# Patient Record
Sex: MALE | Race: WHITE | NOT HISPANIC OR LATINO | Employment: OTHER | URBAN - METROPOLITAN AREA
[De-identification: names, ages, dates, MRNs, and addresses within clinical notes are randomized per-mention and may not be internally consistent; named-entity substitution may affect disease eponyms.]

---

## 2018-02-02 ENCOUNTER — OFFICE VISIT CONVERTED (OUTPATIENT)
Dept: CARDIOLOGY | Facility: CLINIC | Age: 63
End: 2018-02-02
Attending: INTERNAL MEDICINE

## 2018-03-05 ENCOUNTER — OFFICE VISIT CONVERTED (OUTPATIENT)
Dept: UROLOGY | Facility: CLINIC | Age: 63
End: 2018-03-05
Attending: UROLOGY

## 2018-03-05 ENCOUNTER — CONVERSION ENCOUNTER (OUTPATIENT)
Dept: SURGERY | Facility: CLINIC | Age: 63
End: 2018-03-05

## 2018-04-03 ENCOUNTER — OFFICE VISIT CONVERTED (OUTPATIENT)
Dept: ORTHOPEDIC SURGERY | Facility: CLINIC | Age: 63
End: 2018-04-03
Attending: PHYSICIAN ASSISTANT

## 2018-04-23 ENCOUNTER — OFFICE VISIT CONVERTED (OUTPATIENT)
Dept: UROLOGY | Facility: CLINIC | Age: 63
End: 2018-04-23
Attending: UROLOGY

## 2018-04-23 ENCOUNTER — CONVERSION ENCOUNTER (OUTPATIENT)
Dept: SURGERY | Facility: CLINIC | Age: 63
End: 2018-04-23

## 2018-05-01 ENCOUNTER — OFFICE VISIT CONVERTED (OUTPATIENT)
Dept: ORTHOPEDIC SURGERY | Facility: CLINIC | Age: 63
End: 2018-05-01
Attending: PHYSICIAN ASSISTANT

## 2018-06-06 ENCOUNTER — CONVERSION ENCOUNTER (OUTPATIENT)
Dept: SURGERY | Facility: CLINIC | Age: 63
End: 2018-06-06

## 2018-06-06 ENCOUNTER — OFFICE VISIT CONVERTED (OUTPATIENT)
Dept: UROLOGY | Facility: CLINIC | Age: 63
End: 2018-06-06
Attending: UROLOGY

## 2018-06-12 ENCOUNTER — OFFICE VISIT CONVERTED (OUTPATIENT)
Dept: ORTHOPEDIC SURGERY | Facility: CLINIC | Age: 63
End: 2018-06-12
Attending: ORTHOPAEDIC SURGERY

## 2018-07-09 ENCOUNTER — OFFICE VISIT CONVERTED (OUTPATIENT)
Dept: UROLOGY | Facility: CLINIC | Age: 63
End: 2018-07-09
Attending: UROLOGY

## 2018-07-09 ENCOUNTER — CONVERSION ENCOUNTER (OUTPATIENT)
Dept: SURGERY | Facility: CLINIC | Age: 63
End: 2018-07-09

## 2018-09-06 ENCOUNTER — OFFICE VISIT CONVERTED (OUTPATIENT)
Dept: ORTHOPEDIC SURGERY | Facility: CLINIC | Age: 63
End: 2018-09-06
Attending: ORTHOPAEDIC SURGERY

## 2018-11-06 ENCOUNTER — OFFICE VISIT CONVERTED (OUTPATIENT)
Dept: SURGERY | Facility: CLINIC | Age: 63
End: 2018-11-06
Attending: PHYSICIAN ASSISTANT

## 2018-11-06 ENCOUNTER — CONVERSION ENCOUNTER (OUTPATIENT)
Dept: SURGERY | Facility: CLINIC | Age: 63
End: 2018-11-06

## 2018-11-07 ENCOUNTER — OFFICE VISIT CONVERTED (OUTPATIENT)
Dept: CARDIOLOGY | Facility: CLINIC | Age: 63
End: 2018-11-07
Attending: INTERNAL MEDICINE

## 2019-01-23 ENCOUNTER — HOSPITAL ENCOUNTER (OUTPATIENT)
Dept: FAMILY MEDICINE CLINIC | Facility: CLINIC | Age: 64
Discharge: HOME | End: 2019-01-23

## 2019-01-23 ENCOUNTER — OFFICE VISIT CONVERTED (OUTPATIENT)
Dept: FAMILY MEDICINE CLINIC | Facility: CLINIC | Age: 64
End: 2019-01-23
Attending: NURSE PRACTITIONER

## 2019-01-23 LAB
ALBUMIN SERPL-MCNC: 4.2 G/DL (ref 3.5–5)
ALBUMIN/GLOB SERPL: 1.4 {RATIO} (ref 1.4–2.6)
ALP SERPL-CCNC: 59 U/L (ref 56–155)
ALT SERPL-CCNC: 22 U/L (ref 10–40)
ANION GAP SERPL CALC-SCNC: 20 MMOL/L (ref 8–19)
APPEARANCE UR: CLEAR
AST SERPL-CCNC: 25 U/L (ref 15–50)
BILIRUB SERPL-MCNC: 0.93 MG/DL (ref 0.2–1.3)
BILIRUB UR QL: NEGATIVE
BUN SERPL-MCNC: 25 MG/DL (ref 5–25)
BUN/CREAT SERPL: 28 {RATIO} (ref 6–20)
CALCIUM SERPL-MCNC: 9.3 MG/DL (ref 8.7–10.4)
CHLORIDE SERPL-SCNC: 102 MMOL/L (ref 99–111)
COLOR UR: YELLOW
CONV CO2: 22 MMOL/L (ref 22–32)
CONV COLLECTION SOURCE (UA): NORMAL
CONV TOTAL PROTEIN: 7.1 G/DL (ref 6.3–8.2)
CONV UROBILINOGEN IN URINE BY AUTOMATED TEST STRIP: 0.2 {EHRLICHU}/DL (ref 0.1–1)
CREAT UR-MCNC: 0.9 MG/DL (ref 0.7–1.2)
GFR SERPLBLD BASED ON 1.73 SQ M-ARVRAT: >60 ML/MIN/{1.73_M2}
GLOBULIN UR ELPH-MCNC: 2.9 G/DL (ref 2–3.5)
GLUCOSE SERPL-MCNC: 90 MG/DL (ref 70–99)
GLUCOSE UR QL: NEGATIVE MG/DL
HGB UR QL STRIP: NEGATIVE
KETONES UR QL STRIP: NEGATIVE MG/DL
LEUKOCYTE ESTERASE UR QL STRIP: NEGATIVE
NITRITE UR QL STRIP: NEGATIVE
OSMOLALITY SERPL CALC.SUM OF ELEC: 294 MOSM/KG (ref 273–304)
PH UR STRIP.AUTO: 5.5 [PH] (ref 5–8)
POTASSIUM SERPL-SCNC: 4.3 MMOL/L (ref 3.5–5.3)
PROT UR QL: NEGATIVE MG/DL
PSA SERPL-MCNC: 5.76 NG/ML (ref 0–4)
SODIUM SERPL-SCNC: 140 MMOL/L (ref 135–147)
SP GR UR: 1.03 (ref 1–1.03)

## 2019-02-22 ENCOUNTER — OFFICE VISIT CONVERTED (OUTPATIENT)
Dept: FAMILY MEDICINE CLINIC | Facility: CLINIC | Age: 64
End: 2019-02-22
Attending: NURSE PRACTITIONER

## 2019-02-22 ENCOUNTER — HOSPITAL ENCOUNTER (OUTPATIENT)
Dept: FAMILY MEDICINE CLINIC | Facility: CLINIC | Age: 64
Discharge: HOME | End: 2019-02-22

## 2019-02-22 LAB
ANION GAP SERPL CALC-SCNC: 22 MMOL/L (ref 8–19)
BUN SERPL-MCNC: 24 MG/DL (ref 5–25)
BUN/CREAT SERPL: 27 {RATIO} (ref 6–20)
CALCIUM SERPL-MCNC: 9.8 MG/DL (ref 8.7–10.4)
CHLORIDE SERPL-SCNC: 103 MMOL/L (ref 99–111)
CONV CO2: 22 MMOL/L (ref 22–32)
CREAT UR-MCNC: 0.9 MG/DL (ref 0.7–1.2)
GFR SERPLBLD BASED ON 1.73 SQ M-ARVRAT: >60 ML/MIN/{1.73_M2}
GLUCOSE SERPL-MCNC: 107 MG/DL (ref 70–99)
OSMOLALITY SERPL CALC.SUM OF ELEC: 301 MOSM/KG (ref 273–304)
POTASSIUM SERPL-SCNC: 4.3 MMOL/L (ref 3.5–5.3)
PSA SERPL-MCNC: 9.1 NG/ML (ref 0–4)
SODIUM SERPL-SCNC: 143 MMOL/L (ref 135–147)

## 2019-02-28 ENCOUNTER — OFFICE VISIT CONVERTED (OUTPATIENT)
Dept: UROLOGY | Facility: CLINIC | Age: 64
End: 2019-02-28
Attending: UROLOGY

## 2019-03-19 ENCOUNTER — OFFICE VISIT CONVERTED (OUTPATIENT)
Dept: ORTHOPEDIC SURGERY | Facility: CLINIC | Age: 64
End: 2019-03-19
Attending: ORTHOPAEDIC SURGERY

## 2019-03-20 ENCOUNTER — HOSPITAL ENCOUNTER (OUTPATIENT)
Dept: FAMILY MEDICINE CLINIC | Facility: CLINIC | Age: 64
Discharge: HOME | End: 2019-03-20

## 2019-03-20 LAB
ANION GAP SERPL CALC-SCNC: 16 MMOL/L (ref 8–19)
BUN SERPL-MCNC: 24 MG/DL (ref 5–25)
BUN/CREAT SERPL: 22 {RATIO} (ref 6–20)
CALCIUM SERPL-MCNC: 9.2 MG/DL (ref 8.7–10.4)
CHLORIDE SERPL-SCNC: 102 MMOL/L (ref 99–111)
CHOLEST SERPL-MCNC: 136 MG/DL (ref 107–200)
CHOLEST/HDLC SERPL: 3.3 {RATIO} (ref 3–6)
CONV CO2: 23 MMOL/L (ref 22–32)
CREAT UR-MCNC: 1.1 MG/DL (ref 0.7–1.2)
GFR SERPLBLD BASED ON 1.73 SQ M-ARVRAT: >60 ML/MIN/{1.73_M2}
GLUCOSE SERPL-MCNC: 104 MG/DL (ref 70–99)
HDLC SERPL-MCNC: 41 MG/DL (ref 40–60)
LDLC SERPL CALC-MCNC: 79 MG/DL (ref 70–100)
OSMOLALITY SERPL CALC.SUM OF ELEC: 288 MOSM/KG (ref 273–304)
POTASSIUM SERPL-SCNC: 4.2 MMOL/L (ref 3.5–5.3)
SODIUM SERPL-SCNC: 137 MMOL/L (ref 135–147)
TRIGL SERPL-MCNC: 82 MG/DL (ref 40–150)
VLDLC SERPL-MCNC: 16 MG/DL (ref 5–37)

## 2019-04-01 ENCOUNTER — HOSPITAL ENCOUNTER (OUTPATIENT)
Dept: LAB | Facility: HOSPITAL | Age: 64
Discharge: HOME | End: 2019-04-01
Attending: UROLOGY

## 2019-04-01 LAB — PSA SERPL-MCNC: 6.68 NG/ML (ref 0–4)

## 2019-04-11 ENCOUNTER — OFFICE VISIT CONVERTED (OUTPATIENT)
Dept: UROLOGY | Facility: CLINIC | Age: 64
End: 2019-04-11
Attending: UROLOGY

## 2019-05-16 ENCOUNTER — OFFICE VISIT CONVERTED (OUTPATIENT)
Dept: CARDIOLOGY | Facility: CLINIC | Age: 64
End: 2019-05-16
Attending: INTERNAL MEDICINE

## 2019-05-24 ENCOUNTER — HOSPITAL ENCOUNTER (OUTPATIENT)
Dept: OTHER | Facility: HOSPITAL | Age: 64
Discharge: HOME | End: 2019-05-24
Attending: INTERNAL MEDICINE

## 2019-05-24 LAB
ANION GAP SERPL CALC-SCNC: 19 MMOL/L (ref 8–19)
BUN SERPL-MCNC: 19 MG/DL (ref 5–25)
BUN/CREAT SERPL: 21 {RATIO} (ref 6–20)
CALCIUM SERPL-MCNC: 9.3 MG/DL (ref 8.7–10.4)
CHLORIDE SERPL-SCNC: 103 MMOL/L (ref 99–111)
CONV CO2: 24 MMOL/L (ref 22–32)
CREAT UR-MCNC: 0.91 MG/DL (ref 0.7–1.2)
GFR SERPLBLD BASED ON 1.73 SQ M-ARVRAT: >60 ML/MIN/{1.73_M2}
GLUCOSE SERPL-MCNC: 104 MG/DL (ref 70–99)
OSMOLALITY SERPL CALC.SUM OF ELEC: 297 MOSM/KG (ref 273–304)
POTASSIUM SERPL-SCNC: 4.3 MMOL/L (ref 3.5–5.3)
SODIUM SERPL-SCNC: 142 MMOL/L (ref 135–147)

## 2019-05-31 ENCOUNTER — CONVERSION ENCOUNTER (OUTPATIENT)
Dept: CARDIOLOGY | Facility: CLINIC | Age: 64
End: 2019-05-31
Attending: INTERNAL MEDICINE

## 2019-09-19 ENCOUNTER — HOSPITAL ENCOUNTER (OUTPATIENT)
Dept: FAMILY MEDICINE CLINIC | Facility: CLINIC | Age: 64
Discharge: HOME | End: 2019-09-19
Attending: NURSE PRACTITIONER

## 2019-09-19 ENCOUNTER — OFFICE VISIT CONVERTED (OUTPATIENT)
Dept: FAMILY MEDICINE CLINIC | Facility: CLINIC | Age: 64
End: 2019-09-19
Attending: NURSE PRACTITIONER

## 2019-09-19 ENCOUNTER — CONVERSION ENCOUNTER (OUTPATIENT)
Dept: FAMILY MEDICINE CLINIC | Facility: CLINIC | Age: 64
End: 2019-09-19

## 2019-09-19 LAB
25(OH)D3 SERPL-MCNC: 50.1 NG/ML (ref 30–100)
ALBUMIN SERPL-MCNC: 4.6 G/DL (ref 3.5–5)
ALBUMIN/GLOB SERPL: 1.5 {RATIO} (ref 1.4–2.6)
ALP SERPL-CCNC: 74 U/L (ref 56–155)
ALT SERPL-CCNC: 21 U/L (ref 10–40)
ANION GAP SERPL CALC-SCNC: 18 MMOL/L (ref 8–19)
AST SERPL-CCNC: 24 U/L (ref 15–50)
BASOPHILS # BLD AUTO: 0.05 10*3/UL (ref 0–0.2)
BASOPHILS NFR BLD AUTO: 0.7 % (ref 0–3)
BILIRUB SERPL-MCNC: 0.84 MG/DL (ref 0.2–1.3)
BUN SERPL-MCNC: 23 MG/DL (ref 5–25)
BUN/CREAT SERPL: 24 {RATIO} (ref 6–20)
CALCIUM SERPL-MCNC: 9.7 MG/DL (ref 8.7–10.4)
CHLORIDE SERPL-SCNC: 97 MMOL/L (ref 99–111)
CHOLEST SERPL-MCNC: 162 MG/DL (ref 107–200)
CHOLEST/HDLC SERPL: 3.7 {RATIO} (ref 3–6)
CONV ABS IMM GRAN: 0.02 10*3/UL (ref 0–0.2)
CONV CO2: 25 MMOL/L (ref 22–32)
CONV IMMATURE GRAN: 0.3 % (ref 0–1.8)
CONV TOTAL PROTEIN: 7.7 G/DL (ref 6.3–8.2)
CREAT UR-MCNC: 0.94 MG/DL (ref 0.7–1.2)
DEPRECATED RDW RBC AUTO: 44.1 FL (ref 35.1–43.9)
EOSINOPHIL # BLD AUTO: 0.12 10*3/UL (ref 0–0.7)
EOSINOPHIL # BLD AUTO: 1.6 % (ref 0–7)
ERYTHROCYTE [DISTWIDTH] IN BLOOD BY AUTOMATED COUNT: 12.8 % (ref 11.6–14.4)
GFR SERPLBLD BASED ON 1.73 SQ M-ARVRAT: >60 ML/MIN/{1.73_M2}
GLOBULIN UR ELPH-MCNC: 3.1 G/DL (ref 2–3.5)
GLUCOSE SERPL-MCNC: 125 MG/DL (ref 70–99)
HCT VFR BLD AUTO: 43.2 % (ref 42–52)
HDLC SERPL-MCNC: 44 MG/DL (ref 40–60)
HGB BLD-MCNC: 15 G/DL (ref 14–18)
LDLC SERPL CALC-MCNC: 95 MG/DL (ref 70–100)
LYMPHOCYTES # BLD AUTO: 1.55 10*3/UL (ref 1–5)
LYMPHOCYTES NFR BLD AUTO: 20.4 % (ref 20–45)
MCH RBC QN AUTO: 32.6 PG (ref 27–31)
MCHC RBC AUTO-ENTMCNC: 34.7 G/DL (ref 33–37)
MCV RBC AUTO: 93.9 FL (ref 80–96)
MONOCYTES # BLD AUTO: 0.61 10*3/UL (ref 0.2–1.2)
MONOCYTES NFR BLD AUTO: 8 % (ref 3–10)
NEUTROPHILS # BLD AUTO: 5.23 10*3/UL (ref 2–8)
NEUTROPHILS NFR BLD AUTO: 69 % (ref 30–85)
NRBC CBCN: 0 % (ref 0–0.7)
OSMOLALITY SERPL CALC.SUM OF ELEC: 287 MOSM/KG (ref 273–304)
PLATELET # BLD AUTO: 256 10*3/UL (ref 130–400)
PMV BLD AUTO: 10.8 FL (ref 9.4–12.4)
POTASSIUM SERPL-SCNC: 4.1 MMOL/L (ref 3.5–5.3)
RBC # BLD AUTO: 4.6 10*6/UL (ref 4.7–6.1)
SODIUM SERPL-SCNC: 136 MMOL/L (ref 135–147)
TRIGL SERPL-MCNC: 117 MG/DL (ref 40–150)
TSH SERPL-ACNC: 5.54 M[IU]/L (ref 0.27–4.2)
VLDLC SERPL-MCNC: 23 MG/DL (ref 5–37)
WBC # BLD AUTO: 7.58 10*3/UL (ref 4.8–10.8)

## 2019-10-11 ENCOUNTER — HOSPITAL ENCOUNTER (OUTPATIENT)
Dept: FAMILY MEDICINE CLINIC | Facility: CLINIC | Age: 64
Discharge: HOME | End: 2019-10-11
Attending: NURSE PRACTITIONER

## 2019-10-11 ENCOUNTER — CONVERSION ENCOUNTER (OUTPATIENT)
Dept: FAMILY MEDICINE CLINIC | Facility: CLINIC | Age: 64
End: 2019-10-11

## 2019-10-11 ENCOUNTER — OFFICE VISIT CONVERTED (OUTPATIENT)
Dept: FAMILY MEDICINE CLINIC | Facility: CLINIC | Age: 64
End: 2019-10-11
Attending: NURSE PRACTITIONER

## 2019-10-11 LAB
EST. AVERAGE GLUCOSE BLD GHB EST-MCNC: 123 MG/DL
HBA1C MFR BLD: 5.9 % (ref 3.5–5.7)
T4 FREE SERPL-MCNC: 1.4 NG/DL (ref 0.9–1.8)
TSH SERPL-ACNC: 4.88 M[IU]/L (ref 0.27–4.2)

## 2019-10-31 ENCOUNTER — HOSPITAL ENCOUNTER (OUTPATIENT)
Dept: LAB | Facility: HOSPITAL | Age: 64
Discharge: HOME | End: 2019-10-31
Attending: UROLOGY

## 2019-10-31 LAB — PSA SERPL-MCNC: 5.59 NG/ML (ref 0–4)

## 2019-11-06 ENCOUNTER — CONVERSION ENCOUNTER (OUTPATIENT)
Dept: SURGERY | Facility: CLINIC | Age: 64
End: 2019-11-06

## 2019-11-06 ENCOUNTER — OFFICE VISIT CONVERTED (OUTPATIENT)
Dept: CARDIOLOGY | Facility: CLINIC | Age: 64
End: 2019-11-06
Attending: INTERNAL MEDICINE

## 2019-11-06 ENCOUNTER — OFFICE VISIT CONVERTED (OUTPATIENT)
Dept: UROLOGY | Facility: CLINIC | Age: 64
End: 2019-11-06
Attending: UROLOGY

## 2019-11-18 ENCOUNTER — HOSPITAL ENCOUNTER (OUTPATIENT)
Dept: GENERAL RADIOLOGY | Facility: HOSPITAL | Age: 64
Discharge: HOME | End: 2019-11-18
Attending: PSYCHIATRY & NEUROLOGY

## 2019-12-31 ENCOUNTER — HOSPITAL ENCOUNTER (OUTPATIENT)
Dept: GENERAL RADIOLOGY | Facility: HOSPITAL | Age: 64
Discharge: HOME | End: 2019-12-31

## 2020-01-07 ENCOUNTER — OFFICE VISIT CONVERTED (OUTPATIENT)
Dept: FAMILY MEDICINE CLINIC | Facility: CLINIC | Age: 65
End: 2020-01-07
Attending: NURSE PRACTITIONER

## 2020-01-10 ENCOUNTER — HOSPITAL ENCOUNTER (OUTPATIENT)
Dept: GENERAL RADIOLOGY | Facility: HOSPITAL | Age: 65
Discharge: HOME | End: 2020-01-10
Attending: NURSE PRACTITIONER

## 2020-01-10 LAB
CREAT BLD-MCNC: 1.1 MG/DL (ref 0.6–1.4)
GFR SERPLBLD BASED ON 1.73 SQ M-ARVRAT: >60 ML/MIN/{1.73_M2}

## 2020-01-29 ENCOUNTER — HOSPITAL ENCOUNTER (OUTPATIENT)
Dept: GENERAL RADIOLOGY | Facility: HOSPITAL | Age: 65
Discharge: HOME | End: 2020-01-29

## 2020-01-31 ENCOUNTER — HOSPITAL ENCOUNTER (OUTPATIENT)
Dept: FAMILY MEDICINE CLINIC | Facility: CLINIC | Age: 65
Discharge: HOME | End: 2020-01-31
Attending: NURSE PRACTITIONER

## 2020-01-31 LAB
ALBUMIN SERPL-MCNC: 4.2 G/DL (ref 3.5–5)
ALBUMIN/GLOB SERPL: 1.4 {RATIO} (ref 1.4–2.6)
ALP SERPL-CCNC: 59 U/L (ref 56–155)
ALT SERPL-CCNC: 23 U/L (ref 10–40)
ANION GAP SERPL CALC-SCNC: 16 MMOL/L (ref 8–19)
AST SERPL-CCNC: 28 U/L (ref 15–50)
BASOPHILS # BLD AUTO: 0.05 10*3/UL (ref 0–0.2)
BASOPHILS NFR BLD AUTO: 0.6 % (ref 0–3)
BILIRUB SERPL-MCNC: 0.97 MG/DL (ref 0.2–1.3)
BUN SERPL-MCNC: 26 MG/DL (ref 5–25)
BUN/CREAT SERPL: 25 {RATIO} (ref 6–20)
CALCIUM SERPL-MCNC: 9.5 MG/DL (ref 8.7–10.4)
CHLORIDE SERPL-SCNC: 99 MMOL/L (ref 99–111)
CONV ABS IMM GRAN: 0.02 10*3/UL (ref 0–0.2)
CONV CO2: 26 MMOL/L (ref 22–32)
CONV IMMATURE GRAN: 0.3 % (ref 0–1.8)
CONV TOTAL PROTEIN: 7.2 G/DL (ref 6.3–8.2)
CREAT UR-MCNC: 1.03 MG/DL (ref 0.7–1.2)
DEPRECATED RDW RBC AUTO: 45.1 FL (ref 35.1–43.9)
EOSINOPHIL # BLD AUTO: 0.06 10*3/UL (ref 0–0.7)
EOSINOPHIL # BLD AUTO: 0.8 % (ref 0–7)
ERYTHROCYTE [DISTWIDTH] IN BLOOD BY AUTOMATED COUNT: 13 % (ref 11.6–14.4)
GFR SERPLBLD BASED ON 1.73 SQ M-ARVRAT: >60 ML/MIN/{1.73_M2}
GLOBULIN UR ELPH-MCNC: 3 G/DL (ref 2–3.5)
GLUCOSE SERPL-MCNC: 97 MG/DL (ref 70–99)
HCT VFR BLD AUTO: 44.5 % (ref 42–52)
HGB BLD-MCNC: 14.7 G/DL (ref 14–18)
LYMPHOCYTES # BLD AUTO: 1.61 10*3/UL (ref 1–5)
LYMPHOCYTES NFR BLD AUTO: 20.3 % (ref 20–45)
MCH RBC QN AUTO: 31.3 PG (ref 27–31)
MCHC RBC AUTO-ENTMCNC: 33 G/DL (ref 33–37)
MCV RBC AUTO: 94.9 FL (ref 80–96)
MONOCYTES # BLD AUTO: 0.6 10*3/UL (ref 0.2–1.2)
MONOCYTES NFR BLD AUTO: 7.6 % (ref 3–10)
NEUTROPHILS # BLD AUTO: 5.59 10*3/UL (ref 2–8)
NEUTROPHILS NFR BLD AUTO: 70.4 % (ref 30–85)
NRBC CBCN: 0 % (ref 0–0.7)
OSMOLALITY SERPL CALC.SUM OF ELEC: 289 MOSM/KG (ref 273–304)
PLATELET # BLD AUTO: 226 10*3/UL (ref 130–400)
PMV BLD AUTO: 10.8 FL (ref 9.4–12.4)
POTASSIUM SERPL-SCNC: 4 MMOL/L (ref 3.5–5.3)
RBC # BLD AUTO: 4.69 10*6/UL (ref 4.7–6.1)
SODIUM SERPL-SCNC: 137 MMOL/L (ref 135–147)
T4 FREE SERPL-MCNC: 1.3 NG/DL (ref 0.9–1.8)
TESTOST SERPL-MCNC: 188 NG/DL (ref 193–740)
TSH SERPL-ACNC: 4.15 M[IU]/L (ref 0.27–4.2)
WBC # BLD AUTO: 7.93 10*3/UL (ref 4.8–10.8)

## 2020-02-01 LAB — 25(OH)D3 SERPL-MCNC: 50.1 NG/ML (ref 30–100)

## 2020-02-07 ENCOUNTER — OFFICE VISIT CONVERTED (OUTPATIENT)
Dept: FAMILY MEDICINE CLINIC | Facility: CLINIC | Age: 65
End: 2020-02-07
Attending: NURSE PRACTITIONER

## 2020-02-07 ENCOUNTER — CONVERSION ENCOUNTER (OUTPATIENT)
Dept: FAMILY MEDICINE CLINIC | Facility: CLINIC | Age: 65
End: 2020-02-07

## 2020-02-08 ENCOUNTER — HOSPITAL ENCOUNTER (OUTPATIENT)
Dept: OTHER | Facility: HOSPITAL | Age: 65
Discharge: HOME | End: 2020-02-08
Attending: NURSE PRACTITIONER

## 2020-02-08 LAB
EST. AVERAGE GLUCOSE BLD GHB EST-MCNC: 131 MG/DL
HBA1C MFR BLD: 6.2 % (ref 3.5–5.7)

## 2020-02-15 LAB
BIOAVAILABLE TESTOSTERONE, %: 17.6 %
CONV TESTOSTERONE, FREE: 39 PG/ML
SHBG SERPL-SCNC: 64.5 NMOL/L
TESTOST FREE MFR SERPL: 1 %
TESTOST SERPL-MCNC: 392 NG/DL
TESTOSTERONE.FREE+WB SERPL-MCNC: 69 NG/DL

## 2020-03-06 ENCOUNTER — OFFICE VISIT CONVERTED (OUTPATIENT)
Dept: FAMILY MEDICINE CLINIC | Facility: CLINIC | Age: 65
End: 2020-03-06
Attending: NURSE PRACTITIONER

## 2020-04-16 ENCOUNTER — HOSPITAL ENCOUNTER (OUTPATIENT)
Dept: LAB | Facility: HOSPITAL | Age: 65
Discharge: HOME | End: 2020-04-16
Attending: UROLOGY

## 2020-04-16 LAB
APPEARANCE UR: CLEAR
BILIRUB UR QL: NEGATIVE
COLOR UR: YELLOW
CONV COLLECTION SOURCE (UA): NORMAL
CONV UROBILINOGEN IN URINE BY AUTOMATED TEST STRIP: 0.2 {EHRLICHU}/DL (ref 0.1–1)
GLUCOSE UR QL: NEGATIVE MG/DL
HGB UR QL STRIP: NEGATIVE
KETONES UR QL STRIP: NEGATIVE MG/DL
LEUKOCYTE ESTERASE UR QL STRIP: NEGATIVE
NITRITE UR QL STRIP: NEGATIVE
PH UR STRIP.AUTO: 7 [PH] (ref 5–8)
PROT UR QL: NEGATIVE MG/DL
SP GR UR: 1.01 (ref 1–1.03)

## 2020-04-18 LAB — BACTERIA UR CULT: NORMAL

## 2020-04-22 ENCOUNTER — TELEPHONE CONVERTED (OUTPATIENT)
Dept: UROLOGY | Facility: CLINIC | Age: 65
End: 2020-04-22
Attending: UROLOGY

## 2020-05-07 ENCOUNTER — OFFICE VISIT CONVERTED (OUTPATIENT)
Dept: FAMILY MEDICINE CLINIC | Facility: CLINIC | Age: 65
End: 2020-05-07
Attending: NURSE PRACTITIONER

## 2020-05-21 ENCOUNTER — TELEMEDICINE CONVERTED (OUTPATIENT)
Dept: CARDIOLOGY | Facility: CLINIC | Age: 65
End: 2020-05-21
Attending: INTERNAL MEDICINE

## 2020-05-26 ENCOUNTER — OFFICE VISIT CONVERTED (OUTPATIENT)
Dept: FAMILY MEDICINE CLINIC | Facility: CLINIC | Age: 65
End: 2020-05-26
Attending: NURSE PRACTITIONER

## 2020-06-02 ENCOUNTER — OFFICE VISIT CONVERTED (OUTPATIENT)
Dept: FAMILY MEDICINE CLINIC | Facility: CLINIC | Age: 65
End: 2020-06-02
Attending: NURSE PRACTITIONER

## 2020-06-18 ENCOUNTER — HOSPITAL ENCOUNTER (OUTPATIENT)
Dept: CARDIOLOGY | Facility: HOSPITAL | Age: 65
Discharge: HOME | End: 2020-06-18
Attending: NURSE PRACTITIONER

## 2020-06-19 ENCOUNTER — HOSPITAL ENCOUNTER (OUTPATIENT)
Dept: GENERAL RADIOLOGY | Facility: HOSPITAL | Age: 65
Discharge: HOME | End: 2020-06-19
Attending: NURSE PRACTITIONER

## 2020-06-23 ENCOUNTER — OFFICE VISIT CONVERTED (OUTPATIENT)
Dept: ORTHOPEDIC SURGERY | Facility: CLINIC | Age: 65
End: 2020-06-23
Attending: ORTHOPAEDIC SURGERY

## 2020-06-23 ENCOUNTER — CONVERSION ENCOUNTER (OUTPATIENT)
Dept: ORTHOPEDIC SURGERY | Facility: CLINIC | Age: 65
End: 2020-06-23

## 2020-07-07 ENCOUNTER — HOSPITAL ENCOUNTER (OUTPATIENT)
Dept: FAMILY MEDICINE CLINIC | Facility: CLINIC | Age: 65
Discharge: HOME | End: 2020-07-07
Attending: NURSE PRACTITIONER

## 2020-07-08 LAB — PSA SERPL-MCNC: 4.72 NG/ML (ref 0–4)

## 2020-08-04 ENCOUNTER — OFFICE VISIT CONVERTED (OUTPATIENT)
Dept: FAMILY MEDICINE CLINIC | Facility: CLINIC | Age: 65
End: 2020-08-04
Attending: NURSE PRACTITIONER

## 2020-08-18 ENCOUNTER — OFFICE VISIT CONVERTED (OUTPATIENT)
Dept: ORTHOPEDIC SURGERY | Facility: CLINIC | Age: 65
End: 2020-08-18
Attending: ORTHOPAEDIC SURGERY

## 2020-09-01 ENCOUNTER — OFFICE VISIT CONVERTED (OUTPATIENT)
Dept: FAMILY MEDICINE CLINIC | Facility: CLINIC | Age: 65
End: 2020-09-01
Attending: NURSE PRACTITIONER

## 2020-09-01 ENCOUNTER — CONVERSION ENCOUNTER (OUTPATIENT)
Dept: FAMILY MEDICINE CLINIC | Facility: CLINIC | Age: 65
End: 2020-09-01

## 2020-09-29 ENCOUNTER — HOSPITAL ENCOUNTER (OUTPATIENT)
Dept: ORTHOPEDIC SURGERY | Facility: CLINIC | Age: 65
Setting detail: RECURRING SERIES
Discharge: HOME | End: 2020-12-30
Attending: NURSE PRACTITIONER

## 2020-11-20 ENCOUNTER — OFFICE VISIT CONVERTED (OUTPATIENT)
Dept: UROLOGY | Facility: CLINIC | Age: 65
End: 2020-11-20
Attending: UROLOGY

## 2020-12-17 ENCOUNTER — HOSPITAL ENCOUNTER (OUTPATIENT)
Dept: FAMILY MEDICINE CLINIC | Facility: CLINIC | Age: 65
Discharge: HOME | End: 2020-12-17
Attending: NURSE PRACTITIONER

## 2020-12-17 LAB
EST. AVERAGE GLUCOSE BLD GHB EST-MCNC: 123 MG/DL
HBA1C MFR BLD: 5.9 % (ref 3.5–5.7)
PSA SERPL-MCNC: 8.22 NG/ML (ref 0–4)
TESTOST SERPL-MCNC: 477 NG/DL (ref 193–740)

## 2020-12-20 LAB — CONV ESTROGENS, TOTAL, SERUM: 167 PG/ML (ref 40–115)

## 2020-12-23 ENCOUNTER — OFFICE VISIT CONVERTED (OUTPATIENT)
Dept: FAMILY MEDICINE CLINIC | Facility: CLINIC | Age: 65
End: 2020-12-23
Attending: NURSE PRACTITIONER

## 2020-12-23 ENCOUNTER — HOSPITAL ENCOUNTER (OUTPATIENT)
Dept: FAMILY MEDICINE CLINIC | Facility: CLINIC | Age: 65
Discharge: HOME | End: 2020-12-23
Attending: NURSE PRACTITIONER

## 2020-12-23 LAB — PSA SERPL-MCNC: 6.34 NG/ML (ref 0–4)

## 2020-12-25 LAB — PROGEST SERPL-MCNC: <0.1 NG/ML (ref 0–0.5)

## 2021-01-06 ENCOUNTER — OFFICE VISIT CONVERTED (OUTPATIENT)
Dept: CARDIOLOGY | Facility: CLINIC | Age: 66
End: 2021-01-06
Attending: INTERNAL MEDICINE

## 2021-02-04 ENCOUNTER — OFFICE VISIT CONVERTED (OUTPATIENT)
Dept: ORTHOPEDIC SURGERY | Facility: CLINIC | Age: 66
End: 2021-02-04
Attending: ORTHOPAEDIC SURGERY

## 2021-02-22 ENCOUNTER — HOSPITAL ENCOUNTER (OUTPATIENT)
Dept: GENERAL RADIOLOGY | Facility: HOSPITAL | Age: 66
Discharge: HOME | End: 2021-02-22
Attending: THORACIC SURGERY (CARDIOTHORACIC VASCULAR SURGERY)

## 2021-03-05 ENCOUNTER — HOSPITAL ENCOUNTER (OUTPATIENT)
Dept: FAMILY MEDICINE CLINIC | Facility: CLINIC | Age: 66
Discharge: HOME | End: 2021-03-05
Attending: NURSE PRACTITIONER

## 2021-03-06 LAB — TESTOST SERPL-MCNC: 95 NG/DL (ref 193–740)

## 2021-03-09 ENCOUNTER — HOSPITAL ENCOUNTER (OUTPATIENT)
Dept: FAMILY MEDICINE CLINIC | Facility: CLINIC | Age: 66
Discharge: HOME | End: 2021-03-09
Attending: NURSE PRACTITIONER

## 2021-03-12 LAB — TESTOSTERONE, FREE: 1.7 PG/ML (ref 6.6–18.1)

## 2021-03-15 LAB — TESTOSTERONE, FREE: 10.4 PG/ML (ref 6.6–18.1)

## 2021-03-24 ENCOUNTER — OFFICE VISIT CONVERTED (OUTPATIENT)
Dept: FAMILY MEDICINE CLINIC | Facility: CLINIC | Age: 66
End: 2021-03-24
Attending: NURSE PRACTITIONER

## 2021-04-07 ENCOUNTER — HOSPITAL ENCOUNTER (OUTPATIENT)
Dept: LAB | Facility: HOSPITAL | Age: 66
Discharge: HOME | End: 2021-04-07
Attending: NURSE PRACTITIONER

## 2021-04-07 LAB
FSH SERPL-ACNC: 0.2 M[IU]/ML
LH SERPL-ACNC: 0.1 M[IU]/ML

## 2021-04-16 ENCOUNTER — OFFICE VISIT CONVERTED (OUTPATIENT)
Dept: FAMILY MEDICINE CLINIC | Facility: CLINIC | Age: 66
End: 2021-04-16
Attending: NURSE PRACTITIONER

## 2021-04-16 ENCOUNTER — HOSPITAL ENCOUNTER (OUTPATIENT)
Dept: OTHER | Facility: HOSPITAL | Age: 66
Discharge: HOME | End: 2021-04-16
Attending: INTERNAL MEDICINE

## 2021-04-16 LAB
BASOPHILS # BLD AUTO: 0.05 10*3/UL (ref 0–0.2)
BASOPHILS NFR BLD AUTO: 0.7 % (ref 0–3)
CONV ABS IMM GRAN: 0.03 10*3/UL (ref 0–0.2)
CONV IMMATURE GRAN: 0.4 % (ref 0–1.8)
DEPRECATED RDW RBC AUTO: 57.7 FL (ref 35.1–43.9)
EOSINOPHIL # BLD AUTO: 0.09 10*3/UL (ref 0–0.7)
EOSINOPHIL # BLD AUTO: 1.3 % (ref 0–7)
ERYTHROCYTE [DISTWIDTH] IN BLOOD BY AUTOMATED COUNT: 17 % (ref 11.6–14.4)
FSH SERPL-ACNC: 0.2 M[IU]/ML
HCT VFR BLD AUTO: 46 % (ref 42–52)
HGB BLD-MCNC: 14.3 G/DL (ref 14–18)
LH SERPL-ACNC: 0.1 M[IU]/ML
LYMPHOCYTES # BLD AUTO: 1.56 10*3/UL (ref 1–5)
LYMPHOCYTES NFR BLD AUTO: 23.2 % (ref 20–45)
MCH RBC QN AUTO: 29 PG (ref 27–31)
MCHC RBC AUTO-ENTMCNC: 31.1 G/DL (ref 33–37)
MCV RBC AUTO: 93.3 FL (ref 80–96)
MONOCYTES # BLD AUTO: 0.69 10*3/UL (ref 0.2–1.2)
MONOCYTES NFR BLD AUTO: 10.3 % (ref 3–10)
NEUTROPHILS # BLD AUTO: 4.3 10*3/UL (ref 2–8)
NEUTROPHILS NFR BLD AUTO: 64.1 % (ref 30–85)
NRBC CBCN: 0 % (ref 0–0.7)
PLATELET # BLD AUTO: 260 10*3/UL (ref 130–400)
PMV BLD AUTO: 10.5 FL (ref 9.4–12.4)
RBC # BLD AUTO: 4.93 10*6/UL (ref 4.7–6.1)
TESTOST SERPL-MCNC: 300 NG/DL (ref 193–740)
WBC # BLD AUTO: 6.72 10*3/UL (ref 4.8–10.8)

## 2021-04-19 LAB — TESTOSTERONE, FREE: 3.6 PG/ML (ref 6.6–18.1)

## 2021-04-23 LAB — CONV ESTROGENS, TOTAL, SERUM: 58 PG/ML (ref 40–115)

## 2021-05-10 NOTE — H&P
History and Physical      Patient Name: Poncho Hernandez   Patient ID: [id number]   Sex: Male   YOB: 1955    Primary Care Provider: Noemi CAMILO   Referring Provider: Ash CAMILO    Visit Date: June 23, 2020    Provider: Adi Renteria MD   Location: [facility]   Location Address: [address] [place] [address]  [id number]   Location Phone: [phone]          Chief Complaint  · Right Knee Pain  · History of Right Total Knee Arthroplasty      History Of Present Illness  Poncho Hernandez is a 64 year old /White male who presents today to [facility].      Patient presents today for right knee status-post arthroplasty performed 3/19/2018 by Dr. Renteria. He was doing well with his knee but has some weakness still. He reports the knee has been aching him more than usual and has some issues with ascending stairs. He has been in physical therapy for the knee with PTA with some improvements. Patient reports the pain has been lingering for about two months now, it has improved but is still bothersome.                 Past Medical History  Aftercare following bilateral knee joint replacement surgery; Anemia, Unspecified; Arthritis; Bladder Disorder; BPH; Depression with anxiety; Elevated prostate specific antigen (PSA); Forgetfulness; Heart Disease; Heart Murmur; Hernia; High blood pressure; Hypertension; Limb Swelling; Low testosterone in male; Night sweats; Prostate Disorder         Past Surgical History  Adenoidectomy; Arm Surgery; Artificial Joints/Limbs; Bilateral total knee arthroplasty; Colonoscopy; Cystoscopy with transurethral resection of prostate (TURP) using button electrode; Hand surgery; Hernia; Joint Surgery; Metal implants; Prostate Biopsy         Medication List  biotin oral; Calcium 500 + D 500 mg(1,250mg) -200 unit oral tablet; Co Q-10 200 mg oral capsule; Depo-Testosterone 200 mg/mL intramuscular oil; Fish Oil 1,000 mg (120 mg-180 mg) oral  "capsule; garlic 0 oral; lisinopril-hydrochlorothiazide 20-12.5 mg oral tablet; magnesium oxide 400 mg magnesium oral capsule; oxybutynin chloride 5 mg oral tablet; Toprol XL 50 mg oral tablet extended release 24 hr; tumeric oral; vitamin E 400 unit oral capsule         Allergy List  hydroquinone; Marcaine; Septocaine         Family Medical History  Stroke; Heart Disease; Cancer, Unspecified; Hypertension; Colon Cancer; Heart Attack (MI); Prostate cancer; Osteoporosis; Family history of Arthritis         Social History  Alcohol (Never); Alcohol Use (Never); Exercises regularly; lives with spouse; .; Physician; 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); Retired.; Tobacco (Never)         Review of Systems  · Constitutional  o Denies  o : fever, chills, weight loss  · Cardiovascular  o Denies  o : chest pain, shortness of breath  · Gastrointestinal  o Denies  o : liver disease, heartburn, nausea, blood in stools  · Genitourinary  o Denies  o : painful urination, blood in urine  · Integument  o Denies  o : rash, itching  · Neurologic  o Denies  o : headache, weakness, loss of consciousness  · Musculoskeletal  o Denies  o : painful, swollen joints  · Psychiatric  o Denies  o : drug/alcohol addiction, anxiety, depression      Vitals  Date Time BP Position Site L\R Cuff Size HR RR TEMP (F) WT  HT  BMI kg/m2 BSA m2 O2 Sat        06/23/2020 09:07 AM      57 - R   241lbs 0oz 6'  2\" 30.94 2.39 97 %          Physical Examination  · Constitutional  o Appearance  o : well developed, well-nourished, no obvious deformities present  · Head and Face  o Head  o :   § Inspection  § : normocephalic  o Face  o :   § Inspection  § : no facial lesions  · Eyes  o Conjunctivae  o : conjunctivae normal  o Sclerae  o : sclerae white  · Ears, Nose, Mouth and Throat  o Ears  o :   § External Ears  § : appearance within normal limits  § Hearing  § : intact  o Nose  o :   § External Nose  § : appearance normal  · Neck  o Inspection/Palpation  o : " normal appearance  o Range of Motion  o : full range of motion  · Respiratory  o Respiratory Effort  o : breathing unlabored  o Inspection of Chest  o : normal appearance  o Auscultation of Lungs  o : no audible wheezing or rales  · Cardiovascular  o Heart  o : regular rate  · Gastrointestinal  o Abdominal Examination  o : soft and non-tender  · Skin and Subcutaneous Tissue  o General Inspection  o : intact, no rashes  · Psychiatric  o General  o : Alert and oriented x3  o Judgement and Insight  o : judgment and insight intact  o Mood and Affect  o : mood normal, affect appropriate  · Right Knee  o Inspection  o : full extension, tender over the lateral knee, tender over anterior knee, sensation intact, full flexion, well-healed scar over the anterior knee  · Injection Note/Aspiration Note  o Site  o : IM  o Procedure  o : Procedure: After educating the patient, patient gave consent for the procedure. After using alcohol prep, injection was given. The patient tolerated the procedure well.   o Medication  o : 2ml's of 4 mg Dexamethasone   · In Office Procedures  o View  o : AP/LATERAL/SUNRISE   o Site  o : right, knee  o Indication  o : Right Total Knee Arthroplasty  o Study  o : X-rays ordered, taken in the office, and reviewed today.  o Xray  o : reveals an intact appearing right knee replacement, no complications   o Comparative Data  o : No comparative data found          Assessment  · Right knee pain, unspecified chronicity     719.46/M25.561  · History of total knee arthroplasty, right     V43.65/Z96.651      Plan  · Orders  o 2.00 - Dexamethasone Injection 8mg (-3) - - 06/23/2020   L[facility] [id number] Exp 01 2021 [facility]ered by MER Light MA  o IM/SQ - Injection Fee [facility] (33355) - - 06/23/2020  o Knee (Right) [facility] Preferred View (30641-WH) - V43.65/Z96.651, 719.46/M25.561 - 06/23/2020  · Medications  o Medications have been Reconciled  o Transition of Care or Provider  Policy  · Instructions  o Reviewed the patient's Past Medical, Social, and Family history as well as the ROS at today's visit, no changes.  o Call or return if worsening symptoms.  o X-ray ordered, taken and reviewed at this visit.  o The above service was scribed by Beverly Campa on my behalf and I attest to the accuracy of the note. mc  o Discussed with the patient he may benefit from continued therapy and limit his high-impact activities at this time. We discussed with the patient an intra-articular knee injection is not recommended post-replacement, recommended an IM injection. Patient expressed understanding and wished to proceed with physical therapy order and IM injection.             Electronically Signed by: Beverly Campa-, Other -Author on June 23, 2020 03:11:11 PM  Electronically Co-signed by: Adi Renteria MD -Reviewer on June 25, 2020 12:19:50 PM

## 2021-05-12 NOTE — PROGRESS NOTES
Quick Note      Patient Name: Poncho Hernandez   Patient ID: [id number]   Sex: Male   YOB: 1955    Primary Care Provider: Noemi CAMILO   Referring Provider: Ash CAMILO    Visit Date: April 22, 2020    Provider: Monica Phillips MD   [facility]   Location Address: [address] [place] [address]  [id number]   Location Phone: [phone]          History Of Present Illness  TELEHEALTH TELEPHONE VISIT  Chief Complaint: Elevated PSA, BPH with obstruction   Poncho Hernandez is a 64 year old /White male who is presenting for evaluation via telehealth telephone visit. Verbal consent obtained before beginning visit.   Provider spent 11 minutes with the patient during telehealth visit.   The following staff were present during this visit: Rose Marie and Monica Phillips MD   Past Medical History/Overview of Patient Symptoms     PSA recently in October 2019 was 5.59.  This is actually down from his most recent PSA prior which was 6.68.  His PSA has been as high as 9.  He had a negative prostate biopsy in 2011.      Since his button TURP in May 2018 and he is doing very well.  He is emptying much better.  Less frequency and urgency.  Nocturia a few times a night.  He states his quality of life is greatly improved.  It has gotten a little worse over the past few months and he is having more nocturia with urgency.  He states his stream is still really good despite the urgency and nocturia.  He has it some times in the day but he is better able to control it.     He was recently diagnosed with low T and has started on depo-testosterone about one month ago.      Past uro history   *************  Takes Urinary System Support  - a herbal supplement    He has had an up and down elevated PSA for years with a negative prostate biopsy in 2011.  At that time his PSA was 5.7.  It had been as high as 6.98 in 2014.  It was 3.84 in Dec 2018 and then 9.10 in Feb 2019 in a  repeat check.  His most recent PSA is 6.68 in April 2019 after Bactrim for 4 weeks.  He also had another round of Cipro for a UTI.      He has been having more burning lately.     He had a button TURP that was unremarkable.                Assessment  · Benign enlargement of prostate     600.00/N40.0  · Elevated PSA     790.93/R97.20  · Overactive bladder     596.51/N32.81      Plan  · Orders  o Physician Telephone Evaluation, 11-20 minutes (05377) - 790.93/R97.20, 600.00/N40.0, 596.51/N32.81 - 04/22/2020  · Medications  o oxybutynin chloride 5 mg oral tablet   SIG: take 1 tablet by oral route QHS   DISP: (90) tablets with 3 refills  Prescribed on 04/22/2020     o Medications have been Reconciled  o Transition of Care or Provider Policy  · Instructions  o I started him on oxybutynin 5mg at night to help with his overnight OAB symptoms. I will see him in 6 months.   o I will see him back in 6 months with PSA prior.  o Plan Of Care:   o Chronic conditions reviewed and taken into consideration for today's treatment plan.  o Patient instructed to seek medical attention urgently for new or worsening symptoms.  o Patient was educated/instructed on their diagnosis, treatment and medications prior to discharge from the clinic today.  o Discussed Covid-19 precautions including, but not limited to, social distancing, avoid touching your face, and hand washing.             Electronically Signed by: Monica Phillips MD -Author on April 22, 2020 02:18:14 PM

## 2021-05-13 NOTE — PROGRESS NOTES
Progress Note      Patient Name: Poncho Hernandez   Patient ID: [id number]   Sex: Male   YOB: 1955    Primary Care Provider: Noemi CAMILO   Referring Provider: Ash CAMILO    Visit Date: May 26, 2020    Provider: TON العلي   Location: [facility]   Location Address: [address] [place] [address], [address]  [id number]   Location Phone: [phone]          Chief Complaint  · Welcome to Medicare Visit      History Of Present Illness  The patient is a 64 year old /White male who has come to this office for his Annual Wellness Visit.   His Primary Care Provider is Ash CAMILO. His comprehensive Care Team list, including suppliers, has been updated on the Facesheet. His medical/family history, height, weight, BMI, and blood pressure have been reviewed and are in the chart. The Health Risk Assessment has been completed and scanned in the chart.   Medications are listed in the medication list.   The active problem list includes: Arthritis, BPH, Depression with anxiety, Elevated prostate specific antigen (PSA), Forgetfulness, Heart Disease, Heart Murmur, Hypertension, Low testosterone in male, and Prostate Disorder   The patient does not have a history of substance use.   Patient reports his diet is adequate.   The Mini-Cog has been administered and is scanned in chart. The results are negative. His cognitive function is without limitation.   A hearing loss screen was completed today and the result is negative.   Patient does not have any risk factors for depression. Patient completed the PHQ-9 today and it has been scanned in the chart. The total score is 1-4.   The Timed Up and Go screen was administered today and the result is negative.   The Tobar Index of [place] in ADLs indicated full function (score of 6).   A Falls Risk Assessment has been completed, including a review of home fall hazards and medication review.   Overall, the patient's functional  ability is noted by this provider to be within normal limits. His level of safety is noted to be within normal limits. His balance/gait is within normal limits. There has been a fall without injury in the past year. Patient-specific home safety recommendations have been reviewed and a copy has been given to patient.   He denies issues with leaking urine.   There are no additional risk factors identified.   Living Will/Advanced Directive has not previously been completed.   Personalized health advice was given to the patient and a written health screening schedule was established; see Plan for details.   The patient is a 64 year old /White male who has come to this office for his Welcome to Medicare Visit. His Primary Care Provider is Ash CAMILO. His comprehensive Care Team list, including suppliers, has been updated on the Facesheet. His medical/surgical/family history, height, weight, BMI, and blood pressure have been reviewed and are in the chart.   Medications are listed in the medication list.   The active problem list includes: PROBLEM LIST   The patient does not have a history of substance use.   Patient reports his diet is adequate.   Patient reports his physical activity is adequate.   A hearing loss screen was completed today and the result is negative.   Patient does not have any risk factors for depression. Patient completed the PHQ-9 today and it has been scanned in the chart. The total score is 1-4.   The Timed-Up-and-Go screen was administered today and the result is negative.   The Tobar Index of [place] in ADLs indicated full function (score of 6).   A Falls Risk Assessment has been completed, including a review of home fall hazards and medication review.   His level of safety is noted to be within normal limits. His balance/gait is within normal limits. There WHAT IS FALL ASSESSMENT in the past year. Patient-specific home safety recommendations have been reviewed and a copy has  been given to patient.   He denies issues with leaking urine.   There are no additional risk factors identified.   Living Will/Advanced Directive STATUS OF ADVANCED DIRECTIVE.   Personalized health advice was given to the patient and a written health screening schedule was established; see Plan for details.   Poncho Hernandez is a 64 year old /White male who presents for evaluation and treatment of:       Past Medical History  Aftercare following bilateral knee joint replacement surgery; Anemia, Unspecified; Arthritis; Bladder Disorder; BPH; Depression with anxiety; Elevated prostate specific antigen (PSA); Forgetfulness; Heart Disease; Heart Murmur; Hernia; High blood pressure; Hypertension; Limb Swelling; Low testosterone in male; Night sweats; Prostate Disorder         Past Surgical History  Adenoidectomy; Arm Surgery; Artificial Joints/Limbs; Bilateral total knee arthroplasty; Colonoscopy; Cystoscopy with transurethral resection of prostate (TURP) using button electrode; Hand surgery; Hernia; Joint Surgery; Metal implants; Prostate Biopsy         Medication List  biotin oral; Calcium 500 + D 500 mg(1,250mg) -200 unit oral tablet; Co Q-10 200 mg oral capsule; Depo-Testosterone 200 mg/mL intramuscular oil; Fish Oil 1,000 mg (120 mg-180 mg) oral capsule; garlic 0 oral; lisinopril-hydrochlorothiazide 20-12.5 mg oral tablet; magnesium oxide 400 mg magnesium oral capsule; oxybutynin chloride 5 mg oral tablet; Toprol XL 50 mg oral tablet extended release 24 hr; tumeric oral; vitamin E 400 unit oral capsule         Allergy List  hydroquinone; Marcaine; Septocaine         Family Medical History  Stroke; Heart Disease; Cancer, Unspecified; Hypertension; Colon Cancer; Heart Attack (MI); Prostate cancer; Osteoporosis; Family history of Arthritis         Social History  Alcohol (Never); Alcohol Use (Never); Exercises regularly; lives with spouse; .; Physician; 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); Retired.;  "Tobacco (Never)         Immunizations  Name Date Admin   Hepatitis A    Hepatitis A    Hepatitis A    Hepatitis B    Hepatitis B    Hepatitis B    Influenza    Influenza    Influenza    Meningococcal (MNG)    Meningococcal (MNG)    Meningococcal (MNG)    MMR    MMR    [id number]    Prevnar 13    Shingles    Td    Tdap    Varicella          Review of Systems  · Constitutional  o Denies  o : fatigue, night sweats  · Eyes  o Denies  o : double vision, blurred vision  · HENT  o Denies  o : vertigo, recent head injury  · Cardiovascular  o Denies  o : chest pain, irregular heart beats  · Respiratory  o Denies  o : shortness of breath, productive cough  · Gastrointestinal  o Denies  o : nausea, vomiting  · Genitourinary  o Denies  o : dysuria, urinary retention  · Integument  o Denies  o : hair growth change, new skin lesions  · Neurologic  o Denies  o : altered mental status, seizures  · Musculoskeletal  o Denies  o : joint swelling, limitation of motion  · Endocrine  o Denies  o : cold intolerance, heat intolerance  · Heme-Lymph  o Denies  o : petechiae, lymph node enlargement or tenderness  · Allergic-Immunologic  o Denies  o : frequent illnesses      Vitals  Date Time BP Position Site L\R Cuff Size HR RR TEMP (F) WT  HT  BMI kg/m2 BSA m2 O2 Sat HC       05/26/2020 02:56 /74 Sitting    64 - R  98.7 244lbs 4oz 6'  2\" 31.36 2.41 96 %          Physical Examination  · Constitutional  o Appearance  o : overweight, alert, in no acute distress, well-tended appearance  · Head and Face  o Head  o :   § Inspection  § : atraumatic, normocephalic  o Face  o :   § Inspection  § : no facial lesions  o HEENT  o : Unremarkable  · Eyes  o Conjunctivae  o : conjunctivae normal  o Sclerae  o : sclerae white  o Pupils and Irises  o : pupils equal and round, pupils reactive to light bilaterally  o Eyelids/Ocular Adnexae  o : eyelid appearance normal  · Ears, Nose, Mouth and Throat  o Ears  o :   § External Ears  § : appearance within " normal limits, no lesions present  o Nose  o :   § External Nose  § : appearance normal  o Oral Cavity  o :   § Oral Mucosa  § : oral mucosa normal  § Lips  § : lip appearance normal  § Teeth  § : normal dentition for age  § Gums  § : gums pink, non-swollen, no bleeding present  § Tongue  § : tongue appearance normal  § Palate  § : hard palate normal, soft palate appearance normal  · Neck  o Inspection/Palpation  o : normal appearance, no masses or tenderness, trachea midline  o Thyroid  o : gland size normal, nontender, no nodules or masses present on palpation  · Respiratory  o Respiratory Effort  o : breathing unlabored  o Auscultation of Lungs  o : normal breath sounds  · Cardiovascular  o Heart  o :   § Auscultation of Heart  § : regular rate, normal rhythm, no murmurs present  o Peripheral Vascular System  o :   § Extremities  § : no edema  · Gastrointestinal  o Abdominal Examination  o : abdomen nontender to palpation, normal bowel sounds, tone normal without rigidity or guarding, no masses present  o Liver and spleen  o : no hepatomegaly present  · Lymphatic  o Neck  o : no lymphadenopathy   o Supraclavicular Nodes  o : no supraclavicular nodes          Assessment  · Encounter for Medicare annual wellness exam     V70.0/Z00.00  · Need for hepatitis C screening test     V73.89/Z11.59  He declines at this time.  · Welcome to Medicare preventive visit     V70.0/Z00.00  · Alcohol screening     V79.1/Z13.89    Problems Reconciled  Plan  · Orders  o Falls Risk Assessment Completed (3288F) - V70.0/Z00.00 - 05/26/2020  o Brief hearing screening (written) [facility] () - V70.0/Z00.00 - 05/26/2020  o Presence or absence of urinary incontinence assessed (MINDY) (1090F) - V70.0/Z00.00 - 05/26/2020  o Hepatitis C antibody MEDICARE screening [facility] (02329, ) - V73.89/Z11.59 - 05/26/2020  o Falls Risk Assessment Completed (3288F) - V70.0/Z00.00 - 05/26/2020  o Brief hearing screening (written) [facility] () - V70.0/Z00.00 -  05/26/2020  o Welcome to Medicare Exam () - V70.0/Z00.00 - 05/26/2020  o Annual alcohol screening using the AUDIT-C tool, 15 minutes [facility] ([address]2857, ) - V79.1/Z13.89 - 05/26/2020  o ACO-39: Current medications updated and reviewed () - - 05/26/2020  o ACO-18: Negative screen for clinical depression using a standardized tool () - - 05/26/2020   negative 0 pts.  o ACO-13: Fall Risk Screening with no falls in past year or only one fall without injury in the past year (1101F) - - 05/26/2020   1 fall with no injury  o ACO - Pt declines to or was not able to provide an Advance Care Plan or name a Surrogate Decision Maker (1124F) - - 05/26/2020  · Medications  o Medications have been Reconciled  o Transition of Care or Provider Policy  · Instructions  o Health Risk Assessment has been reviewed with the patient.  o Written health screening schedule for next 5-10 years was established with patient; information scanned in chart and given/mailed to patient.  o Fall prevention methods discussed and a copy of recommendations given/mailed to patient.  o Counseled on the need for diet changes (low carbohydrate; avoid sweets) and exercise.   o Medicare suggests a once in a lifetime screening for Hepatitis C for all Medicare beneficiaries born between 1152-8022.  o Written health screening schedule for next 5-10 years was established with patient; information scanned in chart and given to patient.  o Fall prevention methods discussed and a copy of recommendations given to patient.  o Patient was educated/instructed on their diagnosis, treatment and medications prior to discharge from the clinic today.  o Call the office with any concerns or questions.  · Disposition  o f/u 3 months            Electronically Signed by: TON العلي -Author on May 27, 2020 07:22:36 AM

## 2021-05-13 NOTE — PROGRESS NOTES
Progress Note      Patient Name: Poncho Hernandez   Patient ID: [id number]   Sex: Male   YOB: 1955    Primary Care Provider: Noemi CAMILO   Referring Provider: Ash CAMILO    Visit Date: June 2, 2020    Provider: TON العلي   Location: [facility]   Location Address: [address] [place] [address], [address]  [id number]   Location Phone: [phone]          Chief Complaint  · POSSIBLE DVT F/U      History Of Present Illness  Poncho Hernandez is a 64 year old /White male who presents for evaluation and treatment of:      Presents today for an acute visit for possible DVT in the right lower leg.  Patient reports approximately 1 month ago he noticed his right calf becoming larger.  He has deep right calf pain.  Also edema in the right calf.  Denies shortness of breath and redness of the right lower leg.  He has varicose veins.  Patient also reports discoloration in his bilateral lower extremity.  Decreased capillary refill.  Some numbness on the lateral great toe.       Past Medical History  Aftercare following bilateral knee joint replacement surgery; Anemia, Unspecified; Arthritis; Bladder Disorder; BPH; Depression with anxiety; Elevated prostate specific antigen (PSA); Forgetfulness; Heart Disease; Heart Murmur; Hernia; High blood pressure; Hypertension; Limb Swelling; Low testosterone in male; Night sweats; Prostate Disorder         Past Surgical History  Adenoidectomy; Arm Surgery; Artificial Joints/Limbs; Bilateral total knee arthroplasty; Colonoscopy; Cystoscopy with transurethral resection of prostate (TURP) using button electrode; Hand surgery; Hernia; Joint Surgery; Metal implants; Prostate Biopsy         Medication List  biotin oral; Calcium 500 + D 500 mg(1,250mg) -200 unit oral tablet; Co Q-10 200 mg oral capsule; Depo-Testosterone 200 mg/mL intramuscular oil; Fish Oil 1,000 mg (120 mg-180 mg) oral capsule; garlic 0 oral;  "lisinopril-hydrochlorothiazide 20-12.5 mg oral tablet; magnesium oxide 400 mg magnesium oral capsule; oxybutynin chloride 5 mg oral tablet; Toprol XL 50 mg oral tablet extended release 24 hr; tumeric oral; vitamin E 400 unit oral capsule         Allergy List  hydroquinone; Marcaine; Septocaine         Family Medical History  Stroke; Heart Disease; Cancer, Unspecified; Hypertension; Colon Cancer; Heart Attack (MI); Prostate cancer; Osteoporosis; Family history of Arthritis         Social History  Alcohol (Never); Alcohol Use (Never); Exercises regularly; lives with spouse; .; Physician; 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); Retired.; Tobacco (Never)         Immunizations  Name Date Admin   Hepatitis A    Hepatitis A    Hepatitis A    Hepatitis B    Hepatitis B    Hepatitis B    Influenza    Influenza    Influenza    Meningococcal (MNG)    Meningococcal (MNG)    Meningococcal (MNG)    MMR    MMR    [id number]    Prevnar 13    Shingles    Td    Tdap    Varicella          Review of Systems  · Constitutional  o Denies  o : fatigue, fever, chills, body aches, night sweats  · Cardiovascular  o Admits  o : lower extremity edema  o Denies  o : chest pain, irregular heart beats, rapid heart rate, dyspnea on exertion  · Respiratory  o Denies  o : shortness of breath, wheezing, cough, hemoptysis, dyspnea on exertion  · Gastrointestinal  o Denies  o : nausea, vomiting, diarrhea, constipation, abdominal pain  · Integument  o Denies  o : rash, itching, hair growth change, new skin lesions  · Neurologic  o Admits  o : tingling or numbness  o Denies  o : altered mental status, seizures, tremors  · Musculoskeletal  o Admits  o : Right lower leg pain  o Denies  o : joint swelling, limitation of motion      Vitals  Date Time BP Position Site L\R Cuff Size HR RR TEMP (F) WT  HT  BMI kg/m2 BSA m2 O2 Sat        06/02/2020 03:45 /80 Sitting    90 - R  97.7 241lbs 0oz 6'  2\" 30.94 2.39 97 %          Physical " Examination  · Constitutional  o Appearance  o : alert, in no acute distress  · Head and Face  o Head  o :   § Inspection  § : atraumatic, normocephalic  o Face  o :   § Inspection  § : no facial lesions  o HEENT  o : Unremarkable  · Eyes  o Conjunctivae  o : conjunctivae normal  o Sclerae  o : sclerae white  o Pupils and Irises  o : pupils equal and round, pupils reactive to light bilaterally  o Eyelids/Ocular Adnexae  o : eyelid appearance normal  · Neck  o Inspection/Palpation  o : normal appearance, no masses or tenderness, trachea midline  o Thyroid  o : gland size normal, nontender, no nodules or masses present on palpation  · Respiratory  o Respiratory Effort  o : breathing unlabored  o Auscultation of Lungs  o : normal breath sounds  · Cardiovascular  o Heart  o :   § Auscultation of Heart  § : regular rate, normal rhythm, no murmurs present  o Peripheral Vascular System  o :   § Extremities  § : mild lower extremity edema present, no cyanosis, generalized distal extremity hair loss present, no purpura present, normal capillary refill in toes, varicosities present on calves  · Lymphatic  o Neck  o : no lymphadenopathy   · Musculoskeletal  o Right Lower Extremity  o :   § Inspection/Palpation  § : Right lower calf larger than left, varicose veins bilateral lower extremity. Lower extremities are darker in color which is blanchable  o Left Lower Extremity  o :   § Inspection/Palpation  § : neurovascularly intact to L1-S1, lower extremity decrease circulation          Assessment  · Leg swelling     729.81/M79.89  Duplex scan of the lower extremities to rule out DVT  · Calf pain     729.5/M79.669  · Decreased circulation in fingers or toes     785.9/R09.89  ZAKIA of lower extremities    Problems Reconciled  Plan  · Orders  o ACO-39: Current medications updated and reviewed () - - 06/02/2020  o Duplex scan of veins of both lower extremities for deep venous thrombosis (DVT) (55507) - 729.81/M79.89, 729.5/M79.669  - 06/02/2020  o ZAKIA (ankle brachial index) (75410) - 785.9/R09.89 - 06/02/2020  · Medications  o Medications have been Reconciled  o Transition of Care or Provider Policy  · Instructions  o Patient was educated/instructed on their diagnosis, treatment and medications prior to discharge from the clinic today.  o Patient instructed to seek medical attention urgently for new or worsening symptoms.  o Call the office with any concerns or questions.  · Disposition  o Call or Return if symptoms worsen or persist.  o f/u 1 month            Electronically Signed by: TON العلي -Author on Nika 3, 2020 08:17:33 AM

## 2021-05-13 NOTE — PROGRESS NOTES
Progress Note      Patient Name: Poncho Hernandez   Patient ID: [id number]   Sex: Male   YOB: 1955    Primary Care Provider: Noemi CAMILO   Referring Provider: Ash CAMILO    Visit Date: August 18, 2020    Provider: Adi Renteria MD   Location: [facility]   Location Address: [address] [place] [address]  [id number]   Location Phone: [phone]          Chief Complaint  · Right Total Knee Arthroplasty      History Of Present Illness  Poncho Hernandez is a 64 year old /White male who presents today to [facility].      Patient presents today with a follow-up of right total knee arthroplasty. Patient presents today for right knee status-post arthroplasty performed 3/19/2018 by Dr. Renteria. Patient has been attending PT and it has helped him immensely since his last visit. On one of his last days of PT, one of the PT staff noticed he was bending his right knee inward which concerned the patient and staff. Patient presents with a concern of tightness in his left shoulder as well.                               Past Medical History  Aftercare following bilateral knee joint replacement surgery; Anemia, Unspecified; Arthritis; Bladder Disorder; BPH; Depression with anxiety; Elevated Prostate Specific Antigen (PSA); Forgetfulness; Heart Disease; Heart Murmur; Hernia; High blood pressure; Hypertension; Limb Swelling; Low testosterone in male; Night sweats; Prostate Disorder         Past Surgical History  Adenoidectomy; Arm Surgery; Artificial Joints/Limbs; Bilateral total knee arthroplasty; Colonoscopy; Cystoscopy with transurethral resection of prostate (TURP) using button electrode; Hand surgery; Hernia; Joint Surgery; Metal implants; Prostate Biopsy         Medication List  biotin oral; Calcium 500 + D 500 mg(1,250mg) -200 unit oral tablet; Co Q-10 200 mg oral capsule; Depo-Testosterone 200 mg/mL intramuscular oil; Fish Oil 1,000 mg (120 mg-180 mg) oral  "capsule; garlic 0 oral; lisinopril-hydrochlorothiazide 20-12.5 mg oral tablet; magnesium oxide 400 mg magnesium oral capsule; oxybutynin chloride 5 mg oral tablet; Toprol XL 50 mg oral tablet extended release 24 hr; tumeric oral; vitamin E 400 unit oral capsule         Allergy List  hydroquinone; Marcaine; Septocaine       Allergies Reconciled  Family Medical History  Stroke; Heart Disease; Cancer, Unspecified; Hypertension; Colon Cancer; Heart Attack (MI); Prostate cancer; Osteoporosis; Family history of Arthritis         Social History  Alcohol (Never); Alcohol Use (Never); Exercises regularly; lives with spouse; .; Physician; 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); Retired.; Tobacco (Never)         Immunizations  Name Date Admin   Hepatitis A    Hepatitis A    Hepatitis A    Hepatitis B    Hepatitis B    Hepatitis B    Influenza    Influenza    Influenza    Meningococcal (MNG)    Meningococcal (MNG)    Meningococcal (MNG)    MMR    MMR    [id number]    Prevnar 13    Shingles    Td    Tdap    Varicella          Review of Systems  · Constitutional  o Denies  o : fever, chills, weight loss  · Cardiovascular  o Denies  o : chest pain, shortness of breath  · Gastrointestinal  o Denies  o : liver disease, heartburn, nausea, blood in stools  · Genitourinary  o Denies  o : painful urination, blood in urine  · Integument  o Denies  o : rash, itching  · Neurologic  o Denies  o : headache, weakness, loss of consciousness  · Musculoskeletal  o Denies  o : painful, swollen joints  · Psychiatric  o Denies  o : drug/alcohol addiction, anxiety, depression      Vitals  Date Time BP Position Site L\R Cuff Size HR RR TEMP (F) WT  HT  BMI kg/m2 BSA m2 O2 Sat        08/18/2020 01:30 PM      54 - R   235lbs 0oz 6'  2\" 30.17 2.36 97 %          Physical Examination  · Constitutional  o Appearance  o : well developed, well-nourished, no obvious deformities present  · Head and Face  o Head  o :   § Inspection  § : " normocephalic  o Face  o :   § Inspection  § : no facial lesions  · Eyes  o Conjunctivae  o : conjunctivae normal  o Sclerae  o : sclerae white  · Ears, Nose, Mouth and Throat  o Ears  o :   § External Ears  § : appearance within normal limits  § Hearing  § : intact  o Nose  o :   § External Nose  § : appearance normal  · Neck  o Inspection/Palpation  o : normal appearance  o Range of Motion  o : full range of motion  · Respiratory  o Respiratory Effort  o : breathing unlabored  o Inspection of Chest  o : normal appearance  o Auscultation of Lungs  o : no audible wheezing or rales  · Cardiovascular  o Heart  o : regular rate  · Gastrointestinal  o Abdominal Examination  o : soft and non-tender  · Skin and Subcutaneous Tissue  o General Inspection  o : intact, no rashes  · Psychiatric  o General  o : Alert and oriented x3  o Judgement and Insight  o : judgment and insight intact  o Mood and Affect  o : mood normal, affect appropriate  · Left Shoulder  o Inspection  o : Full extension and flexion of shoulder. Full ROM of shoulder. Mild pain with ROM. No swelling. No skin discoloration. Sensation grossly intact. Pulses normal.   · Right Knee  o Inspection  o : Full extension, tender over the lateral knee, tender over anterior knee, full flexion, well-healed scar over the anterior knee. Sensation grossly intact. Stable gait. Pulses normal.           Assessment  · Pain: Left Shoulder     719.41/M25.519  · History of total knee arthroplasty, right     V43.65/Z96.651      Plan  · Medications  o Medications have been Reconciled  o Transition of Care or Provider Policy  · Instructions  o Dr. Renteria saw and examined the patient and agrees with plan.   o Reviewed the patient's Past Medical, Social, and Family history as well as the ROS at today's visit, no changes.  o Call or return if worsening symptoms.  o Follow Up PRN.  o This note was transcribed by Herlinda Roberts.   o Discussed treatment options and diagnosis with  patient. Patient opted with some at home exercises for his shoulders and knee. Patient will follow-up as needed if pain worsens.             Electronically Signed by: Herlinda Roberts-, Other -Author on August 20, 2020 09:54:08 AM  Electronically Co-signed by: Adi Renteria MD -Reviewer on August 20, 2020 02:12:51 PM

## 2021-05-13 NOTE — PROGRESS NOTES
Quick Note      Patient Name: Poncho Hernandez   Patient ID: [id number]   Sex: Male   YOB: 1955    Primary Care Provider: Noemi CAMILO   Referring Provider: Ash CAMILO    Visit Date: May 21, 2020    Provider: Kenneth Iglesias MD   Location: [facility]   Location Address: [address] [place] [address], [facility]   MIGUEL Reid  [id number]   Location Phone: [phone]          History Of Present Illness  TELEHEALTH TELEPHONE VISIT  Chief Complaint: Hypertension   Poncho Hernandez is a 64 year old gentleman who is presenting for evaluation via telehealth telephone visit. Telehealth visit performed due to COVID-19. Verbal consent obtained before beginning visit. The patient has a history of known left ventricular hypertrophy with some asymmetrical septal hypertrophy, nonocclusive coronary artery disease, hypertension, and paroxysmal atrial fibrillation who has been doing well. Denies any chest pain or shortness of breath.   Provider spent 5 minutes with the patient during telehealth visit.   The following staff were present during this visit: Provider only.   Past Medical History/Overview of Patient Symptoms     PAST MEDICAL HISTORY:  Hypertension with left ventricular hypertrophy and some asymmetric septal hypertrophy; coronary artery disease, nonocclusive; hypertension; paroxysmal atrial fibrillation.      FAMILY HISTORY:   Positive for hypertension. Negative for diabetes and heart disease.      PSYCHOSOCIAL HISTORY: The patient does not drink alcohol and does not use tobacco.  Sometimes has occasional mood change or depression.     CURRENT MEDICATIONS:  Lisinopril 20 mg daily; HCTZ 12.5 mg daily; Toprol 50 mg daily; Oxybutynin 5 mg daily; testosterone every two weeks.  The dosage and frequency of the medications were reviewed with the patient.     REVIEW OF SYSTEMS: Negative for chest pain, palpitation, shortness of breath, swelling, chronic or frequent cough, asthma or  wheezing.       Vitals     Per patient, at-home vitals:  Blood pressure 134/85, heart rate 64, weight 234.           Assessment     ASSESSMENT AND PLAN:  1.   Paroxysmal atrial fibrillation.   Patient remains in normal sinus rhythm.  Encouraged him again about the         importance of anti-coagulation.  He refuses Coumadin or NOACs.  Recommend at least aspirin 81 mg once         a day.    2.   Hypertension, controlled.  3.   Asymmetric left ventricular hypertrophy with no outflow obstruction.  Blood pressures currently under         control.   4.   Coronary artery disease seen incidentally on CT scan.  No anginal discomfort.       MD FAINA Mcbride/evgeny    This note was transcribed by Sharlene Demarco.  evgeny/faina  The above service was transcribed by Sharlene Demarco, and I attest to the accuracy of the note.  FAINA             Electronically Signed by: Prisca Demarco-, Other -Author on June 1, 2020 01:24:34 PM  Electronically Co-signed by: Kenneth Iglesias MD -Reviewer on June 6, 2020 02:08:55 PM

## 2021-05-13 NOTE — PROGRESS NOTES
Progress Note      Patient Name: Poncho Hernandez   Patient ID: [id number]   Sex: Male   YOB: 1955    Primary Care Provider: Noemi CAMILO   Referring Provider: Ash CAMILO    Visit Date: May 7, 2020    Provider: TON العلي   Location: [facility]   Location Address: [address] [place] [address], [address]  [id number]   Location Phone: [phone]          Chief Complaint  · DISCUSS MEDS  · REFERRAL      History Of Present Illness  Poncho Hernandez is a 64 year old /White male who presents for evaluation and treatment of:      Presents today to discuss meds and request referral for physical therapy.  Patient is seeing Dr. Berger for his low testosterone.  He is getting testosterone injections here in office.  He has an increase of energy and stamina.  He has some weakness in his quadriceps muscles.  [facility].  He is needing a new referral.       Past Medical History  Aftercare following bilateral knee joint replacement surgery; Anemia, Unspecified; Arthritis; Bladder Disorder; BPH; Depression with anxiety; Elevated prostate specific antigen (PSA); Forgetfulness; Heart Disease; Heart Murmur; Hernia; High blood pressure; Hypertension; Limb Swelling; Low testosterone in male; Night sweats; Prostate Disorder         Past Surgical History  Adenoidectomy; Arm Surgery; Artificial Joints/Limbs; Bilateral total knee arthroplasty; Colonoscopy; Cystoscopy with transurethral resection of prostate (TURP) using button electrode; Hand surgery; Hernia; Joint Surgery; Metal implants; Prostate Biopsy         Medication List  biotin oral; Calcium 500 + D 500 mg(1,250mg) -200 unit oral tablet; Co Q-10 200 mg oral capsule; Depo-Testosterone 200 mg/mL intramuscular oil; Fish Oil 1,000 mg (120 mg-180 mg) oral capsule; garlic 0 oral; lisinopril-hydrochlorothiazide 20-12.5 mg oral tablet; magnesium oxide 400 mg magnesium oral capsule;  "oxybutynin chloride 5 mg oral tablet; Toprol XL 50 mg oral tablet extended release 24 hr; tumeric oral; vitamin E 400 unit oral capsule         Allergy List  hydroquinone; Marcaine; Septocaine       Allergies Reconciled  Family Medical History  Stroke; Heart Disease; Cancer, Unspecified; Hypertension; Colon Cancer; Heart Attack (MI); Prostate cancer; Osteoporosis; Family history of Arthritis         Social History  Alcohol (Never); Alcohol Use (Never); Exercises regularly; lives with spouse; .; Physician; 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); Retired.; Tobacco (Never)         Immunizations  Name Date Admin   Hepatitis A    Hepatitis A    Hepatitis A    Hepatitis B    Hepatitis B    Hepatitis B    Influenza    Influenza    Influenza    Meningococcal (MNG)    Meningococcal (MNG)    Meningococcal (MNG)    MMR    MMR    [id number]    Prevnar 13    Shingles    Td    Tdap    Varicella          Review of Systems  · Constitutional  o Denies  o : fatigue, fever, chills  · Eyes  o Denies  o : blurred vision, changes in vision  · HENT  o Denies  o : headaches, vertigo, lightheadedness  · Cardiovascular  o Denies  o : lower extremity edema, claudication, chest pressure, palpitations  · Respiratory  o Denies  o : shortness of breath, wheezing, cough, hemoptysis, dyspnea on exertion  · Gastrointestinal  o Denies  o : nausea, vomiting, diarrhea, constipation, abdominal pain  · Genitourinary  o Denies  o : frequency, dysuria, hematuria  · Integument  o Denies  o : rash, new skin lesions  · Neurologic  o Admits  o : muscular weakness  o Denies  o : altered mental status, tingling or numbness  · Musculoskeletal  o Admits  o : hip pain  o Denies  o : joint pain, joint swelling, muscle pain      Vitals  Date Time BP Position Site L\R Cuff Size HR RR TEMP (F) WT  HT  BMI kg/m2 BSA m2 O2 Sat        05/07/2020 03:24 /88 Sitting    72 - R  97.6 247lbs 8oz 6'  2\" 31.78 2.42 94 %          Physical " Examination  · Constitutional  o Appearance  o : alert  · Head and Face  o Head  o :   § Inspection  § : atraumatic, normocephalic  o Face  o :   § Inspection  § : no facial lesions  o HEENT  o : Unremarkable  · Eyes  o Conjunctivae  o : conjunctivae normal  o Sclerae  o : sclerae white  o Pupils and Irises  o : pupils equal and round, pupils reactive to light bilaterally  o Eyelids/Ocular Adnexae  o : eyelid appearance normal  · Neck  o Inspection/Palpation  o : normal appearance, no masses or tenderness, trachea midline  o Thyroid  o : gland size normal, nontender, no nodules or masses present on palpation  · Respiratory  o Respiratory Effort  o : breathing unlabored  o Auscultation of Lungs  o : normal breath sounds  · Cardiovascular  o Heart  o :   § Auscultation of Heart  § : regular rate, normal rhythm, no murmurs present  o Peripheral Vascular System  o :   § Extremities  § : no edema  · Lymphatic  o Neck  o : no lymphadenopathy           Assessment  · Weakness of both lower extremities     729.89/R29.898  Quadricep muscle weakness. He has been having improvement with physical therapy. Continue physical therapy and evaluation per PT    Problems Reconciled  Plan  · Orders  o ACO-39: Current medications updated and reviewed () - - 05/07/2020  o ACO-15: Pneumococcal Vaccine Administered or Previously Received (4040F) - - 05/07/2020  o ACO-19: Colorectal cancer screening results documented and reviewed (3017F) - - 05/07/2020  · Medications  o Medications have been Reconciled  o Transition of Care or Provider Policy  · Instructions  o Patient was educated/instructed on their diagnosis, treatment and medications prior to discharge from the clinic today.  o Patient instructed to seek medical attention urgently for new or worsening symptoms.  o Call the office with any concerns or questions.  · Disposition  o f/u 3 months            Electronically Signed by: TON العلي -Author on May 15, 2020 10:47:52  AM

## 2021-05-13 NOTE — PROGRESS NOTES
Progress Note      Patient Name: Poncho Hernandez   Patient ID: [id number]   Sex: Male   YOB: 1955    Primary Care Provider: Noemi CAMILO   Referring Provider: Anthony CAMILO    Visit Date: November 20, 2020    Provider: Monica Phillips MD   Location: [facility]   Location Address: [address] [place] [address]  [id number]   Location Phone: [phone]          Chief Complaint  · pt here for urologic issues      History Of Present Illness  Chief Complaint: Elevated PSA, BPH with obstruction      PSA recently in October 2019 was 5.59.  This is actually down from his most recent PSA prior which was 6.68.  His PSA has been as high as 9.  He had a negative prostate biopsy in 2011.    His most recent PSA is 5.49 in August 2020.      Since his button TURP in May 2018 and he is doing very well.  He is emptying much better.  Less frequency and urgency.  Nocturia a few times a night.  He states his quality of life is greatly improved.  It has gotten a little worse over the past few months and he is having more nocturia with urgency.  He states his stream is still really good despite the urgency and nocturia.  He has it some times in the day but he is better able to control it. He is still on Ditropan 5mg once a day but he would like to wean off of it and see how he does.      He was recently diagnosed with low T and has started on depo-testosterone about six months ago.      Past uro history   *************  Takes Urinary System Support  - a herbal supplement    He has had an up and down elevated PSA for years with a negative prostate biopsy in 2011.  At that time his PSA was 5.7.  It had been as high as 6.98 in 2014.  It was 3.84 in Dec 2018 and then 9.10 in Feb 2019 in a repeat check.  His most recent PSA is 6.68 in April 2019 after Bactrim for 4 weeks.  He also had another round of Cipro for a UTI.      He has been having more burning lately.     He had a button  TURP that was unremarkable.            Past Medical History  Aftercare following bilateral knee joint replacement surgery; Anemia, Unspecified; Arthritis; Bladder Disorder; BPH; Depression with anxiety; Elevated prostate specific antigen (PSA); Forgetfulness; Heart Disease; Heart Murmur; Hernia; High blood pressure; Hypertension; Limb Swelling; Low testosterone in male; Night sweats; Prostate Disorder         Past Surgical History  Adenoidectomy; Arm Surgery; Artificial Joints/Limbs; Bilateral total knee arthroplasty; Colonoscopy; Cystoscopy with transurethral resection of prostate (TURP) using button electrode; Hand surgery; Hernia; Joint Surgery; Metal implants; Prostate Biopsy         Medication List  biotin oral; Calcium 500 + D 500 mg(1,250mg) -200 unit oral tablet; Co Q-10 200 mg oral capsule; Depo-Testosterone 200 mg/mL intramuscular oil; Fish Oil 1,000 mg (120 mg-180 mg) oral capsule; garlic 0 oral; LISINOPRIL-HCTZ 20/12.5MG TABLETS; lisinopril-hydrochlorothiazide 20-12.5 mg oral tablet; magnesium oxide 400 mg magnesium oral capsule; oxybutynin chloride 5 mg oral tablet; Toprol XL 50 mg oral tablet extended release 24 hr; vitamin E 400 unit oral capsule         Allergy List  hydroquinone; Marcaine; Septocaine         Family Medical History  Stroke; Heart Disease; Cancer, Unspecified; Hypertension; Colon Cancer; Heart Attack (MI); Prostate Cancer; Osteoporosis; Family history of Arthritis         Social History  Alcohol (Never); Exercises regularly; lives with spouse; .; Physician; 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); Retired.; Tobacco (Never)         Immunizations  Name Date Admin   Hepatitis A 04/17/2000   Hepatitis A 08/04/1997   Hepatitis A 07/04/1997   Hepatitis B 08/11/2011   Hepatitis B 06/07/2011   Hepatitis B 04/17/2000   Influenza 10/11/2019   Influenza 10/11/2019   Influenza 10/20/2018   Meningococcal (MNG) 03/25/2013   Meningococcal (MNG) 08/18/2000   Meningococcal (MNG) 04/17/2000   MMR  "01/15/2015   MMR 08/04/1997   [id number] 03/08/2018   Prevnar 13 04/23/2015   Shingles 11/20/2013   Td 03/25/2003   Tdap 03/08/2018   Varicella 02/29/2008         Review of Systems  · Constitutional  o Denies  o : chills, fever  · Gastrointestinal  o Denies  o : nausea, vomiting, flank pain      Vitals  Date Time BP Position Site L\R Cuff Size HR RR TEMP (F) WT  HT  BMI kg/m2 BSA m2 O2 Sat FR L/min FiO2 HC       11/20/2020 10:42 /90 Sitting       235lbs 8oz 6'  2\" 30.24 2.36             Physical Examination  · Constitutional  o Appearance  o : well-nourished, well developed, alert, in no acute distress  · Respiratory  o Respiratory Effort  o : breathing unlabored  · Neurologic  o Gait and Station  o : normal gait, able to stand without difficulty  · Psychiatric  o Judgement and Insight  o : judgment and insight intact, judgement for everyday activities and social situations within normal limits, insight intact  o Mood and Affect  o : mood normal, affect appropriate          Results  · In-Office Procedures  o Lab procedure  § Automated dipstick urinalysis with microscopy ([id number])   § Color Ur: Yellow   § Clarity Ur: Clear   § Glucose Ur Ql Strip: Negative   § Bilirub Ur Ql Strip: Negative   § Ketones Ur Ql Strip: Negative   § Sp Gr Ur Qn: 1.015   § Hgb Ur Ql Strip: Negative   § pH Ur-LsCnc: 6.0   § Prot Ur Ql Strip: Negative   § Urobilinogen Ur Strip-mCnc: 0.2   § Nitrite Ur Ql Strip: Negative   § WBC Est Ur Ql Strip: Negative   § RBC UrnS Qn HPF: 0   § WBC UrnS Qn HPF: 0   § Bacteria UrnS Qn HPF: 0   § Crystals UrnS Qn HPF: 0   § Epithelial Cells (non renal): 0 /HPF  § Epithelial Cells (renal): 0       Assessment  · Benign enlargement of prostate     600.00/N40.0  · Elevated PSA     790.93/R97.20  · Overactive bladder     596.51/N32.81      Plan  · Medications  o Medications have been Reconciled  o Transition of Care or Provider Policy  · Instructions  o I will see him in 6 months. PSA actually down a little " bit.   o I will see him back in 6 months with PSA prior.  o Electronically Identified Patient Education Materials Provided Electronically            Electronically Signed by: Monica Phillips MD -Author on November 20, 2020 11:14:45 AM

## 2021-05-13 NOTE — PROGRESS NOTES
Progress Note      Patient Name: Poncho Hernandez   Patient ID: [id number]   Sex: Male   YOB: 1955    Primary Care Provider: Noemi CAMILO   Referring Provider: Anthony CAMILO    Visit Date: September 1, 2020    Provider: TON Gonzáles   Location: [facility]   Location Address: [address] [place] [address], [address] [place] [address]  [id number]   Location Phone: [phone]          Chief Complaint  · skin tag removal      History Of Present Illness  Poncho Hernandez is a 64 year old /White male who presents for evaluation and treatment of:      Presents today for follow-up on actinic keratosis and a few skin lesions on his right lower leg.  He was seen approximate 1 month ago and had cryotherapy on the actinic keratosis papule on his chest.  Presents today to have repeat cryotherapy.  He also has a couple lesions on his right lower leg.  Appearance of nevus.  He states the one near his right knee is new.  He has a dermatology appointment later this month.  He also reports having a rash bilaterally on his arms and the back of his legs.  States they are red and itchy.  He states his sun exposure helps with the rash.       Past Medical History  Aftercare following bilateral knee joint replacement surgery; Anemia, Unspecified; Arthritis; Bladder Disorder; BPH; Depression with anxiety; Elevated Prostate Specific Antigen (PSA); Forgetfulness; Heart Disease; Heart Murmur; Hernia; High blood pressure; Hypertension; Limb Swelling; Low testosterone in male; Night sweats; Prostate Disorder         Past Surgical History  Adenoidectomy; Arm Surgery; Artificial Joints/Limbs; Bilateral total knee arthroplasty; Colonoscopy; Cystoscopy with transurethral resection of prostate (TURP) using button electrode; Hand surgery; Hernia; Joint Surgery; Metal implants; Prostate Biopsy         Medication List  biotin oral; Calcium 500 + D 500 mg(1,250mg) -200 unit oral tablet; Co Q-10  "200 mg oral capsule; Depo-Testosterone 200 mg/mL intramuscular oil; Fish Oil 1,000 mg (120 mg-180 mg) oral capsule; garlic 0 oral; lisinopril-hydrochlorothiazide 20-12.5 mg oral tablet; magnesium oxide 400 mg magnesium oral capsule; oxybutynin chloride 5 mg oral tablet; Toprol XL 50 mg oral tablet extended release 24 hr; tumeric oral; vitamin E 400 unit oral capsule         Allergy List  hydroquinone; Marcaine; Septocaine         Family Medical History  Stroke; Heart Disease; Cancer, Unspecified; Hypertension; Colon Cancer; Heart Attack (MI); Prostate cancer; Osteoporosis; Family history of Arthritis         Social History  Alcohol (Never); Alcohol Use (Never); Exercises regularly; lives with spouse; .; Physician; 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); Retired.; Tobacco (Never)         Immunizations  Name Date Admin   Hepatitis A    Hepatitis A    Hepatitis A    Hepatitis B    Hepatitis B    Hepatitis B    Influenza    Influenza    Influenza    Meningococcal (MNG)    Meningococcal (MNG)    Meningococcal (MNG)    MMR    MMR    [id number]    Prevnar 13    Shingles    Td    Tdap    Varicella          Review of Systems  · Constitutional  o Denies  o : fever, fatigue, weight loss, weight gain  · Cardiovascular  o Denies  o : lower extremity edema, claudication, chest pressure, palpitations  · Respiratory  o Denies  o : shortness of breath, wheezing, cough, hemoptysis, dyspnea on exertion  · Gastrointestinal  o Denies  o : nausea, vomiting, diarrhea, constipation, abdominal pain  · Integument  o Admits  o : rash, skin dryness, new skin lesions  o Denies  o : itching      Vitals  Date Time BP Position Site L\R Cuff Size HR RR TEMP (F) WT  HT  BMI kg/m2 BSA m2 O2 Sat        09/01/2020 01:08 /76 Sitting    58 - R  98.6 232lbs 16oz 6'  2\" 29.92 2.35 98 %          Physical Examination  · Constitutional  o Appearance  o : alert, in no acute distress  · Head and Face  o Head  o :   § Inspection  § : atraumatic, " normocephalic  o Face  o :   § Inspection  § : no facial lesions  o HEENT  o : Unremarkable  · Eyes  o Conjunctivae  o : conjunctivae normal  o Sclerae  o : sclerae white  o Pupils and Irises  o : pupils equal and round, pupils reactive to light bilaterally  o Eyelids/Ocular Adnexae  o : eyelid appearance normal  · Neck  o Inspection/Palpation  o : normal appearance, no masses or tenderness, trachea midline  o Thyroid  o : gland size normal, nontender, no nodules or masses present on palpation  · Respiratory  o Respiratory Effort  o : breathing unlabored  o Auscultation of Lungs  o : normal breath sounds  · Cardiovascular  o Heart  o :   § Auscultation of Heart  § : regular rate, normal rhythm, no murmurs present  o Peripheral Vascular System  o :   § Extremities  § : no edema  · Lymphatic  o Neck  o : no lymphadenopathy   · Skin and Subcutaneous Tissue  o General Inspection  o : Center of chest is a 4 mm raised papule in several atypical nevus on right lower leg     Risk and benefits of cryotherapy for skin lesion on chest and right knee and to below right knee on the lateral side were discussed with the patient prior to initiating the procedure.  Patient was given opportunity to ask questions.  Patient provided verbal consent to treatment.  Each area of concern was treated with 3-4 rounds of cryotherapy.  Patient overall tolerated the procedure well.  There were no immediate complications.           Assessment  · Hypogonadism in male     257.2/E29.1  Receives testosterone injections  · Actinic keratosis     702.0/L57.0  Has an appointment with dermatology later this month  · Skin lesion     709.9/L98.9  Cryotherapy to skin lesion on chest and 3 on right lower leg    Problems Reconciled  Plan  · Orders  o IM/SQ - Injection Fee [facility] ([id number]) - 257.2/E29.1 - 09/01/2020  o ACO-39: Current medications updated and reviewed () - - 09/01/2020  o 200.00 - Testosterone 200mg (-3) - 257.2/E29.1 - 09/01/2020    Injection - Testosterone 200 mg; Dose: 200 mg; Site: Left Hip; Route: intramuscular; Date: 09/01/2020 14:01:45; Exp: 12/01/2022; Lot: [id number]; Mfg: [employer].; TradeName: Depo-Testosterone; Location: [facility] [place]; Administered By: Lizbeth Gomez CMA; Comment: PT TOLERATED INJ WELL, LEFT OFFICE STABLE   pt brought own vial  o Destruction of 2 to 14 skin lesions ([id number]) - 709.9/L98.9 - 09/02/2020  o DERMATOLOGY CONSULTATION (DERMA) - 709.9/L98.9 - 09/02/2020   he already has made an appointment with dermatology.  · Medications  o Medications have been Reconciled  o Transition of Care or Provider Policy  · Instructions  o Patient was educated/instructed on their diagnosis, treatment and medications prior to discharge from the clinic today.  o Patient instructed to seek medical attention urgently for new or worsening symptoms.  o Call the office with any concerns or questions.            Electronically Signed by: TON Gonzáles -Author on September 22, 2020 06:40:22 PM

## 2021-05-13 NOTE — PROGRESS NOTES
Progress Note      Patient Name: Poncho Hernandez   Patient ID: [id number]   Sex: Male   YOB: 1955    Primary Care Provider: Noemi CAMILO   Referring Provider: Ash CAMILO    Visit Date: August 4, 2020    Provider: TON العلي   Location: [facility]   Location Address: [address] [place] [address], [address]  [id number]   Location Phone: [phone]          Chief Complaint  · skin tag removal      History Of Present Illness  Poncho Hernandez is a 64 year old /White male who presents for evaluation and treatment of:      presents today for an acute visit for skin lesions on chest. The center of chest on on the left side. He has a history of acetic keratosis. He states he has had cryotherapy in the past on these lesions.       Past Medical History  Aftercare following bilateral knee joint replacement surgery; Anemia, Unspecified; Arthritis; Bladder Disorder; BPH; Depression with anxiety; Elevated Prostate Specific Antigen (PSA); Forgetfulness; Heart Disease; Heart Murmur; Hernia; High blood pressure; Hypertension; Limb Swelling; Low testosterone in male; Night sweats; Prostate Disorder         Past Surgical History  Adenoidectomy; Arm Surgery; Artificial Joints/Limbs; Bilateral total knee arthroplasty; Colonoscopy; Cystoscopy with transurethral resection of prostate (TURP) using button electrode; Hand surgery; Hernia; Joint Surgery; Metal implants; Prostate Biopsy         Medication List  biotin oral; Calcium 500 + D 500 mg(1,250mg) -200 unit oral tablet; Co Q-10 200 mg oral capsule; Depo-Testosterone 200 mg/mL intramuscular oil; Fish Oil 1,000 mg (120 mg-180 mg) oral capsule; garlic 0 oral; lisinopril-hydrochlorothiazide 20-12.5 mg oral tablet; magnesium oxide 400 mg magnesium oral capsule; oxybutynin chloride 5 mg oral tablet; Toprol XL 50 mg oral tablet extended release 24 hr; tumeric oral; vitamin E 400 unit oral capsule         Allergy List  hydroquinone;  "Marcaine; Septocaine       Allergies Reconciled  Family Medical History  Stroke; Heart Disease; Cancer, Unspecified; Hypertension; Colon Cancer; Heart Attack (MI); Prostate cancer; Osteoporosis; Family history of Arthritis         Social History  Alcohol (Never); Alcohol Use (Never); Exercises regularly; lives with spouse; .; Physician; 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); Retired.; Tobacco (Never)         Immunizations  Name Date Admin   Hepatitis A    Hepatitis A    Hepatitis A    Hepatitis B    Hepatitis B    Hepatitis B    Influenza    Influenza    Influenza    Meningococcal (MNG)    Meningococcal (MNG)    Meningococcal (MNG)    MMR    MMR    [id number]    Prevnar 13    Shingles    Td    Tdap    Varicella          Review of Systems  · Constitutional  o Denies  o : fatigue, fever, chills, body aches, night sweats  · Integument  o Admits  o : new skin lesions  o Denies  o : rash, itching      Vitals  Date Time BP Position Site L\R Cuff Size HR RR TEMP (F) WT  HT  BMI kg/m2 BSA m2 O2 Sat        08/04/2020 09:16 /82 Sitting    64 - R  97.1 235lbs 4oz 6'  2\" 30.2 2.36 95 %          Physical Examination  · Constitutional  o Appearance  o : alert, in no acute distress  · Head and Face  o Head  o :   § Inspection  § : atraumatic, normocephalic  o Face  o :   § Inspection  § : no facial lesions  o HEENT  o : Unremarkable  · Eyes  o Conjunctivae  o : conjunctivae normal  o Sclerae  o : sclerae white  o Pupils and Irises  o : pupils equal and round, pupils reactive to light bilaterally  o Eyelids/Ocular Adnexae  o : eyelid appearance normal  · Neck  o Inspection/Palpation  o : normal appearance, no masses or tenderness, trachea midline  o Thyroid  o : gland size normal, nontender, no nodules or masses present on palpation  · Respiratory  o Respiratory Effort  o : breathing unlabored  o Auscultation of Lungs  o : normal breath sounds  · Cardiovascular  o Heart  o :   § Auscultation of Heart  § : regular rate, " normal rhythm, no murmurs present  o Peripheral Vascular System  o :   § Extremities  § : no edema  · Lymphatic  o Neck  o : no lymphadenopathy   o Supraclavicular Nodes  o : Center of chest approximately 4 mm polyp that is smooth flesh color. Left side trunk chest area is a 3 mm polyp flesh color that has a rough texture.  · Skin and Subcutaneous Tissue  o General Inspection  o : no lesions present, no areas of discoloration, skin turgor normal     Risk and benefits of cryotherapy for actinic keratosis and skin lesion were discussed with the patient prior to initiating the procedure.  Patient was given opportunity to ask questions.  Patient provided verbal consent to treatment.  Each area of concern was treated with 3-4 rounds of cryotherapy.  Patient overall tolerated the procedure well.  There were no immediate complications.           Assessment  · Skin lesion     709.9/L98.9  · Actinic keratosis     702.0/L57.0  · Hypogonadism in male     257.2/E29.1    Problems Reconciled  Plan  · Orders  o IM/SQ - Injection Fee [facility] ([id number]) - 257.2/E29.1 - 08/04/2020  o ACO-39: Current medications updated and reviewed () - - 08/04/2020  o Cryotherapy - 709.9/L98.9, 702.0/L57.0 - 08/04/2020  o 200.00 - Testosterone 200mg (-9) - 257.2/E29.1 - 08/04/2020   Injection - Testosterone 200 mg; Dose: 200 mg; Site: Left Gluteus; Route: intramuscular; Date: 08/04/2020 10:03:53; Exp: 11/01/2022; Lot: [id number]; Mfg: [employer].; TradeName: Depo-Testosterone; Location: [facility]; Administered By: Lizbeth Gomez CMA; Comment: PT TOLERATED INJ WELL, LEFT OFFICE STABLE   PATIENT BROUGHT IN OWN VIAL OF TESTOSTERONE  · Medications  o Medications have been Reconciled  o Transition of Care or Provider Policy  · Instructions  o Patient was educated/instructed on their diagnosis, treatment and medications prior to discharge from the clinic today.  o Patient instructed to seek medical attention urgently for new or worsening  symptoms.  o Call the office with any concerns or questions.  · Disposition  o Call or Return if symptoms worsen or persist.            Electronically Signed by: TON العلي -Author on August 4, 2020 12:12:08 PM

## 2021-05-14 VITALS
HEART RATE: 65 BPM | WEIGHT: 225.25 LBS | DIASTOLIC BLOOD PRESSURE: 94 MMHG | OXYGEN SATURATION: 99 % | BODY MASS INDEX: 28.91 KG/M2 | SYSTOLIC BLOOD PRESSURE: 144 MMHG | HEIGHT: 74 IN | TEMPERATURE: 97 F

## 2021-05-14 VITALS
HEIGHT: 74 IN | TEMPERATURE: 98.6 F | WEIGHT: 233 LBS | DIASTOLIC BLOOD PRESSURE: 76 MMHG | SYSTOLIC BLOOD PRESSURE: 128 MMHG | BODY MASS INDEX: 29.9 KG/M2 | OXYGEN SATURATION: 98 % | HEART RATE: 58 BPM

## 2021-05-14 VITALS
BODY MASS INDEX: 30.22 KG/M2 | WEIGHT: 235.5 LBS | SYSTOLIC BLOOD PRESSURE: 126 MMHG | DIASTOLIC BLOOD PRESSURE: 90 MMHG | HEIGHT: 74 IN

## 2021-05-14 VITALS
HEART RATE: 62 BPM | SYSTOLIC BLOOD PRESSURE: 174 MMHG | HEIGHT: 74 IN | BODY MASS INDEX: 29.13 KG/M2 | DIASTOLIC BLOOD PRESSURE: 100 MMHG | WEIGHT: 227 LBS

## 2021-05-14 VITALS
TEMPERATURE: 97 F | OXYGEN SATURATION: 98 % | BODY MASS INDEX: 28.64 KG/M2 | HEART RATE: 72 BPM | WEIGHT: 223.12 LBS | SYSTOLIC BLOOD PRESSURE: 144 MMHG | HEIGHT: 74 IN | DIASTOLIC BLOOD PRESSURE: 82 MMHG

## 2021-05-14 VITALS
HEIGHT: 74 IN | DIASTOLIC BLOOD PRESSURE: 70 MMHG | OXYGEN SATURATION: 97 % | TEMPERATURE: 97.3 F | HEART RATE: 78 BPM | BODY MASS INDEX: 29.65 KG/M2 | SYSTOLIC BLOOD PRESSURE: 132 MMHG | WEIGHT: 231 LBS

## 2021-05-14 VITALS — OXYGEN SATURATION: 97 % | HEIGHT: 74 IN | HEART RATE: 78 BPM | BODY MASS INDEX: 29 KG/M2 | WEIGHT: 226 LBS

## 2021-05-14 NOTE — PROGRESS NOTES
Progress Note      Patient Name: Poncho Hernandez   Patient ID: [id number]   Sex: Male   YOB: 1955    Primary Care Provider: Anthony CAMILO   Referring Provider: Anthony CAMILO    Visit Date: February 4, 2021    Provider: Adi Renteria MD   Location: [facility]   Location Address: [address] [place] [address]  [id number]   Location Phone: [phone]          Chief Complaint  · Right Knee Pain      History Of Present Illness  Poncho Hernandez is a 65 year old /White male who presents today to [facility]. Patient is here for evaluation of his right total knee. He had this placed 2 years ago and has been doing reasonably well, however, we have noticed increased weakness in that knee with mild valgus deformity. He has attended some therapy and made some improvements. I believe that he still could make improvements with additional therapy.       Past Medical History  Aftercare following bilateral knee joint replacement surgery; Anemia, Unspecified; Arthritis; Bladder Disorder; BPH; Depression with anxiety; Elevated prostate specific antigen (PSA); Forgetfulness; Heart Disease; Heart Murmur; Hernia; High blood pressure; Hypertension; Limb Swelling; Low testosterone in male; Night sweats; Prostate Disorder         Past Surgical History  Adenoidectomy; Arm Surgery; Artificial Joints/Limbs; Bilateral total knee arthroplasty; Colonoscopy; Cystoscopy with transurethral resection of prostate (TURP) using button electrode; Hand surgery; Hernia; Joint Surgery; Metal implants; Prostate Biopsy         Medication List  biotin oral; Calcium 500 + D 500 mg(1,250mg) -200 unit oral tablet; Co Q-10 200 mg oral capsule; Depo-Testosterone 200 mg/mL intramuscular oil; Fish Oil 1,000 mg (120 mg-180 mg) oral capsule; garlic 0 oral; lisinopril-hydrochlorothiazide 20-12.5 mg oral tablet; magnesium oxide 400 mg magnesium oral capsule; METOPROLOL ER SUCCINATE 50MG TABS; vitamin  "E 400 unit oral capsule         Allergy List  hydroquinone; Marcaine; Septocaine         Family Medical History  Stroke; Heart Disease; Cancer, Unspecified; Hypertension; Colon Cancer; Heart Attack (MI); Prostate cancer; Osteoporosis; Family history of Arthritis         Social History  Alcohol (Never); Alcohol Use (Never); Exercises regularly; lives with spouse; .; Physician; 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); Retired.; Tobacco (Never)         Review of Systems  · Constitutional  o Denies  o : fever, chills, weight loss  · Cardiovascular  o Denies  o : chest pain, shortness of breath  · Gastrointestinal  o Denies  o : liver disease, heartburn, nausea, blood in stools  · Genitourinary  o Denies  o : painful urination, blood in urine  · Integument  o Denies  o : rash, itching  · Neurologic  o Admits  o : weakness  o Denies  o : headache, loss of consciousness  · Musculoskeletal  o Denies  o : painful, swollen joints  · Psychiatric  o Denies  o : drug/alcohol addiction, anxiety, depression      Vitals  Date Time BP Position Site L\R Cuff Size HR RR TEMP (F) WT  HT  BMI kg/m2 BSA m2 O2 Sat FR L/min FiO2 HC       02/04/2021 09:51 AM      78 - R   226lbs 0oz 6'  2\" 29.02 2.31 97 %            Physical Examination  · Constitutional  o Appearance  o : well developed, well-nourished, no obvious deformities present  · Head and Face  o Head  o :   § Inspection  § : normocephalic  o Face  o :   § Inspection  § : no facial lesions  · Eyes  o Conjunctivae  o : conjunctivae normal  o Sclerae  o : sclerae white  · Ears, Nose, Mouth and Throat  o Ears  o :   § External Ears  § : appearance within normal limits  § Hearing  § : intact  o Nose  o :   § External Nose  § : appearance normal  · Neck  o Inspection/Palpation  o : normal appearance  o Range of Motion  o : full range of motion  · Respiratory  o Respiratory Effort  o : breathing unlabored  o Inspection of Chest  o : normal appearance  o Auscultation of Lungs  o : no audible " wheezing or rales  · Cardiovascular  o Heart  o : regular rate  · Gastrointestinal  o Abdominal Examination  o : soft and non-tender  · Skin and Subcutaneous Tissue  o General Inspection  o : intact, no rashes  · Psychiatric  o General  o : Alert and oriented x3  o Judgement and Insight  o : judgment and insight intact  o Mood and Affect  o : mood normal, affect appropriate  · Right Knee  o Inspection  o : His quad is still weak. Sensation intact. Scars well-healed. Walks with a mild valgus deformity.  · In Office Procedures  o View  o : AP/LATERAL/SUNRISE   o Site  o : right, knee  o Indication  o : Right knee pain  o Study  o : X-rays ordered, taken in the office, and reviewed today.  o Xray  o : X-rays show good alignment and fixation of total knee.   o Comparative Data  o : No comparative data found          Assessment  · History of right total knee-Aftercare;following joint replacement     V54.81/Z47.1  · Right knee pain, unspecified chronicity     719.46/M25.561  · Right knee weakness     780.79/R53.1  · Valgus deformity of right knee.     754.69/Q66.6      Plan  · Orders  o Knee (Right) [facility] Preferred View ([id number]) - 719.46/M25.561 - 02/04/2021  · Medications  o Medications have been Reconciled  o Transition of Care or Provider Policy  · Instructions  o Reviewed the patient's Past Medical, Social, and Family history as well as the ROS at today's visit, no changes.  o Call or return if worsening symptoms.  o This note is transcribed by Jaquelin Murray /viri  o Recommend physical therapy for quad strengthening, hamstring stretches, muscle strengthening and see him back and see how he is doing.             Electronically Signed by: Jaquelin Murray, -Author on February 8, 2021 11:59:03 AM  Electronically Co-signed by: Adi Renteria MD -Reviewer on February 8, 2021 05:20:00 PM

## 2021-05-14 NOTE — PROGRESS NOTES
Progress Note      Patient Name: Poncho Hernandez   Patient ID: [id number]   Sex: Male   YOB: 1955    Primary Care Provider: Anthony CAMILO   Referring Provider: Anthony CAMILO    Visit Date: March 24, 2021    Provider: TON Gonzáles   Location: [facility]   Location Address: [address] [place] [address], [address] [place] [address]  [id number]   Location Phone: [phone]          Chief Complaint  · adjustment on meds      History Of Present Illness  Poncho Hernandez is a 65 year old /White male who presents for evaluation and treatment of:      Today for follow-up on hypertension.  He also has a history of urinary frequency he reports that the hydrochlorothiazide has been causing him to diurese in the use of back through more frequently.  Blood pressure has been well controlled per home blood pressure log.  He then started taking 1/2 tablet noticed his blood pressure had raised he then stopped the blood pressure medicine in which his blood pressure murray to 140-150.  History of TURP.    He has been receiving testosterone injections per Dr. Potter.       Past Medical History  Aftercare following bilateral knee joint replacement surgery; Anemia, Unspecified; Arthritis; Bladder disorder; BPH; Depression with anxiety; Elevated prostate specific antigen (PSA); Forgetfulness; Heart Disease; Heart Murmur; Hernia; High blood pressure; Hypertension; Limb Swelling; Low testosterone in male; Night sweats; Prostate Disorder         Past Surgical History  Adenoidectomy; Arm Surgery; Artificial Joints/Limbs; Bilateral total knee arthroplasty; Colonoscopy; Cystoscopy with transurethral resection of prostate (TURP) using button electrode; Hand surgery; Hernia; Joint Surgery; Metal implants; Prostate Biopsy         Medication List  biotin oral; Calcium 500 + D 500 mg(1,250mg) -200 unit oral tablet; Co Q-10 200 mg oral capsule; Depo-Testosterone 200 mg/mL intramuscular oil; Fish  Oil 1,000 mg (120 mg-180 mg) oral capsule; garlic 0 oral; magnesium oxide 400 mg magnesium oral capsule; vitamin E 400 unit oral capsule         Allergy List  hydroquinone; Marcaine; Septocaine         Family Medical History  Stroke; Heart Disease; Cancer, Unspecified; Hypertension; Colon Cancer; Heart Attack (MI); Prostate cancer; Osteoporosis; Family history of Arthritis         Social History  Alcohol (Never); Alcohol Use (Never); Exercises regularly; lives with spouse; .; Physician; 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); Retired.; Tobacco (Never)         Immunizations  Name Date Admin   Hepatitis A 04/17/2000   Hepatitis A 08/04/1997   Hepatitis A 07/04/1997   Hepatitis B 08/11/2011   Hepatitis B 06/07/2011   Hepatitis B 04/17/2000   Influenza 10/11/2019   Influenza 10/11/2019   Influenza 10/20/2018   Meningococcal (MNG) 03/25/2013   Meningococcal (MNG) 08/18/2000   Meningococcal (MNG) 04/17/2000   MMR 01/15/2015   MMR 08/04/1997   [id number] 03/08/2018   Prevnar 13 04/23/2015   Shingles 11/20/2013   Td 03/25/2003   Tdap 03/08/2018   Varicella 02/29/2008         Review of Systems  · Constitutional  o Denies  o : fatigue, night sweats  · Eyes  o Denies  o : double vision, blurred vision  · HENT  o Denies  o : vertigo, recent head injury  · Cardiovascular  o Denies  o : chest pain, irregular heart beats  · Respiratory  o Denies  o : shortness of breath, productive cough  · Gastrointestinal  o Denies  o : nausea, vomiting, diarrhea, constipation, abdominal pain  · Genitourinary  o Admits  o : frequency  o Denies  o : urgency, dysuria, urinary retention  · Integument  o Denies  o : hair growth change, new skin lesions  · Neurologic  o Denies  o : altered mental status, seizures  · Musculoskeletal  o Denies  o : joint swelling, limitation of motion  · Endocrine  o Denies  o : cold intolerance, heat intolerance  · Heme-Lymph  o Denies  o : petechiae, lymph node enlargement or  "tenderness  · Allergic-Immunologic  o Denies  o : frequent illnesses      Vitals  Date Time BP Position Site L\R Cuff Size HR RR TEMP (F) WT  HT  BMI kg/m2 BSA m2 O2 Sat FR L/min FiO2 HC       03/24/2021 01:45 /82 Sitting    72 - R  97 223lbs 2oz 6'  2\" 28.65 2.3 98 %            Physical Examination  · Constitutional  o Appearance  o : well-nourished, well developed  · Head and Face  o Head  o :   § Inspection  § : atraumatic, normocephalic  o Face  o :   § Inspection  § : no facial lesions  o HEENT  o : Unremarkable  · Eyes  o Conjunctivae  o : conjunctivae normal  o Sclerae  o : sclerae white  o Pupils and Irises  o : pupils equal and round, pupils reactive to light bilaterally  o Eyelids/Ocular Adnexae  o : eyelid appearance normal  · Ears, Nose, Mouth and Throat  o Ears  o :   § External Ears  § : appearance within normal limits, no lesions present  o Nose  o :   § External Nose  § : appearance normal  o Oral Cavity  o :   § Oral Mucosa  § : oral mucosa normal  § Lips  § : lip appearance normal  § Teeth  § : normal dentition for age  § Gums  § : gums pink, non-swollen, no bleeding present  § Tongue  § : tongue appearance normal  § Palate  § : hard palate normal, soft palate appearance normal  · Neck  o Inspection/Palpation  o : normal appearance, no masses or tenderness, trachea midline  o Thyroid  o : gland size normal, nontender, no nodules or masses present on palpation  · Respiratory  o Respiratory Effort  o : breathing unlabored  o Auscultation of Lungs  o : normal breath sounds  · Cardiovascular  o Heart  o :   § Auscultation of Heart  § : regular rate, regularly irregular rhythm present, no murmurs present, no pericardial friction rub  o Peripheral Vascular System  o :   § Extremities  § : no edema  · Gastrointestinal  o Abdominal Examination  o : abdomen nontender to palpation, normal bowel sounds, tone normal without rigidity or guarding, no masses present  o Liver and spleen  o : no hepatomegaly " present  · Lymphatic  o Neck  o : no lymphadenopathy   o Supraclavicular Nodes  o : no supraclavicular nodes          Assessment  · Essential hypertension     401.9/I10  D/C hydrochlorothiazide. Start lisinopril 20 mg daily  · Hypogonadism in male     257.2/E29.1  Check LH and FSH at next testosterone injection      Plan  · Orders  o ACO-39: Current medications updated and reviewed (1159F, ) - - 03/24/2021  o LH (luteinizing hormone) (84077) - 257.2/E29.1 - 03/24/2021  o FSH (follicle stimulating hormone) (77644) - 257.2/E29.1 - 03/24/2021  · Medications  o lisinopril 20 mg oral tablet   SIG: take 1 tablet (20 mg) by oral route once daily for 30 days   DISP: (30) Tablet with 2 refills  Prescribed on 03/24/2021     o LISINOPRIL-HCTZ 20/12.5MG TABLETS   SIG: TAKE 1 TABLET BY MOUTH EVERY DAY   DISP: (90) Tablet with 0 refills  Discontinued on 03/24/2021     o lisinopril-hydrochlorothiazide 20-12.5 mg oral tablet   SIG: take 1 tablet by oral route once daily for 90 days   DISP: (90) tablets with 1 refills  Discontinued on 03/24/2021     · Instructions  o Patient was educated/instructed on their diagnosis, treatment and medications prior to discharge from the clinic today.  o Patient instructed to seek medical attention urgently for new or worsening symptoms.  o Call the office with any concerns or questions.  · Disposition  o Call or Return if symptoms worsen or persist.  o f/u 1 month            Electronically Signed by: TON Gonzáles -Author on March 24, 2021 02:30:57 PM

## 2021-05-14 NOTE — PROGRESS NOTES
Progress Note      Patient Name: Poncho Hernandez   Patient ID: [id number]   Sex: Male   YOB: 1955    Primary Care Provider: Anthony CAMILO   Referring Provider: Anthony CAMILO    Visit Date: April 16, 2021    Provider: TON Gonzáles   Location: [facility]   Location Address: [address] [place] [address], [address] [place] [address]  [id number]   Location Phone: [phone]          Chief Complaint  · 1 mo f/u      History Of Present Illness  Poncho Hernandez is a 65 year old /White male who presents for evaluation and treatment of:      Presents today for a follow-up on hypertension.  His hydrochlorothiazide was discontinued at previous visit.  He states he has had less urinary frequency and urgency which has been very beneficial.  He did increase lisinopril 20 mg up to 40 mg over the past month.  He states his blood pressure is 120/7080.  He denies chest pain, syncope, palpitations.  BP today in office is slightly elevated 144/94.  Reviewed labs.  LH FSH is low.  He is getting his testosterone injection today.  He has labs ordered from endocrinologist.       Past Medical History  Aftercare following bilateral knee joint replacement surgery; Anemia, Unspecified; Arthritis; Bladder disorder; BPH; Depression with anxiety; Elevated prostate specific antigen (PSA); Forgetfulness; Heart Disease; Heart Murmur; Hernia; High blood pressure; Hypertension; Limb Swelling; Low testosterone in male; Night sweats; Prostate Disorder         Past Surgical History  Adenoidectomy; Arm Surgery; Artificial Joints/Limbs; Bilateral total knee arthroplasty; Colonoscopy; Cystoscopy with transurethral resection of prostate (TURP) using button electrode; Hand surgery; Hernia; Joint Surgery; Metal implants; Prostate Biopsy         Medication List  biotin oral; Calcium 500 + D 500 mg(1,250mg) -200 unit oral tablet; Co Q-10 200 mg oral capsule; Depo-Testosterone 200 mg/mL intramuscular oil;  Fish Oil 1,000 mg (120 mg-180 mg) oral capsule; garlic 0 oral; lisinopril 40 mg oral tablet; magnesium oxide 400 mg magnesium oral capsule; vitamin E 400 unit oral capsule         Allergy List  hydroquinone; Marcaine; Septocaine         Family Medical History  Stroke; Heart Disease; Cancer, Unspecified; Hypertension; Colon Cancer; Heart Attack (MI); Prostate cancer; Osteoporosis; Family history of Arthritis         Social History  Alcohol (Never); Alcohol Use (Never); Exercises regularly; lives with spouse; .; Physician; 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); Retired.; Tobacco (Never)         Immunizations  Name Date Admin   Hepatitis A 04/17/2000   Hepatitis A 08/04/1997   Hepatitis A 07/04/1997   Hepatitis B 08/11/2011   Hepatitis B 06/07/2011   Hepatitis B 04/17/2000   Influenza 10/11/2019   Influenza 10/11/2019   Influenza 10/20/2018   Meningococcal (MNG) 03/25/2013   Meningococcal (MNG) 08/18/2000   Meningococcal (MNG) 04/17/2000   MMR 01/15/2015   MMR 08/04/1997   Oxfdgtccw14 03/08/2018   Prevnar 13 04/23/2015   Shingles 11/20/2013   Td 03/25/2003   Tdap 03/08/2018   Varicella 02/29/2008         Review of Systems  · Constitutional  o Denies  o : fatigue, fever, chills  · Eyes  o Denies  o : blurred vision, changes in vision  · HENT  o Denies  o : headaches, vertigo, lightheadedness  · Cardiovascular  o Denies  o : chest pain, irregular heart beats, rapid heart rate, dyspnea on exertion  · Respiratory  o Denies  o : shortness of breath, wheezing, cough  · Gastrointestinal  o Denies  o : nausea, vomiting, diarrhea, constipation, abdominal pain, blood in stools, melena  · Genitourinary  o Denies  o : urgency, frequency, dysuria, hematuria  · Integument  o Denies  o : rash, new skin lesions  · Musculoskeletal  o Denies  o : joint pain, joint swelling, muscle pain  · Endocrine  o Admits  o : central obesity  o Denies  o : polyuria, polydipsia  · Psychiatric  o Denies  o : anxiety, depression      Vitals  Date Time  "BP Position Site L\R Cuff Size HR RR TEMP (F) WT  HT  BMI kg/m2 BSA m2 O2 Sat FR L/min FiO2 HC       04/16/2021 09:41 /94 Sitting    65 - R  97 225lbs 4oz 6'  2\" 28.92 2.31 99 %            Physical Examination  · Constitutional  o Appearance  o : alert, in no acute distress  · Head and Face  o Head  o :   § Inspection  § : atraumatic, normocephalic  o Face  o :   § Inspection  § : no facial lesions  o HEENT  o : Unremarkable  · Eyes  o Conjunctivae  o : conjunctivae normal  o Sclerae  o : sclerae white  o Pupils and Irises  o : pupils equal and round, pupils reactive to light bilaterally  o Eyelids/Ocular Adnexae  o : eyelid appearance normal  · Ears, Nose, Mouth and Throat  o Ears  o :   § External Ears  § : appearance within normal limits, no lesions present  o Nose  o :   § External Nose  § : appearance normal  o Oral Cavity  o :   § Oral Mucosa  § : oral mucosa normal  § Lips  § : lip appearance normal  § Teeth  § : normal dentition for age  § Gums  § : gums pink, non-swollen, no bleeding present  § Tongue  § : tongue appearance normal  § Palate  § : hard palate normal, soft palate appearance normal  · Neck  o Inspection/Palpation  o : normal appearance, no masses or tenderness, trachea midline  o Thyroid  o : gland size normal, nontender, no nodules or masses present on palpation  · Respiratory  o Respiratory Effort  o : breathing unlabored  o Auscultation of Lungs  o : normal breath sounds  · Cardiovascular  o Heart  o :   § Auscultation of Heart  § : regular rate, normal rhythm, no murmurs present  o Peripheral Vascular System  o :   § Extremities  § : no edema  · Gastrointestinal  o Abdominal Examination  o : abdomen nontender to palpation, normal bowel sounds, tone normal without rigidity or guarding, no masses present  o Liver and spleen  o : no hepatomegaly present  · Lymphatic  o Neck  o : no lymphadenopathy   o Supraclavicular Nodes  o : no supraclavicular nodes          Assessment  · Essential " hypertension     401.9/I10  Refill lisinopril 40 mg daily.      Plan  · Orders  o ACO-39: Current medications updated and reviewed (, 1159F) - - 04/16/2021  · Medications  o lisinopril 40 mg oral tablet   SIG: take 1 tablet (40 mg) by oral route once daily for 90 days   DISP: (90) Tablet with 1 refills  Adjusted on 04/16/2021     · Instructions  o Patient advised to monitor blood pressure (B/P) at home and journal readings. Patient informed that a B/P reading at home of more than 130/80 is considered hypertension. For readings greater kiid968/90 or higher patient is advised to follow up in the office with readings for management. Patient advised to limit sodium intake.  o Patient was educated/instructed on their diagnosis, treatment and medications prior to discharge from the clinic today.  o Patient instructed to seek medical attention urgently for new or worsening symptoms.  o Call the office with any concerns or questions.  · Disposition  o Call or Return if symptoms worsen or persist.  o f/u 3 months            Electronically Signed by: TON Gonzáles -Author on April 16, 2021 10:20:21 AM

## 2021-05-14 NOTE — PROGRESS NOTES
"   Progress Note      Patient Name: Poncho Hernandez   Patient ID: [id number]   Sex: Male   YOB: 1955    Primary Care Provider: Anthony CAMILO   Referring Provider: Anthony CAMILO    Visit Date: January 6, 2021    Provider: Kenneth Iglesias MD   Location: [facility]   Location Address: [address] [place] [address], [address]   [place]  [id number]   Location Phone: [phone]          Chief Complaint     Afib.       History Of Present Illness  REFERRING CARE PROVIDER: Anthony CAMILO   Poncho Hernandez is a 65 year old /White male with paroxysmal atrial fibrillation, hypertension, nonocclusive coronary artery disease, and LVH. No new problems or complaints. Denies chest pain or shortness of breath. Patient states his home blood pressure has been better controlled than it was here today.   PAST MEDICAL HISTORY: Coronary artery disease; Hypertension; Paroxysmal atrial fibrillation.   PSYCHOSOCIAL HISTORY: Previously smoked, but quit in 1977. Denies alcohol use.   CURRENT MEDICATIONS: Metoprolol ER 50 mg daily; lisinopril-hydrochlorothiazide 20-12.5 mg daily; hydroxyzine 25 mg q. h.s. p.r.n.; Depo-Testosterone 200 mg/mL intramuscular.      ALLERGIES: Hydroquinone; Marcaine; Septocaine.       Review of Systems  · Cardiovascular  o Denies  o : palpitations (fast, fluttering, or skipping beats), swelling (feet, ankles, hands), shortness of breath while walking or lying flat, chest pain or angina pectoris   · Respiratory  o Denies  o : chronic or frequent cough      Vitals  Date Time BP Position Site L\R Cuff Size HR RR TEMP (F) WT  HT  BMI kg/m2 BSA m2 O2 Sat FR L/min FiO2 HC       01/06/2021 11:50 /100 Sitting    62 - R   227lbs 0oz 6'  2\" 29.14 2.32       01/06/2021 11:50 /108 Sitting                       Physical Examination  · Constitutional  o Appearance  o : Awake, alert, in no acute distress.   · Eyes  o Conjunctivae  o : Normal.  · Ears, Nose, Mouth and " Throat  o Oral Cavity  o :   § Oral Mucosa  § : Normal.  · Neck  o Inspection/Palpation  o : No JVD. Good carotid upstroke. No thyromegaly.  · Respiratory  o Respiratory  o : Good respiratory effort. Clear to percussion and auscultation.  · Cardiovascular  o Heart  o :   § Auscultation of Heart  § : S1, S2 normal. Regular rate and rhythm without murmurs, gallops, or rubs.  o Peripheral Vascular System  o :   § Extremities  § : Good femoral and pedal pulses. No pedal edema.  · Gastrointestinal  o Abdominal Examination  o : Soft. No tenderness or masses felt. No hepatosplenomegaly. Abdominal aorta is not palpable.  · EKG  o EKG  o : Performed in the office today.  o Indications  o : Afib.  o Results  o : Normal sinus rhythm. Possible left atrial enlargement. Mild ST-elevation, most likely secondary to early repolarization.   o Comparison  o : No change from prior EKG.   · Labs  o Labs  o : Creatinine 1.03. Potassium 4.0.          Assessment     ASSESSMENT & PLAN:     1.  Paroxysmal atrial fibrillation, symptomatically maintaining normal sinus rhythm.  Did discuss again with        patient consideration for anticoagulation given CHADS-VASc score of 2.  He was agreeable for a trial of        Eliquis 5 mg b.i.d.   2.  Hypertension, mildly elevated at home.  Patient has been doing better, he states.  Recommended keeping a        home blood pressure log for following.  Counseled on low-sodium diet.             Electronically Signed by: Zita Boswell-, Other -Author on January 8, 2021 01:49:08 PM  Electronically Co-signed by: Kenneth Iglesias MD -Reviewer on January 11, 2021 02:05:33 PM

## 2021-05-14 NOTE — PROGRESS NOTES
Progress Note      Patient Name: Poncho Hernandez   Patient ID: [id number]   Sex: Male   YOB: 1955    Primary Care Provider: Anthony CAMILO   Referring Provider: Anthony CAMILO    Visit Date: December 23, 2020    Provider: TON Gonzáles   Location: [facility]   Location Address: [address] [place] [address], [address] [place] [address]  [id number]   Location Phone: [phone]          Chief Complaint  · discuss pt/psa      History Of Present Illness  Poncho Hernandez is a 65 year old /White male who presents for evaluation and treatment of:      Presents today for follow-up on elevated PSA.  He has had elevated PSA in the past.  He has had a TURP procedure and was evaluated by urology.  He was recently started on testosterone by endocrinology PSA on 12/2/2020 from endocrinology was 4.71.  On 12/17/20 PSA was 8.22.  Estrogen level is elevated at 167.  In May 2020 estrogen level was 219.7.    He reports he has been experiencing decreased strength in right knee pain over the past several months.  He had a knee replacement in May 2018.  When walking up the stairs he noticed pain in his right knee and weakness.  Denies redness and swelling.    Over the past several months he noticed a lesion in his right hand between his thumb and pointer finger.  It has become firm and raised. wart like appearance.       Past Medical History  Aftercare following bilateral knee joint replacement surgery; Anemia, Unspecified; Arthritis; Bladder Disorder; BPH; Depression with anxiety; Elevated prostate specific antigen (PSA); Forgetfulness; Heart Disease; Heart Murmur; Hernia; High blood pressure; Hypertension; Limb Swelling; Low testosterone in male; Night sweats; Prostate Disorder         Past Surgical History  Adenoidectomy; Arm Surgery; Artificial Joints/Limbs; Bilateral total knee arthroplasty; Colonoscopy; Cystoscopy with transurethral resection of prostate (TURP) using button  electrode; Hand surgery; Hernia; Joint Surgery; Metal implants; Prostate Biopsy         Medication List  biotin oral; Calcium 500 + D 500 mg(1,250mg) -200 unit oral tablet; Co Q-10 200 mg oral capsule; Depo-Testosterone 200 mg/mL intramuscular oil; Fish Oil 1,000 mg (120 mg-180 mg) oral capsule; garlic 0 oral; lisinopril-hydrochlorothiazide 20-12.5 mg oral tablet; magnesium oxide 400 mg magnesium oral capsule; METOPROLOL ER SUCCINATE 50MG TABS; vitamin E 400 unit oral capsule         Allergy List  hydroquinone; Marcaine; Septocaine         Family Medical History  Stroke; Heart Disease; Cancer, Unspecified; Hypertension; Colon Cancer; Heart Attack (MI); Prostate cancer; Osteoporosis; Family history of Arthritis         Social History  Alcohol (Never); Exercises regularly; lives with spouse; .; Physician; 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); Retired.; Tobacco (Never)         Immunizations  Name Date Admin   Hepatitis A 04/17/2000   Hepatitis A 08/04/1997   Hepatitis A 07/04/1997   Hepatitis B 08/11/2011   Hepatitis B 06/07/2011   Hepatitis B 04/17/2000   Influenza 10/11/2019   Influenza 10/11/2019   Influenza 10/20/2018   Meningococcal (MNG) 03/25/2013   Meningococcal (MNG) 08/18/2000   Meningococcal (MNG) 04/17/2000   MMR 01/15/2015   MMR 08/04/1997   Ocdesfrfa15 03/08/2018   Prevnar 13 04/23/2015   Shingles 11/20/2013   Td 03/25/2003   Tdap 03/08/2018   Varicella 02/29/2008         Review of Systems  · Constitutional  o Denies  o : fatigue, fever, body aches, night sweats  · Eyes  o Denies  o : double vision, blurred vision, changes in vision  · HENT  o Denies  o : headaches, vertigo, lightheadedness, recent head injury  · Cardiovascular  o Denies  o : lower extremity edema, claudication, chest pressure, palpitations  · Respiratory  o Denies  o : shortness of breath, wheezing, cough, hemoptysis, dyspnea on exertion  · Gastrointestinal  o Denies  o : nausea, vomiting, diarrhea, constipation, abdominal  "pain  · Genitourinary  o Denies  o : dysuria, urinary retention  · Integument  o Admits  o : new skin lesions  o Denies  o : rash, itching  · Musculoskeletal  o Admits  o : joint pain, muscular weakness, knee pain  o Denies  o : joint swelling, limitation of motion  · Endocrine  o Denies  o : polyuria, polydipsia, cold intolerance, heat intolerance      Vitals  Date Time BP Position Site L\R Cuff Size HR RR TEMP (F) WT  HT  BMI kg/m2 BSA m2 O2 Sat FR L/min FiO2        12/23/2020 01:45 /70 Sitting    78 - R  97.3 231lbs 0oz 6'  2\" 29.66 2.34 97 %            Physical Examination  · Constitutional  o Appearance  o : alert, in no acute distress  · Head and Face  o Head  o :   § Inspection  § : atraumatic, normocephalic  o Face  o :   § Inspection  § : no facial lesions  o HEENT  o : Unremarkable  · Eyes  o Conjunctivae  o : conjunctivae normal  o Sclerae  o : sclerae white  o Pupils and Irises  o : pupils equal and round, pupils reactive to light bilaterally  o Eyelids/Ocular Adnexae  o : eyelid appearance normal  · Neck  o Inspection/Palpation  o : normal appearance, no masses or tenderness, trachea midline  o Thyroid  o : gland size normal, nontender, no nodules or masses present on palpation  · Respiratory  o Respiratory Effort  o : breathing unlabored  o Auscultation of Lungs  o : normal breath sounds  · Cardiovascular  o Heart  o :   § Auscultation of Heart  § : regular rate, normal rhythm, no murmurs present  o Peripheral Vascular System  o :   § Extremities  § : no edema  · Gastrointestinal  o Abdominal Examination  o : abdomen nontender to palpation, normal bowel sounds, tone normal without rigidity or guarding, no masses present  o Liver and spleen  o : no hepatomegaly present  · Lymphatic  o Neck  o : no lymphadenopathy   o Supraclavicular Nodes  o : no supraclavicular nodes  · Skin and Subcutaneous Tissue  o General Inspection  o : Right hand between thumb and finger is a 5 mm papule that is formed " and raised. Slightly darker pigmentation.     Risk and benefits of cryotherapy for wart were discussed with the patient prior to initiating the procedure.  Patient was given opportunity to ask questions.  Patient provided verbal consent to treatment.  Each area of concern was treated with 3-4 rounds of cryotherapy.  Patient overall tolerated the procedure well.  There were no immediate complications.               Assessment  · Leg weakness     729.89/R29.898  · Knee pain, right     719.46/M25.561  Order physical therapy for right knee pain and weakness  · Elevated PSA     790.93/R97.20  Recheck PSA  · Wart     078.10/B07.9  Cryotherapy  · Low testosterone     790.99/R79.89  Check progesterone      Plan  · Orders  o ACO-39: Current medications updated and reviewed (1159F, ) - - 12/23/2020  o PHYSICAL THERAPY CONSULTATION ([facility]) - 729.89/R29.898, 719.46/M25.561 - 12/23/2020   [place]  o PSA ultrasensitive DIAGNOSTIC [facility] (89297) - 790.93/R97.20 - 12/23/2020  o Progesterone (Serum) (53165) - 790.93/R97.20, 790.99/R79.89 - 12/23/2020  o Destruction of 1 to 14 benign lesions (81554) - 078.10/B07.9 - 12/31/2020  · Instructions  o Patient was educated/instructed on their diagnosis, treatment and medications prior to discharge from the clinic today.  o Patient instructed to seek medical attention urgently for new or worsening symptoms.  o Call the office with any concerns or questions.  · Disposition  o Call or Return if symptoms worsen or persist.  o f/u 1 month            Electronically Signed by: TON Gonzáles -Author on December 31, 2020 07:30:01 AM

## 2021-05-15 VITALS
TEMPERATURE: 97.2 F | DIASTOLIC BLOOD PRESSURE: 76 MMHG | SYSTOLIC BLOOD PRESSURE: 148 MMHG | HEIGHT: 74 IN | WEIGHT: 243.5 LBS | BODY MASS INDEX: 31.25 KG/M2 | OXYGEN SATURATION: 97 % | HEART RATE: 62 BPM

## 2021-05-15 VITALS
BODY MASS INDEX: 31.35 KG/M2 | DIASTOLIC BLOOD PRESSURE: 74 MMHG | OXYGEN SATURATION: 96 % | HEIGHT: 74 IN | HEART RATE: 66 BPM | SYSTOLIC BLOOD PRESSURE: 138 MMHG | WEIGHT: 244.25 LBS | TEMPERATURE: 98.7 F

## 2021-05-15 VITALS
SYSTOLIC BLOOD PRESSURE: 136 MMHG | BODY MASS INDEX: 30.35 KG/M2 | HEIGHT: 74 IN | TEMPERATURE: 97.8 F | OXYGEN SATURATION: 97 % | DIASTOLIC BLOOD PRESSURE: 84 MMHG | HEART RATE: 68 BPM | WEIGHT: 236.5 LBS

## 2021-05-15 VITALS
BODY MASS INDEX: 31.32 KG/M2 | HEART RATE: 84 BPM | SYSTOLIC BLOOD PRESSURE: 140 MMHG | HEIGHT: 74 IN | WEIGHT: 244 LBS | DIASTOLIC BLOOD PRESSURE: 96 MMHG

## 2021-05-15 VITALS
HEIGHT: 74 IN | HEART RATE: 97 BPM | DIASTOLIC BLOOD PRESSURE: 102 MMHG | SYSTOLIC BLOOD PRESSURE: 148 MMHG | WEIGHT: 240 LBS | BODY MASS INDEX: 30.8 KG/M2

## 2021-05-15 VITALS
SYSTOLIC BLOOD PRESSURE: 128 MMHG | TEMPERATURE: 97.1 F | HEART RATE: 64 BPM | DIASTOLIC BLOOD PRESSURE: 82 MMHG | BODY MASS INDEX: 30.19 KG/M2 | WEIGHT: 235.25 LBS | HEIGHT: 74 IN | OXYGEN SATURATION: 95 %

## 2021-05-15 VITALS — WEIGHT: 234 LBS | HEIGHT: 74 IN | OXYGEN SATURATION: 97 % | HEART RATE: 86 BPM | BODY MASS INDEX: 30.03 KG/M2

## 2021-05-15 VITALS
BODY MASS INDEX: 30.59 KG/M2 | SYSTOLIC BLOOD PRESSURE: 134 MMHG | WEIGHT: 238.37 LBS | DIASTOLIC BLOOD PRESSURE: 82 MMHG | TEMPERATURE: 98 F | HEIGHT: 74 IN | OXYGEN SATURATION: 97 % | HEART RATE: 55 BPM

## 2021-05-15 VITALS
HEART RATE: 88 BPM | TEMPERATURE: 98.3 F | SYSTOLIC BLOOD PRESSURE: 138 MMHG | OXYGEN SATURATION: 97 % | DIASTOLIC BLOOD PRESSURE: 88 MMHG | HEIGHT: 74 IN | BODY MASS INDEX: 31.38 KG/M2 | WEIGHT: 244.5 LBS

## 2021-05-15 VITALS
HEART RATE: 90 BPM | DIASTOLIC BLOOD PRESSURE: 80 MMHG | TEMPERATURE: 97.7 F | OXYGEN SATURATION: 97 % | BODY MASS INDEX: 30.93 KG/M2 | HEIGHT: 74 IN | WEIGHT: 241 LBS | SYSTOLIC BLOOD PRESSURE: 132 MMHG

## 2021-05-15 VITALS
DIASTOLIC BLOOD PRESSURE: 88 MMHG | HEIGHT: 74 IN | TEMPERATURE: 97.6 F | WEIGHT: 247.5 LBS | HEART RATE: 72 BPM | BODY MASS INDEX: 31.76 KG/M2 | OXYGEN SATURATION: 94 % | SYSTOLIC BLOOD PRESSURE: 138 MMHG

## 2021-05-15 VITALS — WEIGHT: 231 LBS | HEIGHT: 72 IN | RESPIRATION RATE: 16 BRPM | BODY MASS INDEX: 31.29 KG/M2

## 2021-05-15 VITALS
BODY MASS INDEX: 30.99 KG/M2 | HEIGHT: 74 IN | DIASTOLIC BLOOD PRESSURE: 110 MMHG | SYSTOLIC BLOOD PRESSURE: 140 MMHG | WEIGHT: 241.5 LBS

## 2021-05-15 VITALS — OXYGEN SATURATION: 97 % | HEIGHT: 74 IN | WEIGHT: 235 LBS | HEART RATE: 54 BPM | BODY MASS INDEX: 30.16 KG/M2

## 2021-05-15 VITALS
RESPIRATION RATE: 16 BRPM | SYSTOLIC BLOOD PRESSURE: 110 MMHG | HEIGHT: 74 IN | BODY MASS INDEX: 31.32 KG/M2 | OXYGEN SATURATION: 96 % | DIASTOLIC BLOOD PRESSURE: 72 MMHG | WEIGHT: 244 LBS | HEART RATE: 77 BPM

## 2021-05-15 VITALS — BODY MASS INDEX: 30.93 KG/M2 | WEIGHT: 241 LBS | OXYGEN SATURATION: 97 % | HEART RATE: 57 BPM | HEIGHT: 74 IN

## 2021-05-16 VITALS
SYSTOLIC BLOOD PRESSURE: 138 MMHG | HEIGHT: 73 IN | DIASTOLIC BLOOD PRESSURE: 74 MMHG | WEIGHT: 233 LBS | BODY MASS INDEX: 30.88 KG/M2

## 2021-05-16 VITALS — BODY MASS INDEX: 30.48 KG/M2 | HEART RATE: 81 BPM | OXYGEN SATURATION: 98 % | WEIGHT: 230 LBS | HEIGHT: 73 IN

## 2021-05-16 VITALS
WEIGHT: 238.12 LBS | DIASTOLIC BLOOD PRESSURE: 84 MMHG | BODY MASS INDEX: 31.56 KG/M2 | SYSTOLIC BLOOD PRESSURE: 138 MMHG | HEIGHT: 73 IN

## 2021-05-16 VITALS
HEART RATE: 74 BPM | TEMPERATURE: 98.3 F | DIASTOLIC BLOOD PRESSURE: 82 MMHG | OXYGEN SATURATION: 98 % | BODY MASS INDEX: 30.42 KG/M2 | SYSTOLIC BLOOD PRESSURE: 152 MMHG | HEIGHT: 74 IN | WEIGHT: 237 LBS

## 2021-05-16 VITALS — HEART RATE: 71 BPM | WEIGHT: 232 LBS | HEIGHT: 73 IN | BODY MASS INDEX: 30.75 KG/M2 | OXYGEN SATURATION: 98 %

## 2021-05-16 VITALS — RESPIRATION RATE: 18 BRPM | HEIGHT: 73 IN | BODY MASS INDEX: 30.76 KG/M2 | WEIGHT: 232.12 LBS

## 2021-05-16 VITALS — BODY MASS INDEX: 30.75 KG/M2 | OXYGEN SATURATION: 98 % | WEIGHT: 232 LBS | HEIGHT: 73 IN | HEART RATE: 73 BPM

## 2021-05-16 VITALS
SYSTOLIC BLOOD PRESSURE: 166 MMHG | BODY MASS INDEX: 28.23 KG/M2 | HEART RATE: 78 BPM | DIASTOLIC BLOOD PRESSURE: 92 MMHG | WEIGHT: 220 LBS | HEIGHT: 74 IN

## 2021-05-16 VITALS
OXYGEN SATURATION: 98 % | SYSTOLIC BLOOD PRESSURE: 128 MMHG | DIASTOLIC BLOOD PRESSURE: 80 MMHG | BODY MASS INDEX: 30.03 KG/M2 | HEIGHT: 74 IN | WEIGHT: 234 LBS | HEART RATE: 74 BPM

## 2021-05-16 VITALS
WEIGHT: 220 LBS | BODY MASS INDEX: 29.16 KG/M2 | DIASTOLIC BLOOD PRESSURE: 86 MMHG | HEIGHT: 73 IN | SYSTOLIC BLOOD PRESSURE: 142 MMHG

## 2021-05-16 VITALS
HEART RATE: 72 BPM | BODY MASS INDEX: 29.65 KG/M2 | WEIGHT: 231 LBS | SYSTOLIC BLOOD PRESSURE: 138 MMHG | HEIGHT: 74 IN | DIASTOLIC BLOOD PRESSURE: 92 MMHG

## 2021-05-16 VITALS — RESPIRATION RATE: 14 BRPM | WEIGHT: 232 LBS | BODY MASS INDEX: 30.75 KG/M2 | HEIGHT: 73 IN

## 2021-05-16 VITALS — HEART RATE: 72 BPM | WEIGHT: 238 LBS | OXYGEN SATURATION: 98 % | BODY MASS INDEX: 31.54 KG/M2 | HEIGHT: 73 IN

## 2021-05-16 VITALS — RESPIRATION RATE: 16 BRPM | WEIGHT: 234 LBS | HEIGHT: 74 IN | BODY MASS INDEX: 30.03 KG/M2

## 2021-06-03 RX ORDER — CYANOCOBALAMIN (VITAMIN B-12) 500 MCG
1 LOZENGE ORAL DAILY
COMMUNITY
End: 2022-07-12

## 2021-06-03 RX ORDER — UBIDECARENONE 200 MG
1 CAPSULE ORAL DAILY
COMMUNITY
End: 2022-07-12

## 2021-06-03 RX ORDER — LISINOPRIL 40 MG/1
1 TABLET ORAL DAILY
COMMUNITY
End: 2021-10-25

## 2021-06-03 RX ORDER — CHLORAL HYDRATE 500 MG
1 CAPSULE ORAL
COMMUNITY
End: 2022-07-12

## 2021-06-03 RX ORDER — TESTOSTERONE CYPIONATE 200 MG/ML
100 INJECTION, SOLUTION INTRAMUSCULAR
COMMUNITY
End: 2022-07-12

## 2021-06-03 RX ORDER — OXYBUTYNIN CHLORIDE 5 MG/1
1 TABLET ORAL DAILY
COMMUNITY
End: 2022-01-11 | Stop reason: ALTCHOICE

## 2021-06-09 ENCOUNTER — OFFICE VISIT (OUTPATIENT)
Dept: FAMILY MEDICINE CLINIC | Facility: CLINIC | Age: 66
End: 2021-06-09

## 2021-06-09 VITALS
HEIGHT: 74 IN | SYSTOLIC BLOOD PRESSURE: 140 MMHG | HEART RATE: 85 BPM | TEMPERATURE: 97.8 F | OXYGEN SATURATION: 97 % | BODY MASS INDEX: 28.85 KG/M2 | DIASTOLIC BLOOD PRESSURE: 78 MMHG | WEIGHT: 224.8 LBS

## 2021-06-09 DIAGNOSIS — E34.9 TESTOSTERONE DEFICIENCY: ICD-10-CM

## 2021-06-09 DIAGNOSIS — I10 ESSENTIAL HYPERTENSION: ICD-10-CM

## 2021-06-09 DIAGNOSIS — I71.20 THORACIC AORTIC ANEURYSM, WITHOUT RUPTURE: ICD-10-CM

## 2021-06-09 DIAGNOSIS — L30.9 ECZEMA, UNSPECIFIED TYPE: ICD-10-CM

## 2021-06-09 DIAGNOSIS — Z00.00 MEDICARE ANNUAL WELLNESS VISIT, SUBSEQUENT: Primary | ICD-10-CM

## 2021-06-09 DIAGNOSIS — R29.898 LEFT HAND WEAKNESS: ICD-10-CM

## 2021-06-09 DIAGNOSIS — Z91.81 AT MODERATE RISK FOR FALL: ICD-10-CM

## 2021-06-09 PROCEDURE — 1159F MED LIST DOCD IN RCRD: CPT | Performed by: NURSE PRACTITIONER

## 2021-06-09 PROCEDURE — 99213 OFFICE O/P EST LOW 20 MIN: CPT | Performed by: NURSE PRACTITIONER

## 2021-06-09 PROCEDURE — 1170F FXNL STATUS ASSESSED: CPT | Performed by: NURSE PRACTITIONER

## 2021-06-09 PROCEDURE — G0439 PPPS, SUBSEQ VISIT: HCPCS | Performed by: NURSE PRACTITIONER

## 2021-06-09 RX ORDER — TRIAMCINOLONE ACETONIDE 1 MG/G
CREAM TOPICAL 2 TIMES DAILY
Qty: 45 G | Refills: 2 | Status: SHIPPED | OUTPATIENT
Start: 2021-06-09 | End: 2022-03-31

## 2021-06-09 NOTE — PROGRESS NOTES
The ABCs of the Annual Wellness Visit  Subsequent Medicare Wellness Visit    Chief Complaint   Patient presents with   • Medicare Wellness-subsequent   Left hand weakness and eczema    Subjective   History of Present Illness:  Poncho Hernandez is a 65 y.o. male who presents for a Subsequent Medicare Wellness Visit.  HPI  Presents today for Medicare annual wellness and left hand weakness and eczema.  He has a history of eczema.  He is used triamcinolone acetonide cream.  He reports he has eczema currently on his left upper extremity bilateral lower extremities near the ankles.  Also has eczema on his abdomen.    History of left hand weakness.  He has a good grasp as difficulty extension of his fingers of his left hand.  Also has atrophy in his left forearm.    HEALTH RISK ASSESSMENT    Recent Hospitalizations:  No hospitalization(s) within the last year.    Current Medical Providers:  Patient Care Team:  Anthony Mendez APRN as PCP - General (Nurse Practitioner)    Smoking Status:  Social History     Tobacco Use   Smoking Status Never Smoker   Smokeless Tobacco Never Used       Alcohol Consumption:  Social History     Substance and Sexual Activity   Alcohol Use Never       Depression Screen:   PHQ-2/PHQ-9 Depression Screening 6/9/2021   Little interest or pleasure in doing things 0   Feeling down, depressed, or hopeless 0   Total Score 0       Fall Risk Screen:  STEADI Fall Risk Assessment was completed, and patient is at MODERATE risk for falls. Assessment completed on:6/9/2021    Health Habits and Functional and Cognitive Screening:  Functional & Cognitive Status 6/9/2021   Do you have difficulty preparing food and eating? No   Do you have difficulty bathing yourself, getting dressed or grooming yourself? No   Do you have difficulty using the toilet? No   Do you have difficulty moving around from place to place? No   Do you have trouble with steps or getting out of a bed or a chair? No   Current Diet Well  Balanced Diet   Dental Exam Up to date   Eye Exam Up to date   Exercise (times per week) 6 times per week   Current Exercise Activities Include Walking   Do you need help using the phone?  No   Are you deaf or do you have serious difficulty hearing?  No   Do you need help with transportation? No   Do you need help shopping? No   Do you need help preparing meals?  No   Do you need help with housework?  No   Do you need help with laundry? No   Do you need help taking your medications? No   Do you need help managing money? No   Do you ever drive or ride in a car without wearing a seat belt? No         Does the patient have evidence of cognitive impairment? No    Asprin use counseling:Does not need ASA (and currently is not on it)    Age-appropriate Screening Schedule:  Refer to the list below for future screening recommendations based on patient's age, sex and/or medical conditions. Orders for these recommended tests are listed in the plan section. The patient has been provided with a written plan.    Health Maintenance   Topic Date Due   • ZOSTER VACCINE (2 of 2) 01/15/2014   • INFLUENZA VACCINE  08/01/2021   • TDAP/TD VACCINES (3 - Td or Tdap) 03/08/2028            Outpatient Medications Prior to Visit   Medication Sig Dispense Refill   • lisinopril (PRINIVIL,ZESTRIL) 40 MG tablet Take 1 tablet by mouth Daily.     • Testosterone Cypionate (DEPOTESTOTERONE CYPIONATE) 200 MG/ML injection Inject 1 mL into the appropriate muscle as directed by prescriber Every 14 (Fourteen) Days.     • calcium carbonate-cholecalciferol 500-400 MG-UNIT tablet tablet Take 2 tablets by mouth Daily.     • Coenzyme Q10 200 MG capsule Take 1 capsule by mouth Daily.     • Garlic 100 MG tablet Take 1 tablet by mouth Daily.     • magnesium oxide (MAGOX) 400 (241.3 Mg) MG tablet tablet Take 2 tablets by mouth Daily.     • Omega-3 Fatty Acids (fish oil) 1000 MG capsule capsule Take 1 capsule by mouth Daily With Breakfast.     • oxybutynin  "(DITROPAN) 5 MG tablet Take 1 tablet by mouth Daily.     • Vitamin E 400 units tablet Take 1 tablet by mouth Daily.       No facility-administered medications prior to visit.       Patient Active Problem List   Diagnosis   • Left hand weakness   • Eczema   • Thoracic aortic aneurysm, without rupture (CMS/[facility])   • Testosterone deficiency   • High blood pressure       Advanced Care Planning:  ACP discussion was declined by the patient. Patient does not have an advance directive, declines further assistance.    Review of Systems    Compared to one year ago, the patient feels his physical health is better.  Compared to one year ago, the patient feels his mental health is better.    Reviewed chart for potential of high risk medication in the elderly: yes  Reviewed chart for potential of harmful drug interactions in the elderly:yes    Objective         Vitals:    06/09/21 0944   BP: 140/78   Pulse: 85   Temp: 97.8 °F (36.6 °C)   SpO2: 97%   Weight: 102 kg (224 lb 12.8 oz)   Height: 188 cm (74.02\")       Body mass index is 28.85 kg/m².  Discussed the patient's BMI with him. The BMI is in the acceptable range.    Physical Exam  Vitals reviewed.   HENT:      Head: Normocephalic.   Eyes:      Pupils: Pupils are equal, round, and reactive to light.   Cardiovascular:      Rate and Rhythm: Normal rate. Rhythm irregular.      Pulses: Normal pulses.   Pulmonary:      Effort: Pulmonary effort is normal.      Breath sounds: Normal breath sounds.   Musculoskeletal:      Cervical back: Normal range of motion.   Skin:     General: Skin is warm and dry.      Capillary Refill: Capillary refill takes less than 2 seconds.      Findings: Erythema and rash present.   Neurological:      Mental Status: He is alert and oriented to person, place, and time.               Assessment/Plan   Medicare Risks and Personalized Health Plan  CMS Preventative Services Quick Reference  Abdominal Aortic Aneurysm Screening  Fall Risk  Prostate Cancer " Screening     The above risks/problems have been discussed with the patient.  Pertinent information has been shared with the patient in the After Visit Summary.  Follow up plans and orders are seen below in the Assessment/Plan Section.    Diagnoses and all orders for this visit:    1. Medicare annual wellness visit, subsequent (Primary)    2. Left hand weakness  Assessment & Plan:  Consult Occupational Therapy    Orders:  -     Ambulatory Referral to Occupational Therapy    3. Eczema, unspecified type  Assessment & Plan:  Triamcinolone cream applied twice daily as needed    Orders:  -     triamcinolone (KENALOG) 0.1 % cream; Apply  topically to the appropriate area as directed 2 (Two) Times a Day.  Dispense: 45 g; Refill: 2    4. Thoracic aortic aneurysm, without rupture (CMS/[facility])  Assessment & Plan:  He will be seeing Dr. Miranda next week for follow-up on aortic aneurysm.      5. Essential hypertension  Assessment & Plan:  Blood pressures well controlled.  Continue current regimen      6. Testosterone deficiency  Assessment & Plan:  He is receiving testosterone injections.  He is followed by endocrinologist Dr. Kelley      Follow Up:  No follow-ups on file.     An After Visit Summary and PPPS were given to the patient.

## 2021-06-09 NOTE — PATIENT INSTRUCTIONS

## 2021-06-29 ENCOUNTER — TELEPHONE (OUTPATIENT)
Dept: FAMILY MEDICINE CLINIC | Facility: CLINIC | Age: 66
End: 2021-06-29

## 2021-06-29 NOTE — TELEPHONE ENCOUNTER
Caller: Poncho Hernandez    Relationship: Self    Best call back number: [phone]    What is the best time to reach you: ANY    Who are you requesting to speak with (clinical staff, provider,  specific staff member): CLINICAL STAFF    What was the call regarding: PATIENT HAS BEEN EXPERIENCING LEFT EYE IRRITATION AND HAS A RASH ON HIS KNEES. HE WOULD LIKE TO SPEAK WITH A NURSE ABOUT WHAT HE SHOULD DO.    Do you require a callback: YES

## 2021-06-30 ENCOUNTER — TREATMENT (OUTPATIENT)
Dept: PHYSICAL THERAPY | Facility: CLINIC | Age: 66
End: 2021-06-30

## 2021-06-30 DIAGNOSIS — R29.898 LEFT HAND WEAKNESS: Primary | ICD-10-CM

## 2021-06-30 PROCEDURE — 97110 THERAPEUTIC EXERCISES: CPT | Performed by: OCCUPATIONAL THERAPIST

## 2021-06-30 PROCEDURE — 97166 OT EVAL MOD COMPLEX 45 MIN: CPT | Performed by: OCCUPATIONAL THERAPIST

## 2021-06-30 RX ORDER — ERYTHROMYCIN 5 MG/G
OINTMENT OPHTHALMIC NIGHTLY
Qty: 1 G | Refills: 0 | Status: SHIPPED | OUTPATIENT
Start: 2021-06-30 | End: 2021-07-07

## 2021-06-30 RX ORDER — HYDROQUINONE 40 MG/G
CREAM TOPICAL 2 TIMES DAILY
Qty: 28.35 G | Refills: 0 | Status: SHIPPED | OUTPATIENT
Start: 2021-06-30 | End: 2022-01-11

## 2021-06-30 NOTE — PROGRESS NOTES
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  Pt has been focusing on digital extension for last year and wants to see progress and get new exercises for increased function.      Patient Occupation: retired doctor Quality of life: excellent    Pain  Current pain ratin    Social Support  Lives in: multiple-level home  Lives with: spouse    Hand dominance: left    Patient Goals  Patient goals for therapy: independence with ADLs/IADLs and increased strength           Past Medical hx:    Objective          Active Range of Motion     Right Wrist   Wrist extension: 65 degrees     Additional Active Range of Motion Details  70-90 30-90 0-60  75-90 20-90 0-60  45-90 15-80 15-60  25-90 0-90 0-60    Strength/Myotome Testing     Left Wrist/Hand      (2nd hand position)   lbs: 100    Right Wrist/Hand      (2nd hand position)   lbs: 110          Assessment & Plan     Assessment  Prognosis: good  Functional Limitations: carrying objects, lifting, pulling and pushing  Goals  Plan Goals: 1. . The patient has limited ROM of the digital extension  At MPs                  LTG 1: 12 weeks:  The patient will demonstrate less thank 15 degrees extension lag at MPs to allow the patient to grasp.                                  STATUS:  New                   STG 1a: 4 weeks:  The patient will demonstrate less than 25 degrees of extension lag at MP.                                  STATUS:  New                               2. Carrying, Moving, and Handling Objects Functional  Limitation                                   LTG 2: 12 weeks:  The patient will demonstrate 1-19% limitation by achieving a score of 15 on the Quick DASH.                                  STATUS:  New                  STG 2a: 4 weeks:  The patient will demonstrate 1-19% limitation by achieving a score of 18 on the Quick DASH.                                    STATUS:  New                    TREATMENT: Orthotic fabrication/fitting/management and training, Manual therapy, therapeutic exercise, home exercise instruction, and modalities as needed to include: electrical stimulation, ultrasound, moist heat, paraffin, fluidotherapy and ice.      Plan  Planned modality interventions: electrical stimulation/Russian stimulation  Planned therapy interventions: manual therapy, neuromuscular re-education, orthotic fitting/training, flexibility, functional ROM exercises, fine motor coordination training and strengthening  Other planned therapy interventions: constraint induced therapy  Frequency: 1x month  Duration in weeks: 12  Treatment plan discussed with: patient        Patient is indicated for skilled occupational therapy services.    History # of Personal Factors and/or Comorbidities: MODERATE (1-2)  Examination of Body System(s): # of elements: LOW (1-2)  Clinical Presentation: EVOLVING  Clinical Decision Making: MODERATE     Evaluation:  Low Complexity:    0     mins  59040;  Mod Complexity:    30     mins  72932;  High Complexity:    0     mins  99530;    Timed:  Manual Therapy:    0     mins  11728;  Therapeutic Exercise:    30     mins  05489;     Neuromuscular Freda:    0    mins  91634;    Therapeutic Activity:     0     mins  37834;     Ultrasound:     0     mins  71023;    Electrical Stimulation:    0     mins  68072 ( );    Untimed:  Electrical Stimulation:    0     mins  12155 ( );  Fluidotherapy:  __0_ mins  59692    Timed Treatment:   30   mins   Total Treatment:     60   mins      OT SIGNATURE:  Rebeka Barth, GRAZYNA, OTR/L, CHT   DATE TREATMENT INITIATED: 6/30/2021    Initial Certification  Certification Period: 9/28/2021  I certify that the therapy services are furnished while this patient is under my care.  The services outlined above are required by this patient, and will be reviewed every 90 days.     PHYSICIAN: Anthony Mendez, TON      DATE:     Please sign and return via fax to [phone]   Thank you, [facility] Occupational Therapy.

## 2021-08-01 PROBLEM — I48.0 PAROXYSMAL A-FIB (HCC): Status: ACTIVE | Noted: 2021-08-01

## 2021-08-04 ENCOUNTER — DOCUMENTATION (OUTPATIENT)
Dept: PHYSICAL THERAPY | Facility: CLINIC | Age: 66
End: 2021-08-04

## 2021-08-04 DIAGNOSIS — R29.898 LEFT HAND WEAKNESS: Primary | ICD-10-CM

## 2021-08-04 NOTE — PROGRESS NOTES
Discharge Summary  Discharge Summary from Occupational Therapy Report    Patient Information  Poncho Hernandez  1955    Dates OT visit: 6/30/21  Number of Visits: 1     Discharge Status of Patient: See MD Note dated 6/30/21    Goals: Not Met    Visit Diagnoses:    ICD-10-CM ICD-9-CM   1. Left hand weakness  R29.898 728.87       Discharge Plan: d/c from skilled OT to HEP    Comments Pt did not return for follow up    Date of Discharge 8/4/21        Rebeka Barth, GRAZYNA, OTR/L, CHT  Occupational Therapist, Certified Hand therapist

## 2021-08-10 ENCOUNTER — TELEPHONE (OUTPATIENT)
Dept: UROLOGY | Facility: CLINIC | Age: 66
End: 2021-08-10

## 2021-08-10 NOTE — TELEPHONE ENCOUNTER
Spoke to patient-he is feeling better from COVID, but still having some symptoms. I rescheduled his appointment to 8/27/21. IF he is not feeling better, he will contact the office to reschedule again.

## 2021-08-10 NOTE — TELEPHONE ENCOUNTER
Patient has appointment for PSA on 08/16, and it needs to be rescheduled to 08/18, or later.  It was a work-in appointment.       Patient tested positive for Covid on 08/08.  Minor symptoms.  Told to quarantine for 10 days from onset of symptoms.  He stated that would be on 08/16, but his wife is insists that he wait until at least 08/18. [facility] Moderna [facility] [place].

## 2021-08-24 ENCOUNTER — TELEPHONE (OUTPATIENT)
Dept: FAMILY MEDICINE CLINIC | Facility: CLINIC | Age: 66
End: 2021-08-24

## 2021-08-24 NOTE — TELEPHONE ENCOUNTER
Caller: Poncho Hernandez    Relationship: Self    Best call back number: [phone]    What is the best time to reach you: ANYTIME    Who are you requesting to speak with (clinical staff, provider,  specific staff member): TERE ALEXANDER    Do you know the name of the person who called:     What was the call regarding: PATIENT STATES THAT HE NEEDS HIS REFERRAL RENEWED FOR THE OCCCUPATIONAL  THERAPY    Do you require a callback: NO

## 2021-08-25 DIAGNOSIS — R29.898 LEFT HAND WEAKNESS: Primary | ICD-10-CM

## 2021-08-27 ENCOUNTER — OFFICE VISIT (OUTPATIENT)
Dept: UROLOGY | Facility: CLINIC | Age: 66
End: 2021-08-27

## 2021-08-27 VITALS
DIASTOLIC BLOOD PRESSURE: 80 MMHG | SYSTOLIC BLOOD PRESSURE: 154 MMHG | HEIGHT: 74 IN | WEIGHT: 220 LBS | BODY MASS INDEX: 28.23 KG/M2

## 2021-08-27 DIAGNOSIS — R97.20 ELEVATED PROSTATE SPECIFIC ANTIGEN (PSA): Primary | ICD-10-CM

## 2021-08-27 DIAGNOSIS — N40.1 BPH WITH OBSTRUCTION/LOWER URINARY TRACT SYMPTOMS: ICD-10-CM

## 2021-08-27 DIAGNOSIS — R35.1 NOCTURIA: ICD-10-CM

## 2021-08-27 DIAGNOSIS — N13.8 BPH WITH OBSTRUCTION/LOWER URINARY TRACT SYMPTOMS: ICD-10-CM

## 2021-08-27 LAB
BILIRUB BLD-MCNC: NEGATIVE MG/DL
CLARITY, POC: CLEAR
COLOR UR: YELLOW
GLUCOSE UR STRIP-MCNC: NEGATIVE MG/DL
KETONES UR QL: NEGATIVE
LEUKOCYTE EST, POC: NEGATIVE
NITRITE UR-MCNC: NEGATIVE MG/ML
PH UR: 5.5 [PH] (ref 5–8)
PROT UR STRIP-MCNC: NEGATIVE MG/DL
RBC # UR STRIP: NEGATIVE /UL
SP GR UR: 1.02 (ref 1–1.03)
SPECIMEN VOL 24H UR: 0 L
UROBILINOGEN UR QL: NORMAL

## 2021-08-27 PROCEDURE — 51798 US URINE CAPACITY MEASURE: CPT | Performed by: UROLOGY

## 2021-08-27 PROCEDURE — 99214 OFFICE O/P EST MOD 30 MIN: CPT | Performed by: UROLOGY

## 2021-08-27 PROCEDURE — 81003 URINALYSIS AUTO W/O SCOPE: CPT | Performed by: UROLOGY

## 2021-08-27 RX ORDER — OMEGA-3/DHA/EPA/FISH OIL 300-1000MG
CAPSULE ORAL
COMMUNITY
End: 2022-01-11 | Stop reason: SDUPTHER

## 2021-08-27 RX ORDER — SYRINGE WITH NEEDLE, 1 ML 25GX5/8"
SYRINGE, EMPTY DISPOSABLE MISCELLANEOUS
COMMUNITY
Start: 2021-07-06 | End: 2022-07-12

## 2021-08-27 NOTE — PROGRESS NOTES
"Chief Complaint  Elevated PSA    Subjective          Poncho Hernandez presents to [facility]  His most recent PSA is 6.10 in August 2021.      PSA in October 2019 was 5.59.  This is actually down from his most recent PSA prior which was 6.68.  His PSA has been as high as 9.  He had a negative prostate biopsy in 2011.  It was 5.9 in August 2020.     He had a button TURP in May 2018.  His PVR today is 0 cc.  He is emptying much better.  Less frequency and urgency, especially during the day.  Nocturia a few times a night, some nights are worse than other.  He states his quality of life is greatly improved but he does get frustrated on the nights he has to get up more.  It has gotten a little worse over the past few months and he is having more nocturia with urgency.  He states his stream is still really good despite the urgency and nocturia.      He was recently diagnosed with low T and has started on depo-testosterone about a year ago.      Past uro history   -----------------------------    He has had an up and down elevated PSA for years with a negative prostate biopsy in 2011.  At that time his PSA was 5.7.  It had been as high as 6.98 in 2014.  It was 3.84 in Dec 2018 and then 9.10 in Feb 2019 in a repeat check.  It was 6.68 in April 2019 after Bactrim for 4 weeks.  He also had another round of Cipro for a UTI.      He had a button TURP that was unremarkable in 2018.    Pelvic ultrasound in 2017 revealed a 5 x 6 x 4 cm prostate.       Objective   Vital Signs:   /80 (BP Location: Right arm, Patient Position: Sitting, Cuff Size: Adult)   Ht 188 cm (74\")   Wt 99.8 kg (220 lb)   BMI 28.25 kg/m²     Physical Exam  Vitals and nursing note reviewed.   Constitutional:       Appearance: Normal appearance. He is well-developed.   Pulmonary:      Effort: Pulmonary effort is normal.      Breath sounds: Normal air entry.   Neurological:      Mental Status: He is alert and oriented to " person, place, and time.      Motor: Motor function is intact.   Psychiatric:         Mood and Affect: Mood normal.         Behavior: Behavior normal.        Result Review :                 Assessment and Plan    Diagnoses and all orders for this visit:    1. Elevated prostate specific antigen (PSA) (Primary)  Assessment & Plan:  His PSA is elevated but stable.  He is on a negative prostate biopsy in the past.  No further work-up needed at this time.  I have no problem with him being on testosterone replacement.  We will continue to check yearly.    Orders:  -     POC Urinalysis Dipstick, Automated  -     Bladder Scan  -     PSA DIAGNOSTIC; Future    2. Nocturia  Assessment & Plan:  No further treatment needed at this time.  We will continue to monitor.    Orders:  -     Bladder Scan    3. BPH with obstruction/lower urinary tract symptoms  Assessment & Plan:  We discussed options for treatment of his BPH if his symptoms worsen.  This would include a repeat TURP versus a robotic suprapubic prostatectomy.  At this point he is emptying well with a PVR of 0 cc and his symptoms are mild with only 1 or 2 nights a week where he is getting up more than once.  During the day he is doing fine.  We will continue to monitor and follow-up in 1 year.    Orders:  -     Bladder Scan  -     PSA DIAGNOSTIC; Future    I spent 30 minutes caring for Poncho on this date of service. This time includes time spent by me in the following activities:preparing for the visit, reviewing tests, obtaining and/or reviewing a separately obtained history, ordering medications, tests, or procedures and documenting information in the medical record     Follow Up   Return in about 1 year (around 8/27/2022) for PSA prior to visit, PVR check, Next scheduled follow up.  Patient was given instructions and counseling regarding his condition or for health maintenance advice. Please see specific information pulled into the AVS if appropriate.

## 2021-08-27 NOTE — ASSESSMENT & PLAN NOTE
His PSA is elevated but stable.  He is on a negative prostate biopsy in the past.  No further work-up needed at this time.  I have no problem with him being on testosterone replacement.  We will continue to check yearly.

## 2021-08-27 NOTE — ASSESSMENT & PLAN NOTE
We discussed options for treatment of his BPH if his symptoms worsen.  This would include a repeat TURP versus a robotic suprapubic prostatectomy.  At this point he is emptying well with a PVR of 0 cc and his symptoms are mild with only 1 or 2 nights a week where he is getting up more than once.  During the day he is doing fine.  We will continue to monitor and follow-up in 1 year.

## 2021-09-14 ENCOUNTER — TREATMENT (OUTPATIENT)
Dept: PHYSICAL THERAPY | Facility: CLINIC | Age: 66
End: 2021-09-14

## 2021-09-14 DIAGNOSIS — R29.898 LEFT HAND WEAKNESS: Primary | ICD-10-CM

## 2021-09-14 PROCEDURE — 97165 OT EVAL LOW COMPLEX 30 MIN: CPT | Performed by: OCCUPATIONAL THERAPIST

## 2021-09-14 PROCEDURE — 97110 THERAPEUTIC EXERCISES: CPT | Performed by: OCCUPATIONAL THERAPIST

## 2021-09-14 NOTE — PROGRESS NOTES
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  Pt has been focusing on digital extension for last year and wants to see progress and get new exercises for increased function.       Patient Occupation: retired doctor Quality of life: excellent    Pain  Current pain ratin     Social Support  Lives in: multiple-level home  Lives with: spouse     Hand dominance: left    Patient Goals  Patient goals for therapy: independence with ADLs/IADLs and increased strength              Past Medical hx:     Objective            Active Range of Motion      Right Wrist   Wrist extension: 75 degrees     Additional Active Range of Motion Details  70-90   10-90   0-60  75-90   0-90   0-60  60-90   0-80   0-60  60-90   0-90     0-60     Strength/Myotome Testing      Left Wrist/Hand       (2nd hand position)   lbs: 100     Right Wrist/Hand       (2nd hand position)   lbs: 105            Assessment & Plan      Assessment  Prognosis: good  Functional Limitations: carrying objects, lifting, pulling and pushing  Goals  Plan Goals: 1. . The patient has limited ROM of the digital extension  At MPs                  LTG 1: 12 weeks:  The patient will demonstrate less than 15 degrees extension lag at MPs to allow the patient to grasp.                                  STATUS:  New                   STG 1a: 4 weeks:  The patient will demonstrate less than 25 degrees of extension lag at MP.                                  STATUS:  New                               2. Carrying, Moving,  and Handling Objects Functional Limitation                                   LTG 2: 12 weeks:  The patient will demonstrate 1-19% limitation by achieving a score of 15 on the Quick DASH.                                  STATUS:  New                  STG 2a: 4 weeks:  The patient will demonstrate 1-19% limitation by achieving a score of 18 on the Quick DASH.                                    STATUS:  New                    TREATMENT: Orthotic fabrication/fitting/management and training, Manual therapy, therapeutic exercise, home exercise instruction, and modalities as needed to include: electrical stimulation, ultrasound, moist heat, paraffin, fluidotherapy and ice.     Plan  Planned modality interventions: electrical stimulation/Russian stimulation  Planned therapy interventions: manual therapy, neuromuscular re-education, orthotic fitting/training, flexibility, functional ROM exercises, fine motor coordination training and strengthening  Other planned therapy interventions: constraint induced therapy  Frequency: 1x every 3 months  Duration in weeks: 12  Treatment plan discussed with: patient         Patient is indicated for skilled occupational therapy services.     History # of Personal Factors and/or Comorbidities: MODERATE (1-2)  Examination of Body System(s): # of elements: LOW (1-2)  Clinical Presentation: EVOLVING  Clinical Decision Making: MODERATE     Evaluation:  Low Complexity:    15     mins  99288;  Mod Complexity:    0     mins  54360;  High Complexity:    0     mins  60747;    Timed:  Manual Therapy:    0     mins  78056;  Therapeutic Exercise:    30     mins  61807;     Neuromuscular Freda:    0    mins  50498;    Therapeutic Activity:     0     mins  46620;     Ultrasound:     0     mins  66519;    Electrical Stimulation:    0     mins  13820;    Untimed:  Electrical Stimulation:    0     mins  74007 ( );  Fluidotherapy:  __0_ mins  10876    Timed Treatment:   30   mins   Total Treatment:      45   mins      OT SIGNATURE: GRAZYNA Foster, OTR/L, CHT   DATE TREATMENT INITIATED: 9/14/2021    Initial Certification  Certification Period: 12/13/2021  I certify that the therapy services are furnished while this patient is under my care.  The services outlined above are required by this patient, and will be reviewed every 90 days.     PHYSICIAN: Anthony Mendez APRN      DATE:     Please sign and return via fax to [phone]   Thank you, [facility].

## 2021-10-22 ENCOUNTER — APPOINTMENT (OUTPATIENT)
Dept: VACCINE CLINIC | Facility: HOSPITAL | Age: 66
End: 2021-10-22

## 2021-10-25 RX ORDER — LISINOPRIL 40 MG/1
TABLET ORAL
Qty: 90 TABLET | Refills: 1 | Status: SHIPPED | OUTPATIENT
Start: 2021-10-25 | End: 2022-05-13 | Stop reason: SDUPTHER

## 2021-11-08 ENCOUNTER — OFFICE VISIT (OUTPATIENT)
Dept: ORTHOPEDIC SURGERY | Facility: CLINIC | Age: 66
End: 2021-11-08

## 2021-11-08 VITALS — BODY MASS INDEX: 27.35 KG/M2 | WEIGHT: 220 LBS | OXYGEN SATURATION: 98 % | HEART RATE: 72 BPM | HEIGHT: 75 IN

## 2021-11-08 DIAGNOSIS — M72.0 DUPUYTREN'S DISEASE OF PALM OF RIGHT HAND: Primary | ICD-10-CM

## 2021-11-08 PROCEDURE — 99212 OFFICE O/P EST SF 10 MIN: CPT | Performed by: STUDENT IN AN ORGANIZED HEALTH CARE EDUCATION/TRAINING PROGRAM

## 2021-11-08 NOTE — PROGRESS NOTES
"Chief Complaint  Pain of the Right Hand    Subjective          Poncho Hernandez presents to [facility] for   History of Present Illness    The patinet presents here today for evaluation of the right hand. The patient reports a cyst to the palm of the right hand. He seen a dermatologist that told him it was a Dupuytren's nodule.  He reports it has been there for about 6 months. He denies any pain or stiffness. He has no other complaints.     No Known Allergies     Social History     Socioeconomic History   • Marital status:    Tobacco Use   • Smoking status: Never Smoker   • Smokeless tobacco: Never Used   Vaping Use   • Vaping Use: Never used   Substance and Sexual Activity   • Alcohol use: Never   • Drug use: Never        I reviewed the patient's chief complaint, history of present illness, review of systems, past medical history, surgical history, family history, social history, medications, and allergy list.     REVIEW OF SYSTEMS    Constitutional: Denies fevers, chills, weight loss  Cardiovascular: Denies chest pain, shortness of breath  Skin: Denies rashes, acute skin changes  Neurologic: Denies headache, loss of consciousness  MSK: Right hand pain      Objective   Vital Signs:   Pulse 72   Ht 190.5 cm (75\")   Wt 99.8 kg (220 lb)   SpO2 98%   BMI 27.50 kg/m²     Body mass index is 27.5 kg/m².    Physical Exam    Right hand- palpable nodule between 4th and 5th rays.,no cords involved. Full extension of all fingers and flexion. Non-tender. Sensation to light touch median, radial, ulnar nerve. Positive AIN, PIN, ulnar nerve. Positive pulses.     Procedures    Imaging Results (Most Recent)     None                   Assessment and Plan    Diagnoses and all orders for this visit:    1. Dupuytren's disease of palm of right hand (Primary)    Discussed the treatment plan with the patient.  I reviewed home exercises with the patient today to keep the nodule from advancing. Plan " for observance and stretching of the hand.     Call or return if symptoms worsen or patient has any concerns.       Scribed for Arsenio Levin MD by Chloé Suero  11/08/2021   10:01 EST         Follow Up   Return if symptoms worsen or fail to improve.  Patient was given instructions and counseling regarding his condition or for health maintenance advice. Please see specific information pulled into the AVS if appropriate.       I have personally performed the services described in this document as scribed by the above individual and it is both accurate and complete.     Arsenio Levin MD  11/08/21  10:09 EST

## 2021-12-13 ENCOUNTER — OFFICE VISIT (OUTPATIENT)
Dept: SURGERY | Facility: CLINIC | Age: 66
End: 2021-12-13

## 2021-12-13 ENCOUNTER — PREP FOR SURGERY (OUTPATIENT)
Dept: OTHER | Facility: HOSPITAL | Age: 66
End: 2021-12-13

## 2021-12-13 VITALS — HEIGHT: 75 IN | OXYGEN SATURATION: 100 % | HEART RATE: 111 BPM | WEIGHT: 217.4 LBS | BODY MASS INDEX: 27.03 KG/M2

## 2021-12-13 DIAGNOSIS — Z86.010 HISTORY OF COLONIC POLYPS: ICD-10-CM

## 2021-12-13 DIAGNOSIS — Z12.11 SCREENING FOR MALIGNANT NEOPLASM OF COLON: Primary | ICD-10-CM

## 2021-12-13 PROBLEM — Z86.0100 HISTORY OF COLONIC POLYPS: Status: ACTIVE | Noted: 2021-12-13

## 2021-12-13 PROCEDURE — S0260 H&P FOR SURGERY: HCPCS | Performed by: NURSE PRACTITIONER

## 2021-12-13 RX ORDER — POLYETHYLENE GLYCOL 3350 17 G/17G
POWDER, FOR SOLUTION ORAL
Qty: 238 PACKET | Refills: 0 | Status: SHIPPED | OUTPATIENT
Start: 2021-12-13 | End: 2022-01-11 | Stop reason: ALTCHOICE

## 2021-12-13 RX ORDER — ASCORBIC ACID 1000 MG
TABLET ORAL
COMMUNITY
End: 2022-01-11 | Stop reason: ALTCHOICE

## 2021-12-13 RX ORDER — SODIUM CHLORIDE 0.9 % (FLUSH) 0.9 %
3 SYRINGE (ML) INJECTION EVERY 12 HOURS SCHEDULED
Status: CANCELLED | OUTPATIENT
Start: 2021-12-13

## 2021-12-13 RX ORDER — SODIUM CHLORIDE 0.9 % (FLUSH) 0.9 %
10 SYRINGE (ML) INJECTION AS NEEDED
Status: CANCELLED | OUTPATIENT
Start: 2021-12-13

## 2021-12-13 NOTE — PROGRESS NOTES
"Chief Complaint: Colonoscopy (No complaints. HX of polpys)    Subjective      Colonoscopy consult        History of Present Illness  Poncho Hernandez is a 66 y.o. male presents to [facility] for colonoscopy consult.    Patient presents today without complaints for screening colonoscopy.    He denies any abdominal pain, change in bowel habit, rectal bleeding.    Admits to family history with his paternal aunt and uncle of colon cancer.    Admits to history of colonic polyps.    5/16: Colonoscopy ([facility]): Sigmoid - tubular adenoma; Rectum - hyperplastic; diverticulosis.    Objective     Past Medical History:   Diagnosis Date   • Aftercare following bilateral knee joint replacement surgery 04/03/2018   • Arthritis    • Bladder disorder    • BPH (benign prostatic hyperplasia)    • Elevated PSA     MANAGED WITHOUT MEDICATIONS, SITUATIONAL   • Forgetfulness    • H/O hernia repair    • Heart disease    • Heart murmur    • High blood pressure    • Hypertension    • Limb swelling    • Low testosterone in male    • Night sweats    • Paroxysmal A-fib (HCC) 8/1/2021   • Prostate disorder        Past Surgical History:   Procedure Laterality Date   • ADENOIDECTOMY  1958   • COLONOSCOPY     • HAND SURGERY Left 2005   • HERNIA REPAIR     • JOINT REPLACEMENT      ARTIFICIAL JOINTS/LIMBS   • OTHER SURGICAL HISTORY Left     ARM SURGERY   • OTHER SURGICAL HISTORY      METAL IMPLANTS   • PROSTATE BIOPSY     • REPLACEMENT TOTAL KNEE BILATERAL  03/19/2018    BY LEVI   • TURP / TRANSURETHRAL INCISION / DRAINAGE PROSTATE  05/31/2018       Outpatient Medications Marked as Taking for the 12/13/21 encounter (Office Visit) with Jodi Steevns APRN   Medication Sig Dispense Refill   • B-D 3CC LUER-RYAN SYR 22GX1\" 22G X 1\" 3 ML misc USE TO INJECT TESTOSTERONE     • calcium carbonate-cholecalciferol 500-400 MG-UNIT tablet tablet Take 2 tablets by mouth Daily.     • clobetasol (TEMOVATE) 0.05 % cream Apply  " topically to the appropriate area as directed 2 (Two) Times a Day. 30 g 0   • Coenzyme Q10 200 MG capsule Take 1 capsule by mouth Daily.     • fish oil-omega-3 fatty acids 1000 MG capsule Fish Oil 1,000 mg (120 mg-180 mg) oral capsule take 1 capsule by oral route daily   Active     • GARLIC 1500 PO garlic oral take 2 po QD   Suspended     • Grape Seed Extract 30 MG capsule Take  by mouth.     • hydroquinone 4 % cream Apply  topically to the appropriate area as directed 2 (Two) Times a Day. 28.35 g 0   • lisinopril (PRINIVIL,ZESTRIL) 40 MG tablet TAKE 1 TABLET(40 MG) BY MOUTH EVERY DAY 90 tablet 1   • magnesium oxide (MAGOX) 400 (241.3 Mg) MG tablet tablet Take 2 tablets by mouth Daily.     • Pumpkin Seed 500 MG capsule Take  by mouth.     • Testosterone Cypionate (DEPOTESTOTERONE CYPIONATE) 200 MG/ML injection Inject 1 mL into the appropriate muscle as directed by prescriber Every 14 (Fourteen) Days.     • tretinoin (Retin-A) 0.025 % cream Apply  topically to the appropriate area as directed Every Night. 45 g 0   • Vitamin E 400 units tablet Take 1 tablet by mouth Daily.         No Known Allergies     Family History   Problem Relation Age of Onset   • Stroke Mother    • Heart disease Mother    • Hypertension Mother    • Osteoporosis Mother    • Stroke Father    • Heart disease Father    • Cancer Father    • Heart attack Father    • Prostate cancer Father 88   • Osteoporosis Father    • Arthritis Father    • Arthritis Sister    • Arthritis Brother    • Hypertension Maternal Grandmother    • Colon cancer Other    • Colon cancer Other    • Arthritis Son        Social History     Socioeconomic History   • Marital status:    Tobacco Use   • Smoking status: Never Smoker   • Smokeless tobacco: Never Used   Vaping Use   • Vaping Use: Never used   Substance and Sexual Activity   • Alcohol use: Never   • Drug use: Never       Review of Systems   Constitutional: Negative for chills and fever.   Gastrointestinal:  "Negative for abdominal distention, abdominal pain, anal bleeding, blood in stool, constipation, diarrhea and rectal pain.        Vital Signs:   Pulse 111   Ht 190.5 cm (75\")   Wt 98.6 kg (217 lb 6.4 oz)   SpO2 100%   BMI 27.17 kg/m²      Physical Exam  Constitutional:       Appearance: Normal appearance.   HENT:      Head: Normocephalic.   Cardiovascular:      Rate and Rhythm: Normal rate.   Pulmonary:      Effort: Pulmonary effort is normal.   Abdominal:      General: Abdomen is flat.      Palpations: Abdomen is soft.   Skin:     General: Skin is warm and dry.   Neurological:      General: No focal deficit present.      Mental Status: He is alert and oriented to person, place, and time.   Psychiatric:         Mood and Affect: Mood normal.         Thought Content: Thought content normal.          Result Review :              []  Laboratory  []  Radiology  []  Pathology  []  Microbiology  []  EKG/Telemetry   []  Cardiology/Vascular   [x]  Old records  Today I reviewed Dr. Miranda's previous operative and pathology report.     Assessment and Plan    Diagnoses and all orders for this visit:    1. Screening for malignant neoplasm of colon (Primary)    2. History of colonic polyps    Other orders  -     polyethylene glycol (MIRALAX) 17 g packet; Take as directed.  Instructions given in office.  Dispense: 238 g bottle  Dispense: 238 packet; Refill: 0    (white prep)    Follow Up   Return for Schedule colonoscopy with Dr. Damico on 2/24 at [facility].     Hospital arrival time 1230    Possible risks/complications, benefits, and alternatives to surgical or invasive procedure have been explained to patient and/or legal guardian.     Patient has been evaluated and can tolerate anesthesia and/or sedation. Risks, benefits, and alternatives to anesthesia and sedation have been explained to patient and/or legal guardian.  Patient verbalizes understanding is willing to proceed with the above plan.    Patient was " given instructions and counseling regarding his condition or for health maintenance advice. Please see specific information pulled into the AVS if appropriate.

## 2021-12-14 ENCOUNTER — TREATMENT (OUTPATIENT)
Dept: PHYSICAL THERAPY | Facility: CLINIC | Age: 66
End: 2021-12-14

## 2021-12-14 DIAGNOSIS — R29.898 LEFT HAND WEAKNESS: Primary | ICD-10-CM

## 2021-12-14 PROCEDURE — 97110 THERAPEUTIC EXERCISES: CPT | Performed by: OCCUPATIONAL THERAPIST

## 2021-12-14 PROCEDURE — 97112 NEUROMUSCULAR REEDUCATION: CPT | Performed by: OCCUPATIONAL THERAPIST

## 2021-12-14 NOTE — PROGRESS NOTES
Re-Assessment / Re-Certification      Patient: Poncho Hernandez   : 1955  Diagnosis/ICD-10 Code:  Left hand weakness [R29.898]  Referring practitioner: TON Gonzáles  Date of Initial Visit: Type: THERAPY  Noted: 2021  Today's Date: 2021  Patient seen for 2 sessions      Subjective:   Poncho Hernandez reports: I feel like the nerve innervation is improving.  I can feel things. Getting better extension and functional fine motor control with L hand.  Able to control when cleaning up debris around the house.   Subjective Questionnaire: QuickDASH: 13  Clinical Progress: improved  Home Program Compliance: Yes  Treatment has included: therapeutic exercise, neuromuscular re-education and HEP    Subjective   Objective          Active Range of Motion     Left Wrist   Wrist flexion: 65 degrees   Wrist extension: 70 degrees     Additional Active Range of Motion Details    15-95 0-70    0-95 0-70    0-90 0-80    0-90 0-80    Strength/Myotome Testing     Left Wrist/Hand      (2nd hand position)   lbs: 95      Assessment/Plan    Visit Diagnoses:    ICD-10-CM ICD-9-CM   1. Left hand weakness  R29.898 728.87       Progress toward previous goals: Partially Met    Plan Goals: 1. . The patient has limited ROM of the digital extension  At MPs                  LTG 1: 12 weeks:  The patient will demonstrate less than 15 degrees extension lag at MPs to allow the patient to grasp.                                  STATUS:  NOT MET                  STG 1a: 4 weeks:  The patient will demonstrate less than 25 degrees of extension lag at MP.                                  STATUS:  NOT MET                            2. Carrying, Moving, and Handling Objects Functional Limitation                                   LTG 2: 12 weeks:  The patient will demonstrate 1-19% limitation by achieving a score of 15 on the Quick DASH.                                  STATUS:  MET                  STG  2a: 4 weeks:  The patient will demonstrate 1-19% limitation by achieving a score of 18 on the Quick DASH.                                    STATUS:  MET      Recommendations: Continue as planned  Timeframe: 3 months  Prognosis to achieve goals: good      Modification made for extension strap of IF and LF to be used during functional dancing and exercise to increase neuro feedback and extension of digits.     OT SIGNATURE: GRAZYNA Foster, OTR/L, CHT     Electronically signed    [place] LICENSE: [id number]   DATE TREATMENT INITIATED: 12/14/2021      90 Day Recertification  Certification Period: 12/14/2021 thru 3/13/2022  I certify that the therapy services are furnished while this patient is under my care.  The services outlined above are required by this patient, and will be reviewed every 90 days.      Based upon review of the patient's progress and continued therapy plan, it is my medical opinion that Poncho Hernandez should continue occupational therapy treatment at [facility]  [address]  [place] [address] [phone].    Signature: __________________________________  Anthony Mendez APRN    Timed:  Manual Therapy:    0     mins  35525;  Therapeutic Exercise:    10     mins  19878;  Therapeutic Activity:    0     mins  88434;     Neuromuscular Freda:    20    mins  08752;    Ultrasound:     0     mins  73718;    Electrical Stimulation:    0     mins  87609;    Untimed:  Electrical Stimulation:    0     mins  50008 ( );  Fluidotherapy:        0    mins  08370  Paraffin:                          0    mins  51146    Timed Treatment:   30   mins   Total Treatment:     30   mins

## 2021-12-31 RX ORDER — SULFAMETHOXAZOLE AND TRIMETHOPRIM 800; 160 MG/1; MG/1
1 TABLET ORAL 2 TIMES DAILY
Qty: 20 TABLET | Refills: 0 | Status: SHIPPED | OUTPATIENT
Start: 2021-12-31 | End: 2022-01-11

## 2022-01-03 ENCOUNTER — OFFICE VISIT (OUTPATIENT)
Dept: ORTHOPEDIC SURGERY | Facility: CLINIC | Age: 67
End: 2022-01-03

## 2022-01-03 VITALS — HEART RATE: 108 BPM | BODY MASS INDEX: 26.98 KG/M2 | OXYGEN SATURATION: 98 % | WEIGHT: 217 LBS | HEIGHT: 75 IN

## 2022-01-03 DIAGNOSIS — M72.0 DUPUYTREN'S DISEASE OF PALM OF RIGHT HAND: Primary | ICD-10-CM

## 2022-01-03 PROCEDURE — 99213 OFFICE O/P EST LOW 20 MIN: CPT | Performed by: STUDENT IN AN ORGANIZED HEALTH CARE EDUCATION/TRAINING PROGRAM

## 2022-01-03 NOTE — PROGRESS NOTES
"Chief Complaint  Follow-up of the Right Hand    Subjective          Poncho Hernandez presents to [facility] for   History of Present Illness    Poncho presents today for a follow of his right hand. Patient has Dupuytren's disease of the right hand. He states that he has noticed a nodule in his hand. He denies any decreased range of motion to the hand or fingers. Patient explains he received steroid injections in his hand to help with the nodule size.        No Known Allergies     Social History     Socioeconomic History   • Marital status:    Tobacco Use   • Smoking status: Never Smoker   • Smokeless tobacco: Never Used   Vaping Use   • Vaping Use: Never used   Substance and Sexual Activity   • Alcohol use: Never   • Drug use: Never        I reviewed the patient's chief complaint, history of present illness, review of systems, past medical history, surgical history, family history, social history, medications, and allergy list.     REVIEW OF SYSTEMS    Constitutional: Denies fevers, chills, weight loss  Cardiovascular: Denies chest pain, shortness of breath  Skin: Denies rashes, acute skin changes  Neurologic: Denies headache, loss of consciousness  MSK: Right hand follow up       Objective   Vital Signs:   Pulse 108   Ht 190.5 cm (75\")   Wt 98.4 kg (217 lb)   SpO2 98%   BMI 27.12 kg/m²     Body mass index is 27.12 kg/m².    Physical Exam    General: Alert. No acute distress.   Right upper extremity: Palpable nodule between the 4th and 5th fingers. Early band formation proximally. Full finger flexion and extension. Sensation intact in the median, radial, ulnar nerve distribution. Palpable radial pulse.     Procedures    Imaging Results (Most Recent)     None               Assessment and Plan    Diagnoses and all orders for this visit:    1. Dupuytren's disease of palm of right hand (Primary)      Poncho presents today for a follow up of his Dupuytren's contracture of the " right hand. Patient instructed to avoid stiffness by continuing his daily range of motion. Range of motion exercises were again demonstrated in the office. Patient is advised to contact the office if he experiences any stiffness. Follow up as needed.       Call or return if symptoms worsen or patient has any concerns.       Scribed for Arsenio Levin MD by Alona Bailey PA-C  01/03/2022   11:08 EST         Follow Up   No follow-ups on file.  Patient was given instructions and counseling regarding his condition or for health maintenance advice. Please see specific information pulled into the AVS if appropriate.       I have personally performed the services described in this document as scribed by the above individual and it is both accurate and complete.     Arsenio Levin MD  01/03/22  11:30 EST

## 2022-01-11 ENCOUNTER — OFFICE VISIT (OUTPATIENT)
Dept: CARDIOLOGY | Facility: CLINIC | Age: 67
End: 2022-01-11

## 2022-01-11 VITALS
BODY MASS INDEX: 27.48 KG/M2 | SYSTOLIC BLOOD PRESSURE: 141 MMHG | HEIGHT: 75 IN | HEART RATE: 90 BPM | DIASTOLIC BLOOD PRESSURE: 114 MMHG | WEIGHT: 221 LBS

## 2022-01-11 DIAGNOSIS — I10 ESSENTIAL HYPERTENSION: ICD-10-CM

## 2022-01-11 DIAGNOSIS — I48.0 PAROXYSMAL A-FIB: Primary | ICD-10-CM

## 2022-01-11 DIAGNOSIS — I71.20 THORACIC AORTIC ANEURYSM, WITHOUT RUPTURE: ICD-10-CM

## 2022-01-11 PROCEDURE — 99214 OFFICE O/P EST MOD 30 MIN: CPT | Performed by: INTERNAL MEDICINE

## 2022-01-11 NOTE — ASSESSMENT & PLAN NOTE
Symptomatic recurrences he was not interested in anticoagulation with NOAC did recommend at least aspirin 81 once a day repeat EKG on follow-up visit

## 2022-01-11 NOTE — ASSESSMENT & PLAN NOTE
Controlled at home continue with monitoring.  Also continue with lisinopril 40 mg once a day and minimizing diclofenac to use

## 2022-01-11 NOTE — PROGRESS NOTES
"Chief Complaint  Atrial Fibrillation and Hypertension    Subjective    Patient has been doing well he denies any tachycardia issues his home blood pressure log has been controlled    Past Medical History:   Diagnosis Date   • Abnormal ECG    • Aftercare following bilateral knee joint replacement surgery 04/03/2018   • Aneurysm (HCC)    • Arrhythmia    • Arthritis    • Bladder disorder    • BPH (benign prostatic hyperplasia)    • Elevated PSA     MANAGED WITHOUT MEDICATIONS, SITUATIONAL   • Forgetfulness    • H/O hernia repair    • Heart disease    • Heart murmur    • High blood pressure    • Hypertension    • Limb swelling    • Low testosterone in male    • Night sweats    • Paroxysmal A-fib (HCC) 8/1/2021   • Prostate disorder    • Sleep apnea          Current Outpatient Medications:   •  B-D 3CC LUER-RYAN SYR 22GX1\" 22G X 1\" 3 ML misc, USE TO INJECT TESTOSTERONE, Disp: , Rfl:   •  calcium carbonate-cholecalciferol 500-400 MG-UNIT tablet tablet, Take 2 tablets by mouth Daily., Disp: , Rfl:   •  Coenzyme Q10 200 MG capsule, Take 1 capsule by mouth Daily., Disp: , Rfl:   •  Garlic 100 MG tablet, Take 1 tablet by mouth Daily., Disp: , Rfl:   •  lisinopril (PRINIVIL,ZESTRIL) 40 MG tablet, TAKE 1 TABLET(40 MG) BY MOUTH EVERY DAY (Patient taking differently: Take 20 mg by mouth Daily.), Disp: 90 tablet, Rfl: 1  •  magnesium oxide (MAGOX) 400 (241.3 Mg) MG tablet tablet, Take 1 tablet by mouth Daily., Disp: , Rfl:   •  Omega-3 Fatty Acids (fish oil) 1000 MG capsule capsule, Take 1 capsule by mouth Daily With Breakfast., Disp: , Rfl:   •  Pumpkin Seed 500 MG capsule, Take  by mouth Daily., Disp: , Rfl:   •  Testosterone Cypionate (DEPOTESTOTERONE CYPIONATE) 200 MG/ML injection, Inject 1 mL into the appropriate muscle as directed by prescriber Every 14 (Fourteen) Days., Disp: , Rfl:   •  tretinoin (Retin-A) 0.025 % cream, Apply  topically to the appropriate area as directed Every Night., Disp: 45 g, Rfl: 0  •  triamcinolone " "(KENALOG) 0.1 % cream, Apply  topically to the appropriate area as directed 2 (Two) Times a Day. (Patient taking differently: Apply  topically to the appropriate area as directed As Needed.), Disp: 45 g, Rfl: 2  •  Vitamin E 400 units tablet, Take 1 tablet by mouth Daily., Disp: , Rfl:   •  Diclofenac Sodium (VOLTAREN) 1 % gel gel, As Needed., Disp: , Rfl:     Medications Discontinued During This Encounter   Medication Reason   • clobetasol (TEMOVATE) 0.05 % cream *Therapy completed   • GARLIC 1500 PO Duplicate order   • hydroquinone 4 % cream *Therapy completed   • fish oil-omega-3 fatty acids 1000 MG capsule Duplicate order   • sulfamethoxazole-trimethoprim (Bactrim DS) 800-160 MG per tablet *Therapy completed   • polyethylene glycol (MIRALAX) 17 g packet Discontinued by another clinician   • oxybutynin (DITROPAN) 5 MG tablet Discontinued by another clinician   • Grape Seed Extract 30 MG capsule Discontinued by another clinician     No Known Allergies     Social History     Tobacco Use   • Smoking status: Never Smoker   • Smokeless tobacco: Never Used   Vaping Use   • Vaping Use: Never used   Substance Use Topics   • Alcohol use: Never   • Drug use: Never       Family History   Problem Relation Age of Onset   • Stroke Mother    • Heart disease Mother    • Hypertension Mother    • Osteoporosis Mother    • Stroke Father    • Heart disease Father    • Cancer Father    • Heart attack Father    • Prostate cancer Father 88   • Osteoporosis Father    • Arthritis Father    • Arthritis Sister    • Arthritis Brother    • Hypertension Maternal Grandmother    • Colon cancer Other    • Colon cancer Other    • Arthritis Son         Objective     BP (!) 141/114   Pulse 90   Ht 190.5 cm (75\")   Wt 100 kg (221 lb)   BMI 27.62 kg/m²       Physical Exam    General Appearance:   · no acute distress  · Alert and oriented x3  HENT:   · lips not cyanotic  · Atraumatic  Neck:  · No jvd   · supple  Respiratory:  · no respiratory " distress  · normal breath sounds  · no rales  Cardiovascular:  · Regular rate and rhythm  · no S3, no S4   · no murmur  · no rub  Extremities  · No cyanosis  · lower extremity edema: none    Skin:   · warm, dry  · No rashes      Result Review :     No results found for: PROBNP    CBC w/diff    CBC w/Diff 4/16/21   WBC 6.72   RBC 4.93   Hemoglobin 14.3   Hematocrit 46.0   MCV 93.3   MCH 29.0   MCHC 31.1 (A)   RDW 17.0 (A)   Platelets 260   Neutrophil Rel % 64.1   Lymphocyte Rel % 23.2   Monocyte Rel % 10.3 (A)   Eosinophil Rel % 1.3   Basophil Rel % 0.7   (A) Abnormal value             Lab Results   Component Value Date    TSH 4.150 01/31/2020      Lab Results   Component Value Date    FREET4 1.3 01/31/2020      No results found for: DDIMERQUANT  No results found for: MG   No results found for: DIGOXIN   No results found for: TROPONINT          No results found for: POCTROP                   Diagnoses and all orders for this visit:    1. Paroxysmal A-fib (HCC) (Primary)  Assessment & Plan:  Symptomatic recurrences he was not interested in anticoagulation with NOAC did recommend at least aspirin 81 once a day repeat EKG on follow-up visit      2. Essential hypertension  Assessment & Plan:  Controlled at home continue with monitoring.  Also continue with lisinopril 40 mg once a day and minimizing diclofenac to use      3. Thoracic aortic aneurysm, without rupture (HCC)          Follow Up     Return in about 6 months (around 7/11/2022) for EKG with F/U, Follow with Neeru Portillo.          Patient was given instructions and counseling regarding his condition or for health maintenance advice. Please see specific information pulled into the AVS if appropriate.

## 2022-01-27 ENCOUNTER — HOSPITAL ENCOUNTER (OUTPATIENT)
Facility: HOSPITAL | Age: 67
Setting detail: SURGERY ADMIT
End: 2022-01-27
Attending: ORTHOPAEDIC SURGERY | Admitting: ORTHOPAEDIC SURGERY

## 2022-01-27 ENCOUNTER — OFFICE VISIT (OUTPATIENT)
Dept: CARDIOLOGY | Facility: CLINIC | Age: 67
End: 2022-01-27

## 2022-01-27 ENCOUNTER — PREP FOR SURGERY (OUTPATIENT)
Dept: OTHER | Facility: HOSPITAL | Age: 67
End: 2022-01-27

## 2022-01-27 VITALS
SYSTOLIC BLOOD PRESSURE: 149 MMHG | HEART RATE: 75 BPM | DIASTOLIC BLOOD PRESSURE: 101 MMHG | WEIGHT: 227 LBS | HEIGHT: 75 IN | BODY MASS INDEX: 28.23 KG/M2

## 2022-01-27 DIAGNOSIS — I10 ESSENTIAL HYPERTENSION: ICD-10-CM

## 2022-01-27 DIAGNOSIS — I48.0 PAF (PAROXYSMAL ATRIAL FIBRILLATION): Primary | ICD-10-CM

## 2022-01-27 DIAGNOSIS — R06.09 DYSPNEA ON EXERTION: ICD-10-CM

## 2022-01-27 DIAGNOSIS — I48.0 PAROXYSMAL A-FIB: Primary | ICD-10-CM

## 2022-01-27 DIAGNOSIS — I71.20 THORACIC AORTIC ANEURYSM, WITHOUT RUPTURE: ICD-10-CM

## 2022-01-27 PROCEDURE — 99214 OFFICE O/P EST MOD 30 MIN: CPT | Performed by: INTERNAL MEDICINE

## 2022-01-27 RX ORDER — METOPROLOL SUCCINATE 25 MG/1
25 TABLET, EXTENDED RELEASE ORAL
COMMUNITY
Start: 2022-01-21 | End: 2022-03-17

## 2022-01-27 RX ORDER — POTASSIUM CHLORIDE 750 MG/1
10 TABLET, FILM COATED, EXTENDED RELEASE ORAL DAILY PRN
Qty: 90 TABLET | Refills: 3 | Status: SHIPPED | OUTPATIENT
Start: 2022-01-27 | End: 2022-03-17

## 2022-01-27 RX ORDER — SODIUM CHLORIDE 0.9 % (FLUSH) 0.9 %
10 SYRINGE (ML) INJECTION AS NEEDED
Status: CANCELLED | OUTPATIENT
Start: 2022-01-27

## 2022-01-27 RX ORDER — FUROSEMIDE 40 MG/1
40 TABLET ORAL DAILY PRN
Qty: 30 TABLET | Refills: 3 | Status: SHIPPED | OUTPATIENT
Start: 2022-01-27 | End: 2022-03-17

## 2022-01-27 RX ORDER — APIXABAN 5 MG/1
5 TABLET, FILM COATED ORAL 2 TIMES DAILY
COMMUNITY
Start: 2022-01-21

## 2022-01-27 RX ORDER — SODIUM CHLORIDE 0.9 % (FLUSH) 0.9 %
3 SYRINGE (ML) INJECTION EVERY 12 HOURS SCHEDULED
Status: CANCELLED | OUTPATIENT
Start: 2022-01-27

## 2022-01-27 NOTE — ASSESSMENT & PLAN NOTE
patient with worsening symptoms of dyspnea feel might be suffering from volume overload and possibly CHF recommend checking echocardiogram and proBNP level we will start him empirically on Lasix 20 mg 1 potassium 10 mg once a day for the next 10 days as needed afterwards in addition we will see about cardioversion after 3 additional weeks of anticoagulation

## 2022-01-27 NOTE — ASSESSMENT & PLAN NOTE
He now with what appears to be persistent atrial fibrillation his rate does not appear to be doing poorly controlled feel that he can hold off on Toprol dosing given his worsening symptoms would recommend a trial of cardioversion after 3 more weeks of anticoagulation discussed risk-benefit alternatives he was agreeable

## 2022-01-27 NOTE — PROGRESS NOTES
"Chief Complaint  Atrial Fibrillation ([facility] said in afib), Fatigue (thinks from meds), and Shortness of Breath (at night)    Subjective    Patient fell about 2 weeks ago after for the next couple days he felt weak malaise just no energy [facility] and was found to be in atrial fibrillation and started on Toprol and Eliquis he was starting to feel better at that point but after starting the medication the symptoms of fatigue and weakness came back.. Does report some decrease stamina    Past Medical History:   Diagnosis Date   • Abnormal ECG    • Aftercare following bilateral knee joint replacement surgery 04/03/2018   • Aneurysm (HCC)    • Arrhythmia    • Arthritis    • Bladder disorder    • BPH (benign prostatic hyperplasia)    • Elevated PSA     MANAGED WITHOUT MEDICATIONS, SITUATIONAL   • Forgetfulness    • H/O hernia repair    • Heart disease    • Heart murmur    • High blood pressure    • Hypertension    • Limb swelling    • Low testosterone in male    • Night sweats    • Paroxysmal A-fib (HCC) 8/1/2021   • Prostate disorder    • Sleep apnea          Current Outpatient Medications:   •  B-D 3CC LUER-RYAN SYR 22GX1\" 22G X 1\" 3 ML misc, USE TO INJECT TESTOSTERONE, Disp: , Rfl:   •  calcium carbonate-cholecalciferol 500-400 MG-UNIT tablet tablet, Take 2 tablets by mouth Daily., Disp: , Rfl:   •  Coenzyme Q10 200 MG capsule, Take 1 capsule by mouth Daily., Disp: , Rfl:   •  Diclofenac Sodium (VOLTAREN) 1 % gel gel, As Needed., Disp: , Rfl:   •  Eliquis 5 MG tablet tablet, Take 5 mg by mouth 2 (Two) Times a Day., Disp: , Rfl:   •  Garlic 100 MG tablet, Take 1 tablet by mouth Daily., Disp: , Rfl:   •  lisinopril (PRINIVIL,ZESTRIL) 40 MG tablet, TAKE 1 TABLET(40 MG) BY MOUTH EVERY DAY (Patient taking differently: Take 40 mg by mouth Daily.), Disp: 90 tablet, Rfl: 1  •  magnesium oxide (MAGOX) 400 (241.3 Mg) MG tablet tablet, Take 1 tablet by mouth Daily., Disp: , Rfl:   •  metoprolol succinate XL " "(TOPROL-XL) 25 MG 24 hr tablet, Take 25 mg by mouth. 1/2 tab po qd, Disp: , Rfl:   •  Omega-3 Fatty Acids (fish oil) 1000 MG capsule capsule, Take 1 capsule by mouth Daily With Breakfast., Disp: , Rfl:   •  Pumpkin Seed 500 MG capsule, Take  by mouth Daily., Disp: , Rfl:   •  Testosterone Cypionate (DEPOTESTOTERONE CYPIONATE) 200 MG/ML injection, Inject 1 mL into the appropriate muscle as directed by prescriber Every 14 (Fourteen) Days., Disp: , Rfl:   •  tretinoin (Retin-A) 0.025 % cream, Apply  topically to the appropriate area as directed Every Night., Disp: 45 g, Rfl: 0  •  triamcinolone (KENALOG) 0.1 % cream, Apply  topically to the appropriate area as directed 2 (Two) Times a Day. (Patient taking differently: Apply  topically to the appropriate area as directed As Needed.), Disp: 45 g, Rfl: 2  •  Vitamin E 400 units tablet, Take 1 tablet by mouth Daily., Disp: , Rfl:   •  furosemide (LASIX) 40 MG tablet, Take 1 tablet by mouth Daily As Needed (sob)., Disp: 30 tablet, Rfl: 3  •  potassium chloride 10 MEQ CR tablet, Take 1 tablet by mouth Daily As Needed (take with lasix)., Disp: 90 tablet, Rfl: 3    There are no discontinued medications.  No Known Allergies     Social History     Tobacco Use   • Smoking status: Never Smoker   • Smokeless tobacco: Never Used   Vaping Use   • Vaping Use: Never used   Substance Use Topics   • Alcohol use: Never   • Drug use: Never       Family History   Problem Relation Age of Onset   • Stroke Mother    • Heart disease Mother    • Hypertension Mother    • Osteoporosis Mother    • Stroke Father    • Heart disease Father    • Cancer Father    • Heart attack Father    • Prostate cancer Father 88   • Osteoporosis Father    • Arthritis Father    • Arthritis Sister    • Arthritis Brother    • Hypertension Maternal Grandmother    • Colon cancer Other    • Colon cancer Other    • Arthritis Son         Objective     BP (!) 149/101   Pulse 75   Ht 190.5 cm (75\")   Wt 103 kg (227 lb)   " BMI 28.37 kg/m²       Physical Exam    General Appearance:   · no acute distress  · Alert and oriented x3  HENT:   · lips not cyanotic  · Atraumatic  Neck:  · No jvd   · supple  Respiratory:  · no respiratory distress  · Bilateral lungs crackles  · no rales  Cardiovascular:  · Irregular irregular  · no S3, no S4   · no murmur  · no rub  Extremities  · No cyanosis  · lower extremity edema: +1-2  Skin:   · warm, dry  · No rashes      Result Review :     No results found for: PROBNP    CBC w/diff    CBC w/Diff 4/16/21   WBC 6.72   RBC 4.93   Hemoglobin 14.3   Hematocrit 46.0   MCV 93.3   MCH 29.0   MCHC 31.1 (A)   RDW 17.0 (A)   Platelets 260   Neutrophil Rel % 64.1   Lymphocyte Rel % 23.2   Monocyte Rel % 10.3 (A)   Eosinophil Rel % 1.3   Basophil Rel % 0.7   (A) Abnormal value             Lab Results   Component Value Date    TSH 4.150 01/31/2020      Lab Results   Component Value Date    FREET4 1.3 01/31/2020                  Diagnoses and all orders for this visit:    1. Paroxysmal A-fib (HCC) (Primary)  Assessment & Plan:  He now with what appears to be persistent atrial fibrillation his rate does not appear to be doing poorly controlled feel that he can hold off on Toprol dosing given his worsening symptoms would recommend a trial of cardioversion after 3 more weeks of anticoagulation discussed risk-benefit alternatives he was agreeable    Orders:  -     XR Chest 2 View; Future  -     Adult Transthoracic Echo Complete W/ Cont if Necessary Per Protocol; Future  -     Basic Metabolic Panel; Future    2. Essential hypertension  Assessment & Plan:  Blood pressures in office was elevated recommended though keeping a log at home for review      3. Thoracic aortic aneurysm, without rupture (HCC)    4. Dyspnea on exertion  Assessment & Plan:   patient with worsening symptoms of dyspnea feel might be suffering from volume overload and possibly CHF recommend checking echocardiogram and proBNP level we will start him  empirically on Lasix 20 mg 1 potassium 10 mg once a day for the next 10 days as needed afterwards in addition we will see about cardioversion after 3 additional weeks of anticoagulation    Orders:  -     proBNP; Future  -     XR Chest 2 View; Future    Other orders  -     furosemide (LASIX) 40 MG tablet; Take 1 tablet by mouth Daily As Needed (sob).  Dispense: 30 tablet; Refill: 3  -     potassium chloride 10 MEQ CR tablet; Take 1 tablet by mouth Daily As Needed (take with lasix).  Dispense: 90 tablet; Refill: 3              Follow Up     No follow-ups on file.          Patient was given instructions and counseling regarding his condition or for health maintenance advice. Please see specific information pulled into the AVS if appropriate.

## 2022-02-03 ENCOUNTER — TELEPHONE (OUTPATIENT)
Dept: CARDIOLOGY | Facility: CLINIC | Age: 67
End: 2022-02-03

## 2022-02-03 ENCOUNTER — HOSPITAL ENCOUNTER (OUTPATIENT)
Dept: GENERAL RADIOLOGY | Facility: HOSPITAL | Age: 67
Discharge: HOME | End: 2022-02-03
Admitting: INTERNAL MEDICINE

## 2022-02-03 ENCOUNTER — LAB (OUTPATIENT)
Dept: LAB | Facility: HOSPITAL | Age: 67
End: 2022-02-03

## 2022-02-03 DIAGNOSIS — R06.09 DYSPNEA ON EXERTION: ICD-10-CM

## 2022-02-03 DIAGNOSIS — I48.0 PAROXYSMAL A-FIB: ICD-10-CM

## 2022-02-03 LAB
ANION GAP SERPL CALCULATED.3IONS-SCNC: 9.5 MMOL/L (ref 5–15)
BUN SERPL-MCNC: 24 MG/DL (ref 8–23)
BUN/CREAT SERPL: 21.4 (ref 7–25)
CALCIUM SPEC-SCNC: 9.6 MG/DL (ref 8.6–10.5)
CHLORIDE SERPL-SCNC: 102 MMOL/L (ref 98–107)
CO2 SERPL-SCNC: 27.5 MMOL/L (ref 22–29)
CREAT SERPL-MCNC: 1.12 MG/DL (ref 0.76–1.27)
GFR SERPL CREATININE-BSD FRML MDRD: 65 ML/MIN/1.73
GLUCOSE SERPL-MCNC: 106 MG/DL (ref 65–99)
NT-PROBNP SERPL-MCNC: 1003 PG/ML (ref 0–900)
POTASSIUM SERPL-SCNC: 4.4 MMOL/L (ref 3.5–5.2)
SODIUM SERPL-SCNC: 139 MMOL/L (ref 136–145)

## 2022-02-03 PROCEDURE — 36415 COLL VENOUS BLD VENIPUNCTURE: CPT

## 2022-02-03 PROCEDURE — 83880 ASSAY OF NATRIURETIC PEPTIDE: CPT

## 2022-02-03 PROCEDURE — 71046 X-RAY EXAM CHEST 2 VIEWS: CPT

## 2022-02-03 PROCEDURE — 80048 BASIC METABOLIC PNL TOTAL CA: CPT

## 2022-02-03 NOTE — TELEPHONE ENCOUNTER
----- Message from TON Gomez sent at 2/3/2022  9:15 AM EST -----  Notify pt cardiomegaly (enlarged heart) will be confirmed on echo that is scheduled for 2/22/22. The lasix Dr Iglesias started him on should help with the pulmonary vascular congestion. Keep follow up

## 2022-02-16 ENCOUNTER — PATIENT MESSAGE (OUTPATIENT)
Dept: CARDIOLOGY | Facility: CLINIC | Age: 67
End: 2022-02-16

## 2022-02-21 ENCOUNTER — TELEPHONE (OUTPATIENT)
Dept: SURGERY | Facility: CLINIC | Age: 67
End: 2022-02-21

## 2022-02-21 ENCOUNTER — HOSPITAL ENCOUNTER (OUTPATIENT)
Dept: INFUSION THERAPY | Facility: HOSPITAL | Age: 67
Discharge: HOME | End: 2022-02-21
Attending: INTERNAL MEDICINE | Admitting: INTERNAL MEDICINE

## 2022-02-21 DIAGNOSIS — I48.0 PAF (PAROXYSMAL ATRIAL FIBRILLATION): ICD-10-CM

## 2022-02-21 NOTE — TELEPHONE ENCOUNTER
Patient stopped by the office to CX his colonoscopy, He states he is in A-fib. He states he will call back and R/S once he gets cardiac clearance from Dr Iglesias. PCP notified.

## 2022-03-08 ENCOUNTER — APPOINTMENT (OUTPATIENT)
Dept: PREADMISSION TESTING | Facility: HOSPITAL | Age: 67
End: 2022-03-08

## 2022-03-14 ENCOUNTER — TREATMENT (OUTPATIENT)
Dept: PHYSICAL THERAPY | Facility: CLINIC | Age: 67
End: 2022-03-14

## 2022-03-14 DIAGNOSIS — R29.898 LEFT HAND WEAKNESS: Primary | ICD-10-CM

## 2022-03-14 PROCEDURE — 97530 THERAPEUTIC ACTIVITIES: CPT | Performed by: OCCUPATIONAL THERAPIST

## 2022-03-14 NOTE — PROGRESS NOTES
Re-Assessment / Re-Certification      Patient: Poncho Hernandez   : 1955  Diagnosis/ICD-10 Code:  Left hand weakness [R29.898]  Referring practitioner: TON Gonzáles  Date of Initial Visit: Type: THERAPY  Noted: 2021  Today's Date: 3/14/2022  Patient seen for 3 sessions      Subjective:   Poncho Hernandez reports: I am doing more with my L hand.  Pt reports he is gaining dexterity.  Feeling more extension.   Subjective Questionnaire: QuickDASH: 13  Clinical Progress: unchanged  Home Program Compliance: Yes  Treatment has included: therapeutic exercise, neuromuscular re-education, manual therapy, therapeutic activity and electrical stimulation    Subjective   Objective   Active Range of Motion      Right Wrist   Wrist extension: 75 degrees     Additional Active Range of Motion Details  75-90   20-90   0-60  75-90   10-90   0-60  80-90   15-90   0-60  40-90   0-90     0-60     Strength/Myotome Testing      Left Wrist/Hand       (2nd hand position)   lbs: 100     Right Wrist/Hand       (2nd hand position)   lbs: 105   Assessment/Plan    Visit Diagnoses:    ICD-10-CM ICD-9-CM   1. Left hand weakness  R29.898 728.87       Progress toward previous goals: Partially Met     Goals  Plan Goals: 1. . The patient has limited ROM of the digital extension  At MPs                  LTG 1: 12 weeks:  The patient will demonstrate less than 15 degrees extension lag at MPs to allow the patient to grasp.                                  STATUS:  NOT MET                  STG 1a: 4 weeks:  The patient will demonstrate less than 25 degrees of extension lag at MP.                                  STATUS:  NOT MET                         2. Carrying, Moving, and Handling Objects Functional Limitation                                   LTG 2: 12 weeks:  The patient will demonstrate 1-19% limitation by achieving a score of 15 on the Quick DASH.                                  STATUS:  MET                  STG 2a:  4 weeks:  The patient will demonstrate 1-19% limitation by achieving a score of 18 on the Quick DASH.                                    STATUS:  MET      Recommendations: Discharge  Timeframe: 3/14/22  Prognosis to achieve goals: good      OT SIGNATURE: GRAZYNA Foster, OTR/L, CHT     Electronically signed    [place] LICENSE: [id number]   DATE TREATMENT INITIATED: 3/14/2022      DISCHARGE  Certification Period: 3/14/2022 thru 6/11/2022  I certify that the therapy services are furnished while this patient is under my care.  The services outlined above are required by this patient, and will be reviewed every 90 days.      Based upon review of the patient's progress and continued therapy plan, it is my medical opinion that Poncho Hernandez should continue occupational therapy treatment at [facility]  [address] [place] [address]  [place] [address] [phone].    Signature: __________________________________  PHYSICIAN:   NPI:                                       DATE:      Timed:  Manual Therapy:    0     mins  23317;  Therapeutic Exercise:    0     mins  98901;  Therapeutic Activity:    23     mins  49359;     Neuromuscular Freda:    0    mins  67249;    Ultrasound:     0     mins  17324;    Electrical Stimulation:    0     mins  27687;    Untimed:  Electrical Stimulation:    0     mins  54132 ( );  Fluidotherapy:        0    mins  23266  Paraffin:                          0    mins  82571    Timed Treatment:   23   mins   Total Treatment:     23   mins

## 2022-03-15 ENCOUNTER — TRANSCRIBE ORDERS (OUTPATIENT)
Dept: ADMINISTRATIVE | Facility: HOSPITAL | Age: 67
End: 2022-03-15

## 2022-03-15 DIAGNOSIS — Z01.818 OTHER SPECIFIED PRE-OPERATIVE EXAMINATION: Primary | ICD-10-CM

## 2022-03-17 ENCOUNTER — OFFICE VISIT (OUTPATIENT)
Dept: FAMILY MEDICINE CLINIC | Facility: CLINIC | Age: 67
End: 2022-03-17

## 2022-03-17 VITALS
DIASTOLIC BLOOD PRESSURE: 86 MMHG | TEMPERATURE: 97.1 F | BODY MASS INDEX: 27.33 KG/M2 | HEIGHT: 75 IN | WEIGHT: 219.8 LBS | SYSTOLIC BLOOD PRESSURE: 142 MMHG | OXYGEN SATURATION: 99 % | HEART RATE: 88 BPM

## 2022-03-17 DIAGNOSIS — I48.0 PAROXYSMAL A-FIB: Primary | ICD-10-CM

## 2022-03-17 DIAGNOSIS — R73.03 PREDIABETES: ICD-10-CM

## 2022-03-17 DIAGNOSIS — L98.9 SKIN LESION: ICD-10-CM

## 2022-03-17 DIAGNOSIS — I50.9 ACUTE HEART FAILURE, UNSPECIFIED HEART FAILURE TYPE: ICD-10-CM

## 2022-03-17 LAB
ALBUMIN SERPL-MCNC: 4.3 G/DL (ref 3.5–5.2)
ALBUMIN/GLOB SERPL: 1.5 G/DL
ALP SERPL-CCNC: 64 U/L (ref 39–117)
ALT SERPL W P-5'-P-CCNC: 25 U/L (ref 1–41)
ANION GAP SERPL CALCULATED.3IONS-SCNC: 10.5 MMOL/L (ref 5–15)
AST SERPL-CCNC: 32 U/L (ref 1–40)
BASOPHILS # BLD AUTO: 0.07 10*3/MM3 (ref 0–0.2)
BASOPHILS NFR BLD AUTO: 1 % (ref 0–1.5)
BILIRUB SERPL-MCNC: 1.6 MG/DL (ref 0–1.2)
BUN SERPL-MCNC: 22 MG/DL (ref 8–23)
BUN/CREAT SERPL: 18.3 (ref 7–25)
CALCIUM SPEC-SCNC: 9.7 MG/DL (ref 8.6–10.5)
CHLORIDE SERPL-SCNC: 102 MMOL/L (ref 98–107)
CO2 SERPL-SCNC: 25.5 MMOL/L (ref 22–29)
CREAT SERPL-MCNC: 1.2 MG/DL (ref 0.76–1.27)
DEPRECATED RDW RBC AUTO: 52.5 FL (ref 37–54)
EGFRCR SERPLBLD CKD-EPI 2021: 65.9 ML/MIN/1.73
EOSINOPHIL # BLD AUTO: 0.14 10*3/MM3 (ref 0–0.4)
EOSINOPHIL NFR BLD AUTO: 1.9 % (ref 0.3–6.2)
ERYTHROCYTE [DISTWIDTH] IN BLOOD BY AUTOMATED COUNT: 16.4 % (ref 12.3–15.4)
GLOBULIN UR ELPH-MCNC: 2.8 GM/DL
GLUCOSE SERPL-MCNC: 91 MG/DL (ref 65–99)
HCT VFR BLD AUTO: 49.3 % (ref 37.5–51)
HGB BLD-MCNC: 15.9 G/DL (ref 13–17.7)
IMM GRANULOCYTES # BLD AUTO: 0.02 10*3/MM3 (ref 0–0.05)
IMM GRANULOCYTES NFR BLD AUTO: 0.3 % (ref 0–0.5)
LYMPHOCYTES # BLD AUTO: 1.69 10*3/MM3 (ref 0.7–3.1)
LYMPHOCYTES NFR BLD AUTO: 23.4 % (ref 19.6–45.3)
MCH RBC QN AUTO: 28.6 PG (ref 26.6–33)
MCHC RBC AUTO-ENTMCNC: 32.3 G/DL (ref 31.5–35.7)
MCV RBC AUTO: 88.8 FL (ref 79–97)
MONOCYTES # BLD AUTO: 0.76 10*3/MM3 (ref 0.1–0.9)
MONOCYTES NFR BLD AUTO: 10.5 % (ref 5–12)
NEUTROPHILS NFR BLD AUTO: 4.55 10*3/MM3 (ref 1.7–7)
NEUTROPHILS NFR BLD AUTO: 62.9 % (ref 42.7–76)
NRBC BLD AUTO-RTO: 0 /100 WBC (ref 0–0.2)
NT-PROBNP SERPL-MCNC: 1163 PG/ML (ref 0–900)
PLATELET # BLD AUTO: 198 10*3/MM3 (ref 140–450)
PMV BLD AUTO: 11.4 FL (ref 6–12)
POTASSIUM SERPL-SCNC: 4.5 MMOL/L (ref 3.5–5.2)
PROT SERPL-MCNC: 7.1 G/DL (ref 6–8.5)
RBC # BLD AUTO: 5.55 10*6/MM3 (ref 4.14–5.8)
SODIUM SERPL-SCNC: 138 MMOL/L (ref 136–145)
WBC NRBC COR # BLD: 7.23 10*3/MM3 (ref 3.4–10.8)

## 2022-03-17 PROCEDURE — 99214 OFFICE O/P EST MOD 30 MIN: CPT | Performed by: NURSE PRACTITIONER

## 2022-03-17 PROCEDURE — 83036 HEMOGLOBIN GLYCOSYLATED A1C: CPT | Performed by: NURSE PRACTITIONER

## 2022-03-17 PROCEDURE — 83880 ASSAY OF NATRIURETIC PEPTIDE: CPT | Performed by: NURSE PRACTITIONER

## 2022-03-17 PROCEDURE — 17110 DESTRUCTION B9 LES UP TO 14: CPT | Performed by: NURSE PRACTITIONER

## 2022-03-17 PROCEDURE — 85025 COMPLETE CBC W/AUTO DIFF WBC: CPT | Performed by: NURSE PRACTITIONER

## 2022-03-17 PROCEDURE — 93000 ELECTROCARDIOGRAM COMPLETE: CPT | Performed by: NURSE PRACTITIONER

## 2022-03-17 PROCEDURE — 80053 COMPREHEN METABOLIC PANEL: CPT | Performed by: NURSE PRACTITIONER

## 2022-03-17 RX ORDER — FUROSEMIDE 20 MG/1
20 TABLET ORAL DAILY
COMMUNITY
End: 2022-07-12

## 2022-03-17 NOTE — PROGRESS NOTES
"Chief Complaint  Aifb    Subjective          Poncho Hernandez presents to [facility]  History of Present Illness  Presents today for follow-up on proximal atrial fibrillation.  He was recently diagnosed with A. fib again.  He was restarted on Toprol 25 mg daily.  After taking Toprol he developed shortness of breath and congestion.  Toprol was stopped and placed on Lasix 20 mg daily.  He did not tolerate the 40 mg daily.  He was scheduled for a cardioversion at the hospital.  He did not want to wait at the hospital all day.  Yesterday he skipped his Lasix.  Last night he reports having some chest congestion.  Once he took the Lasix the congestion improved.    He reports a new skin lesion on his right temporal.  Denies pain and irritation.  Denies redness and swelling.  Objective   Vital Signs:   /86   Pulse 88   Temp 97.1 °F (36.2 °C)   Ht 190.5 cm (75\")   Wt 99.7 kg (219 lb 12.8 oz)   SpO2 99%   BMI 27.47 kg/m²     Physical Exam  Vitals reviewed.   Constitutional:       Appearance: Normal appearance. He is well-developed.   HENT:      Head: Normocephalic and atraumatic.      Right Ear: External ear normal.      Left Ear: External ear normal.      Mouth/Throat:      Pharynx: No oropharyngeal exudate.   Eyes:      Conjunctiva/sclera: Conjunctivae normal.      Pupils: Pupils are equal, round, and reactive to light.   Cardiovascular:      Rate and Rhythm: Normal rate. Rhythm irregularly irregular.      Heart sounds: No murmur heard.    No friction rub. No gallop.   Pulmonary:      Effort: Pulmonary effort is normal.      Breath sounds: Normal breath sounds. No wheezing or rhonchi.   Abdominal:      General: Bowel sounds are normal. There is no distension.      Palpations: Abdomen is soft.      Tenderness: There is no abdominal tenderness.   Musculoskeletal:      Right lower leg: No edema.      Left lower leg: No edema.   Skin:     General: Skin is warm and dry.      " Findings: Lesion present.      Comments: Papule lesion right temporal approximately 2 mm in diameter, flesh-colored   Neurological:      Mental Status: He is alert and oriented to person, place, and time.   Psychiatric:         Mood and Affect: Mood and affect normal.         Behavior: Behavior normal.         Thought Content: Thought content normal.         Judgment: Judgment normal.        Result Review :     Common labs    Common Labsle 4/16/21 2/3/22   Glucose  106 (A)   BUN  24 (A)   Creatinine  1.12   eGFR Non African Am  65   Sodium  139   Potassium  4.4   Chloride  102   Calcium  9.6   WBC 6.72    Hemoglobin 14.3    Hematocrit 46.0    Platelets 260    (A) Abnormal value                 ECG 12 Lead    Date/Time: 3/17/2022 10:43 AM  Performed by: Anthony Mendez APRN  Authorized by: Anthony Mendez APRN   Comparison: compared with previous ECG from 1/21/2022  Similar to previous ECG  Rhythm: atrial fibrillation  Rate: normal  Conduction: conduction normal  ST Segments: ST segments normal  T Waves: T waves normal  QRS axis: normal  Other findings: left ventricular hypertrophy    Clinical impression: abnormal EKG  Comments: Atrial fibrillation, rate 92, QRST 103, , QTcB 465, axis QRS 58, T 65      Cryotherapy, Skin Lesion    Date/Time: 3/17/2022 10:54 AM  Performed by: Anthony Mendez APRN  Authorized by: Anthony Mendez APRN   Local anesthesia used: no    Anesthesia:  Local anesthesia used: no    Sedation:  Patient sedated: no    Comments: Risk and benefits of cryotherapy for skin lesion were discussed with the patient prior to initiating the procedure.  Patient was given opportunity to ask questions.  Patient provided verbal consent to treatment.  Each area of concern was treated with 3-4 rounds of cryotherapy.  Patient overall tolerated the procedure well.  There were no immediate complications.            Assessment and Plan    Diagnoses and all orders for this visit:    1. Paroxysmal A-fib (HCC)  (Primary)  -     CBC & Differential  -     Comprehensive Metabolic Panel  -     ECG 12 Lead    2. Prediabetes  -     Hemoglobin A1c    3. Acute heart failure, unspecified heart failure type (HCC)   -     proBNP    4. Skin lesion  -     Cryotherapy, Skin Lesion    Will check CBC CMP.  EKG shows continued to be in atrial fibrillation.  Rate is controlled.  We will also check a hemoglobin A1c for history of prediabetes.  With the fluid congestion last night we will repeat a BNP.  Instructed him to take his Lasix 20 mg daily.  He is eating 3 bananas a day for potassium supplement.      Follow Up   Return in about 3 months (around 6/17/2022), or if symptoms worsen or fail to improve, for Next scheduled follow up.  Patient was given instructions and counseling regarding his condition or for health maintenance advice. Please see specific information pulled into the AVS if appropriate.

## 2022-03-18 ENCOUNTER — LAB (OUTPATIENT)
Dept: LAB | Facility: HOSPITAL | Age: 67
End: 2022-03-18

## 2022-03-18 DIAGNOSIS — Z01.818 OTHER SPECIFIED PRE-OPERATIVE EXAMINATION: ICD-10-CM

## 2022-03-18 LAB — HBA1C MFR BLD: 6.4 % (ref 4.8–5.6)

## 2022-03-31 DIAGNOSIS — L30.9 ECZEMA, UNSPECIFIED TYPE: ICD-10-CM

## 2022-03-31 RX ORDER — TRIAMCINOLONE ACETONIDE 1 MG/G
CREAM TOPICAL
Qty: 45 G | Refills: 2 | Status: SHIPPED | OUTPATIENT
Start: 2022-03-31 | End: 2022-12-04 | Stop reason: SDUPTHER

## 2022-05-13 ENCOUNTER — TELEPHONE (OUTPATIENT)
Dept: FAMILY MEDICINE CLINIC | Facility: CLINIC | Age: 67
End: 2022-05-13

## 2022-05-13 RX ORDER — LISINOPRIL 40 MG/1
40 TABLET ORAL DAILY
Qty: 90 TABLET | Refills: 1 | Status: SHIPPED | OUTPATIENT
Start: 2022-05-13 | End: 2022-08-15 | Stop reason: SDUPTHER

## 2022-05-13 RX ORDER — LISINOPRIL 40 MG/1
40 TABLET ORAL DAILY
Qty: 90 TABLET | Refills: 1 | Status: SHIPPED | OUTPATIENT
Start: 2022-05-13 | End: 2022-05-13 | Stop reason: SDUPTHER

## 2022-05-13 NOTE — TELEPHONE ENCOUNTER
Medication has been sent in. Will call Dr Hernandez and let him know its been sent to [facility] as requested.

## 2022-05-26 ENCOUNTER — OFFICE VISIT (OUTPATIENT)
Dept: FAMILY MEDICINE CLINIC | Facility: CLINIC | Age: 67
End: 2022-05-26

## 2022-05-26 VITALS
WEIGHT: 222.2 LBS | DIASTOLIC BLOOD PRESSURE: 74 MMHG | HEIGHT: 75 IN | HEART RATE: 72 BPM | SYSTOLIC BLOOD PRESSURE: 128 MMHG | BODY MASS INDEX: 27.63 KG/M2 | OXYGEN SATURATION: 98 % | TEMPERATURE: 96.9 F

## 2022-05-26 DIAGNOSIS — R73.03 PREDIABETES: ICD-10-CM

## 2022-05-26 DIAGNOSIS — I50.9 CONGESTIVE HEART FAILURE, UNSPECIFIED HF CHRONICITY, UNSPECIFIED HEART FAILURE TYPE: ICD-10-CM

## 2022-05-26 DIAGNOSIS — I48.0 PAROXYSMAL A-FIB: Primary | ICD-10-CM

## 2022-05-26 PROCEDURE — 83036 HEMOGLOBIN GLYCOSYLATED A1C: CPT | Performed by: NURSE PRACTITIONER

## 2022-05-26 PROCEDURE — 99214 OFFICE O/P EST MOD 30 MIN: CPT | Performed by: NURSE PRACTITIONER

## 2022-05-26 PROCEDURE — 93000 ELECTROCARDIOGRAM COMPLETE: CPT | Performed by: NURSE PRACTITIONER

## 2022-05-26 PROCEDURE — 80053 COMPREHEN METABOLIC PANEL: CPT | Performed by: NURSE PRACTITIONER

## 2022-05-26 PROCEDURE — 83880 ASSAY OF NATRIURETIC PEPTIDE: CPT | Performed by: NURSE PRACTITIONER

## 2022-05-26 RX ORDER — PROPRANOLOL HYDROCHLORIDE 10 MG/1
10 TABLET ORAL 2 TIMES DAILY
Qty: 180 TABLET | Refills: 1 | Status: SHIPPED | OUTPATIENT
Start: 2022-05-26

## 2022-05-26 NOTE — PROGRESS NOTES
"Chief Complaint  Abnormal ECG (Requesting EKG )    Subjective          Poncho Hernandez presents to [facility]  History of Present Illness  Presents today for follow-up on A. fib.  Patient denies chest pain, syncope, palpitations.  Patient states he is feeling much better than when he had symptoms of congestive heart failure.  Denies swelling in lower extremities.  When patient was placed on atenolol patient did not tolerate medication which caused him to go into heart failure.  Patient states he is taking Eliquis 5 mg twice daily.  Also currently taking lisinopril 40 mg daily for hypertension.  He has prediabetes.  Hemoglobin A1c had increased to 6.4%.  He has been working on modifying his lifestyle including his diet and exercising.    Objective   Vital Signs:  /74   Pulse 72   Temp 96.9 °F (36.1 °C)   Ht 190.5 cm (75\")   Wt 101 kg (222 lb 3.2 oz)   SpO2 98%   BMI 27.77 kg/m²         Physical Exam  Vitals reviewed.   Constitutional:       Appearance: Normal appearance. He is well-developed.   HENT:      Head: Normocephalic and atraumatic.      Right Ear: External ear normal.      Left Ear: External ear normal.      Mouth/Throat:      Pharynx: No oropharyngeal exudate.   Eyes:      Conjunctiva/sclera: Conjunctivae normal.      Pupils: Pupils are equal, round, and reactive to light.   Cardiovascular:      Rate and Rhythm: Normal rate. Rhythm irregular.      Heart sounds: No murmur heard.    No friction rub. No gallop.   Pulmonary:      Effort: Pulmonary effort is normal.      Breath sounds: Normal breath sounds. No wheezing or rhonchi.   Abdominal:      General: Bowel sounds are normal. There is no distension.      Palpations: Abdomen is soft.      Tenderness: There is no abdominal tenderness.   Skin:     General: Skin is warm and dry.   Neurological:      Mental Status: He is alert and oriented to person, place, and time.   Psychiatric:         Mood and Affect: Mood " and affect normal.         Behavior: Behavior normal.         Thought Content: Thought content normal.         Judgment: Judgment normal.        Result Review :            ECG 12 Lead    Date/Time: 5/26/2022 6:23 PM  Performed by: Anthony Mendez APRN  Authorized by: Anthony Mendez APRN   Comparison: compared with previous ECG   Similar to previous ECG  Rhythm: sinus rhythm and atrial fibrillation  Rate: normal  BPM: 80  Conduction: conduction normal  ST Segments: ST segments normal  T Waves: T waves normal  QRS axis: normal    Clinical impression: abnormal EKG  Comments: Atrial fibrillation, rate 80, QRST 93, , QTcB 452, axis QRS 10 T 50              Assessment and Plan    Diagnoses and all orders for this visit:    1. Paroxysmal A-fib (HCC) (Primary)  -     propranolol (INDERAL) 10 MG tablet; Take 1 tablet by mouth 2 (Two) Times a Day.  Dispense: 180 tablet; Refill: 1    2. Congestive heart failure, unspecified HF chronicity, unspecified heart failure type (HCC)  -     proBNP  -     Comprehensive metabolic panel    3. Prediabetes  -     Hemoglobin A1c    Other orders  -     ECG 12 Lead    Atrial fibrillation we will start propanolol 10 mg daily may increase to twice daily for rate control.  We will check BNP and CMP for congestive heart failure.  Recheck hemoglobin A1c for prediabetes.  Encouraged him to continually avoid carbs and sugar increase activity.  If heart rate is not well controlled we will need to consider cardioversion.         Follow Up   Return in about 3 months (around 8/26/2022), or if symptoms worsen or fail to improve, for Next scheduled follow up.  Patient was given instructions and counseling regarding his condition or for health maintenance advice. Please see specific information pulled into the AVS if appropriate.

## 2022-05-27 LAB
ALBUMIN SERPL-MCNC: 4.1 G/DL (ref 3.5–5.2)
ALBUMIN/GLOB SERPL: 1.5 G/DL
ALP SERPL-CCNC: 65 U/L (ref 39–117)
ALT SERPL W P-5'-P-CCNC: 28 U/L (ref 1–41)
ANION GAP SERPL CALCULATED.3IONS-SCNC: 11.4 MMOL/L (ref 5–15)
AST SERPL-CCNC: 34 U/L (ref 1–40)
BILIRUB SERPL-MCNC: 1 MG/DL (ref 0–1.2)
BUN SERPL-MCNC: 27 MG/DL (ref 8–23)
BUN/CREAT SERPL: 27 (ref 7–25)
CALCIUM SPEC-SCNC: 9.4 MG/DL (ref 8.6–10.5)
CHLORIDE SERPL-SCNC: 103 MMOL/L (ref 98–107)
CO2 SERPL-SCNC: 23.6 MMOL/L (ref 22–29)
CREAT SERPL-MCNC: 1 MG/DL (ref 0.76–1.27)
EGFRCR SERPLBLD CKD-EPI 2021: 82 ML/MIN/1.73
GLOBULIN UR ELPH-MCNC: 2.7 GM/DL
GLUCOSE SERPL-MCNC: 93 MG/DL (ref 65–99)
HBA1C MFR BLD: 6 % (ref 4.8–5.6)
NT-PROBNP SERPL-MCNC: 1132 PG/ML (ref 0–900)
POTASSIUM SERPL-SCNC: 4.5 MMOL/L (ref 3.5–5.2)
PROT SERPL-MCNC: 6.8 G/DL (ref 6–8.5)
SODIUM SERPL-SCNC: 138 MMOL/L (ref 136–145)

## 2022-06-07 ENCOUNTER — TRANSCRIBE ORDERS (OUTPATIENT)
Dept: ADMINISTRATIVE | Facility: HOSPITAL | Age: 67
End: 2022-06-07

## 2022-06-07 DIAGNOSIS — I71.21 ASCENDING AORTIC ANEURYSM: Primary | ICD-10-CM

## 2022-06-28 ENCOUNTER — HOSPITAL ENCOUNTER (OUTPATIENT)
Dept: CT IMAGING | Facility: HOSPITAL | Age: 67
Discharge: HOME | End: 2022-06-28
Admitting: NURSE PRACTITIONER

## 2022-06-28 DIAGNOSIS — I71.21 ASCENDING AORTIC ANEURYSM: ICD-10-CM

## 2022-06-28 PROCEDURE — 71250 CT THORAX DX C-: CPT

## 2022-07-01 ENCOUNTER — TELEPHONE (OUTPATIENT)
Dept: FAMILY MEDICINE CLINIC | Facility: CLINIC | Age: 67
End: 2022-07-01

## 2022-07-01 NOTE — TELEPHONE ENCOUNTER
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 Please advise.

## 2022-07-03 DIAGNOSIS — R73.03 PREDIABETES: Primary | ICD-10-CM

## 2022-07-03 DIAGNOSIS — I50.9 CONGESTIVE HEART FAILURE, UNSPECIFIED HF CHRONICITY, UNSPECIFIED HEART FAILURE TYPE: ICD-10-CM

## 2022-07-07 ENCOUNTER — LAB (OUTPATIENT)
Dept: LAB | Facility: HOSPITAL | Age: 67
End: 2022-07-07

## 2022-07-07 DIAGNOSIS — I50.9 CONGESTIVE HEART FAILURE, UNSPECIFIED HF CHRONICITY, UNSPECIFIED HEART FAILURE TYPE: ICD-10-CM

## 2022-07-07 DIAGNOSIS — R73.03 PREDIABETES: ICD-10-CM

## 2022-07-07 LAB
ALBUMIN SERPL-MCNC: 4.2 G/DL (ref 3.5–5.2)
ALBUMIN UR-MCNC: 1.5 MG/DL
ALBUMIN/GLOB SERPL: 1.7 G/DL
ALP SERPL-CCNC: 63 U/L (ref 39–117)
ALT SERPL W P-5'-P-CCNC: 23 U/L (ref 1–41)
ANION GAP SERPL CALCULATED.3IONS-SCNC: 9.9 MMOL/L (ref 5–15)
AST SERPL-CCNC: 24 U/L (ref 1–40)
BASOPHILS # BLD AUTO: 0.05 10*3/MM3 (ref 0–0.2)
BASOPHILS NFR BLD AUTO: 1 % (ref 0–1.5)
BILIRUB SERPL-MCNC: 1.2 MG/DL (ref 0–1.2)
BUN SERPL-MCNC: 25 MG/DL (ref 8–23)
BUN/CREAT SERPL: 26.9 (ref 7–25)
CALCIUM SPEC-SCNC: 8.9 MG/DL (ref 8.6–10.5)
CHLORIDE SERPL-SCNC: 102 MMOL/L (ref 98–107)
CO2 SERPL-SCNC: 26.1 MMOL/L (ref 22–29)
CREAT SERPL-MCNC: 0.93 MG/DL (ref 0.76–1.27)
DEPRECATED RDW RBC AUTO: 46 FL (ref 37–54)
EGFRCR SERPLBLD CKD-EPI 2021: 89.4 ML/MIN/1.73
EOSINOPHIL # BLD AUTO: 0.14 10*3/MM3 (ref 0–0.4)
EOSINOPHIL NFR BLD AUTO: 2.7 % (ref 0.3–6.2)
ERYTHROCYTE [DISTWIDTH] IN BLOOD BY AUTOMATED COUNT: 13.2 % (ref 12.3–15.4)
GLOBULIN UR ELPH-MCNC: 2.5 GM/DL
GLUCOSE SERPL-MCNC: 121 MG/DL (ref 65–99)
HCT VFR BLD AUTO: 47.2 % (ref 37.5–51)
HGB BLD-MCNC: 15.6 G/DL (ref 13–17.7)
IMM GRANULOCYTES # BLD AUTO: 0.01 10*3/MM3 (ref 0–0.05)
IMM GRANULOCYTES NFR BLD AUTO: 0.2 % (ref 0–0.5)
LYMPHOCYTES # BLD AUTO: 1.31 10*3/MM3 (ref 0.7–3.1)
LYMPHOCYTES NFR BLD AUTO: 25.1 % (ref 19.6–45.3)
MCH RBC QN AUTO: 31.5 PG (ref 26.6–33)
MCHC RBC AUTO-ENTMCNC: 33.1 G/DL (ref 31.5–35.7)
MCV RBC AUTO: 95.2 FL (ref 79–97)
MONOCYTES # BLD AUTO: 0.47 10*3/MM3 (ref 0.1–0.9)
MONOCYTES NFR BLD AUTO: 9 % (ref 5–12)
NEUTROPHILS NFR BLD AUTO: 3.24 10*3/MM3 (ref 1.7–7)
NEUTROPHILS NFR BLD AUTO: 62 % (ref 42.7–76)
NRBC BLD AUTO-RTO: 0 /100 WBC (ref 0–0.2)
NT-PROBNP SERPL-MCNC: 566 PG/ML (ref 0–900)
PLATELET # BLD AUTO: 211 10*3/MM3 (ref 140–450)
PMV BLD AUTO: 10.9 FL (ref 6–12)
POTASSIUM SERPL-SCNC: 4.2 MMOL/L (ref 3.5–5.2)
PROT SERPL-MCNC: 6.7 G/DL (ref 6–8.5)
RBC # BLD AUTO: 4.96 10*6/MM3 (ref 4.14–5.8)
SODIUM SERPL-SCNC: 138 MMOL/L (ref 136–145)
WBC NRBC COR # BLD: 5.22 10*3/MM3 (ref 3.4–10.8)

## 2022-07-07 PROCEDURE — 84681 ASSAY OF C-PEPTIDE: CPT

## 2022-07-07 PROCEDURE — 82043 UR ALBUMIN QUANTITATIVE: CPT

## 2022-07-07 PROCEDURE — 83525 ASSAY OF INSULIN: CPT

## 2022-07-07 PROCEDURE — 85025 COMPLETE CBC W/AUTO DIFF WBC: CPT

## 2022-07-07 PROCEDURE — 36415 COLL VENOUS BLD VENIPUNCTURE: CPT

## 2022-07-07 PROCEDURE — 80053 COMPREHEN METABOLIC PANEL: CPT

## 2022-07-07 PROCEDURE — 83880 ASSAY OF NATRIURETIC PEPTIDE: CPT

## 2022-07-08 LAB — C PEPTIDE SERPL-MCNC: 2.8 NG/ML (ref 1.1–4.4)

## 2022-07-10 LAB — INSULIN SERPL-ACNC: 5 UIU/ML

## 2022-07-12 ENCOUNTER — OFFICE VISIT (OUTPATIENT)
Dept: CARDIOLOGY | Facility: CLINIC | Age: 67
End: 2022-07-12

## 2022-07-12 VITALS
HEART RATE: 75 BPM | SYSTOLIC BLOOD PRESSURE: 160 MMHG | DIASTOLIC BLOOD PRESSURE: 88 MMHG | WEIGHT: 219.6 LBS | BODY MASS INDEX: 27.3 KG/M2 | HEIGHT: 75 IN

## 2022-07-12 DIAGNOSIS — I10 ESSENTIAL HYPERTENSION: ICD-10-CM

## 2022-07-12 DIAGNOSIS — I48.0 PAROXYSMAL A-FIB: Primary | ICD-10-CM

## 2022-07-12 DIAGNOSIS — I71.20 THORACIC AORTIC ANEURYSM, WITHOUT RUPTURE: ICD-10-CM

## 2022-07-12 PROBLEM — M19.90 ARTHRITIS: Status: ACTIVE | Noted: 2020-02-09

## 2022-07-12 PROBLEM — Q25.43 ANEURYSM OF AORTIC ROOT: Status: ACTIVE | Noted: 2021-06-15

## 2022-07-12 PROBLEM — R06.09 DYSPNEA ON EXERTION: Status: RESOLVED | Noted: 2022-01-27 | Resolved: 2022-07-12

## 2022-07-12 PROBLEM — F41.8 DEPRESSION WITH ANXIETY: Status: ACTIVE | Noted: 2022-07-12

## 2022-07-12 PROBLEM — I71.21 ANEURYSM OF AORTIC ROOT: Status: ACTIVE | Noted: 2021-06-15

## 2022-07-12 PROCEDURE — 99214 OFFICE O/P EST MOD 30 MIN: CPT | Performed by: NURSE PRACTITIONER

## 2022-07-12 PROCEDURE — 93000 ELECTROCARDIOGRAM COMPLETE: CPT | Performed by: NURSE PRACTITIONER

## 2022-07-12 NOTE — PROGRESS NOTES
Chief Complaint  Atrial Fibrillation and Hypertension    Subjective            History of Present Illness  Pnocho Hernandez is a 66-year-old white/ male patient who presents to the office today for follow-up.  He has paroxysmal atrial fibrillation, thoracic aortic aneurysm, and hypertension.  He reports compliance with all of his medications.  He denies any chest pain, shortness of breath, lightheadedness/dizziness, palpitations, or edema.    PMH  Past Medical History:   Diagnosis Date   • Abnormal ECG    • Aftercare following bilateral knee joint replacement surgery 04/03/2018   • Aneurysm (HCC)    • Arrhythmia    • Arthritis    • Bladder disorder    • BPH (benign prostatic hyperplasia)    • Elevated PSA     MANAGED WITHOUT MEDICATIONS, SITUATIONAL   • Forgetfulness    • H/O hernia repair    • Heart disease    • Heart murmur    • High blood pressure    • Hypertension    • Limb swelling    • Low testosterone in male    • Night sweats    • Paroxysmal A-fib (HCC) 8/1/2021   • Prostate disorder    • Sleep apnea          ALLERGY  No Known Allergies       SURGICALHX  Past Surgical History:   Procedure Laterality Date   • ADENOIDECTOMY  1958   • COLONOSCOPY     • HAND SURGERY Left 2005   • HERNIA REPAIR     • JOINT REPLACEMENT      ARTIFICIAL JOINTS/LIMBS   • OTHER SURGICAL HISTORY Left     ARM SURGERY   • OTHER SURGICAL HISTORY      METAL IMPLANTS   • PROSTATE BIOPSY     • REPLACEMENT TOTAL KNEE BILATERAL  03/19/2018    BY LEVI   • TURP / TRANSURETHRAL INCISION / DRAINAGE PROSTATE  05/31/2018          SOC  Social History     Socioeconomic History   • Marital status:    Tobacco Use   • Smoking status: Never Smoker   • Smokeless tobacco: Never Used   Vaping Use   • Vaping Use: Never used   Substance and Sexual Activity   • Alcohol use: Never   • Drug use: Never   • Sexual activity: Defer         FAMHX  Family History   Problem Relation Age of Onset   • Stroke Mother    • Heart disease Mother    •  "Hypertension Mother    • Osteoporosis Mother    • Stroke Father    • Heart disease Father    • Cancer Father    • Heart attack Father    • Prostate cancer Father 88   • Osteoporosis Father    • Arthritis Father    • Arthritis Sister    • Arthritis Brother    • Hypertension Maternal Grandmother    • Colon cancer Other    • Colon cancer Other    • Arthritis Son           MEDSIGONLY  Current Outpatient Medications on File Prior to Visit   Medication Sig   • Diclofenac Sodium (VOLTAREN) 1 % gel gel As Needed.   • Eliquis 5 MG tablet tablet Take 5 mg by mouth 2 (Two) Times a Day.   • lisinopril (PRINIVIL,ZESTRIL) 40 MG tablet Take 1 tablet by mouth Daily.   • propranolol (INDERAL) 10 MG tablet Take 1 tablet by mouth 2 (Two) Times a Day.   • triamcinolone (KENALOG) 0.1 % cream APPLY TOPICALLY TO THE AFFECTED AREA TWICE DAILY AS DIRECTED   • [DISCONTINUED] B-D 3CC LUER-RYAN SYR 22GX1\" 22G X 1\" 3 ML misc USE TO INJECT TESTOSTERONE   • [DISCONTINUED] calcium carbonate-cholecalciferol 500-400 MG-UNIT tablet tablet Take 2 tablets by mouth Daily.   • [DISCONTINUED] Coenzyme Q10 200 MG capsule Take 1 capsule by mouth Daily.   • [DISCONTINUED] furosemide (LASIX) 20 MG tablet Take 20 mg by mouth Daily.   • [DISCONTINUED] Garlic 100 MG tablet Take 1 tablet by mouth Daily.   • [DISCONTINUED] magnesium oxide (MAGOX) 400 (241.3 Mg) MG tablet tablet Take 1 tablet by mouth Daily.   • [DISCONTINUED] Omega-3 Fatty Acids (fish oil) 1000 MG capsule capsule Take 1 capsule by mouth Daily With Breakfast.   • [DISCONTINUED] Pumpkin Seed 500 MG capsule Take  by mouth Daily.   • [DISCONTINUED] Testosterone Cypionate (DEPOTESTOTERONE CYPIONATE) 200 MG/ML injection Inject 100 mg into the appropriate muscle as directed by prescriber Every 14 (Fourteen) Days.   • [DISCONTINUED] tretinoin (Retin-A) 0.025 % cream Apply  topically to the appropriate area as directed Every Night.   • [DISCONTINUED] Vitamin E 400 units tablet Take 1 tablet by mouth Daily. " "    No current facility-administered medications on file prior to visit.       Objective   /88   Pulse 75   Ht 190.5 cm (75\")   Wt 99.6 kg (219 lb 9.6 oz)   BMI 27.45 kg/m²    Physical Exam  HENT:      Head: Normocephalic.   Neck:      Vascular: No carotid bruit.   Cardiovascular:      Rate and Rhythm: Normal rate and regular rhythm.      Pulses: Normal pulses.      Heart sounds: Normal heart sounds. No murmur heard.  Pulmonary:      Effort: Pulmonary effort is normal.      Breath sounds: Normal breath sounds.   Musculoskeletal:      Cervical back: Neck supple.      Right lower leg: No edema.      Left lower leg: No edema.   Skin:     General: Skin is dry.      Capillary Refill: Capillary refill takes less than 2 seconds.   Neurological:      Mental Status: He is alert and oriented to person, place, and time.       ECG 12 Lead    Date/Time: 7/12/2022 1:57 PM  Performed by: Neeru Portillo APRN  Authorized by: Nacho Tesfaye MD   Comparison: compared with previous ECG   Similar to previous ECG  Rhythm: atrial fibrillation  Rate: normal  BPM: 98  Conduction: conduction normal  ST Segments: ST segments normal  T Waves: T waves normal  QRS axis: normal  Other findings: T wave abnormality    Clinical impression: abnormal EKG          Result Review :   The following data was reviewed by: TON Gardner on 07/12/2022:  proBNP   Date Value Ref Range Status   07/07/2022 566.0 0.0 - 900.0 pg/mL Final     CMP    CMP 7/7/22   Glucose 121 (A)   BUN 25 (A)   Creatinine 0.93   Sodium 138   Potassium 4.2   Chloride 102   Calcium 8.9   Albumin 4.20   Total Bilirubin 1.2   Alkaline Phosphatase 63   AST (SGOT) 24   ALT (SGPT) 23   (A) Abnormal value            CBC w/diff    CBC w/Diff 7/7/22   WBC 5.22   RBC 4.96   Hemoglobin 15.6   Hematocrit 47.2   MCV 95.2   MCH 31.5   MCHC 33.1   RDW 13.2   Platelets 211   Neutrophil Rel % 62.0   Immature Granulocyte Rel % 0.2   Lymphocyte Rel % 25.1   Monocyte Rel % " 9.0   Eosinophil Rel % 2.7   Basophil Rel % 1.0   (A) Abnormal value             Lab Results   Component Value Date    TSH 4.150 01/31/2020      Lab Results   Component Value Date    FREET4 1.3 01/31/2020      No results found for: DDIMERQUANT  No results found for: MG   No results found for: DIGOXIN   No results found for: TROPONINT             Assessment and Plan    Diagnoses and all orders for this visit:    1. Paroxysmal A-fib (Primary)  Symptomatically stable at this time continue propranolol 10 mg twice daily.  Continue Eliquis for CVA prevention.  -     ECG 12 Lead    2. Thoracic aortic aneurysm, without rupture  He has follow-up scheduled with Dr. David and echo is scheduled for 8/25/2022.    3. Essential hypertension  Elevated in office today, check blood pressure twice a day for the next two weeks, blood pressure log provided for patient. Will review log once available to me and will adjust medications as needed          Follow Up   Return in about 6 months (around 1/12/2023) for Follow up with Dr Iglesias.    Patient was given instructions and counseling regarding his condition or for health maintenance advice. Please see specific information pulled into the AVS if appropriate.     Poncho Hernandez  reports that he has never smoked. He has never used smokeless tobacco.      Neeru Portillo, TON  07/12/22  14:11 EDT    Dictated Utilizing Dragon Dictation

## 2022-08-01 ENCOUNTER — TELEPHONE (OUTPATIENT)
Dept: CARDIOLOGY | Facility: CLINIC | Age: 67
End: 2022-08-01

## 2022-08-15 ENCOUNTER — TELEPHONE (OUTPATIENT)
Dept: FAMILY MEDICINE CLINIC | Facility: CLINIC | Age: 67
End: 2022-08-15

## 2022-08-15 RX ORDER — LISINOPRIL 40 MG/1
40 TABLET ORAL DAILY
Qty: 90 TABLET | Refills: 3 | Status: SHIPPED | OUTPATIENT
Start: 2022-08-15

## 2022-08-24 ENCOUNTER — TELEPHONE (OUTPATIENT)
Dept: FAMILY MEDICINE CLINIC | Facility: CLINIC | Age: 67
End: 2022-08-24

## 2022-08-29 ENCOUNTER — TELEPHONE (OUTPATIENT)
Dept: CARDIOLOGY | Facility: CLINIC | Age: 67
End: 2022-08-29

## 2022-08-29 NOTE — TELEPHONE ENCOUNTER
----- Message from TON Gomez sent at 8/29/2022  2:20 PM EDT -----  Notify pt echo result: EF 55% which means he has good heart muscle function and no valve disease noted. Follow up as scheduled

## 2022-09-01 ENCOUNTER — TELEPHONE (OUTPATIENT)
Dept: UROLOGY | Facility: CLINIC | Age: 67
End: 2022-09-01

## 2022-09-12 ENCOUNTER — TELEPHONE (OUTPATIENT)
Dept: SURGERY | Facility: CLINIC | Age: 67
End: 2022-09-12

## 2022-09-12 NOTE — TELEPHONE ENCOUNTER
Pt was seen in December and scheduled for colon, he went into A-fib and had to cancel. He stopped by office to see about getting rescheduled. Will he need another consultation? He sees Dr Iglesias for cardiology.

## 2022-09-27 ENCOUNTER — PREP FOR SURGERY (OUTPATIENT)
Dept: OTHER | Facility: HOSPITAL | Age: 67
End: 2022-09-27

## 2022-09-27 ENCOUNTER — OFFICE VISIT (OUTPATIENT)
Dept: SURGERY | Facility: CLINIC | Age: 67
End: 2022-09-27

## 2022-09-27 VITALS — BODY MASS INDEX: 28.23 KG/M2 | HEIGHT: 75 IN | WEIGHT: 227 LBS | HEART RATE: 77 BPM

## 2022-09-27 DIAGNOSIS — Z12.11 SCREENING FOR MALIGNANT NEOPLASM OF COLON: Primary | ICD-10-CM

## 2022-09-27 DIAGNOSIS — Z86.010 HISTORY OF COLONIC POLYPS: ICD-10-CM

## 2022-09-27 PROCEDURE — S0260 H&P FOR SURGERY: HCPCS | Performed by: NURSE PRACTITIONER

## 2022-09-27 NOTE — PROGRESS NOTES
Chief Complaint: Colonoscopy (consult)    Subjective      Colonoscopy consult      History of Present Illness  Poncho Hernandez is a 66 y.o. male presents to [facility] for colonoscopy consult.    Patient presents today without complaints for screening colonoscopy.    He denies any abdominal pain, change in bowel habit, rectal bleeding.    Admits to family history with his paternal aunt and uncle of colon cancer.    Admits to history of colonic polyps.    5/16: Colonoscopy ([facility]): Sigmoid - tubular adenoma; Rectum - hyperplastic; diverticulosis.    Patient reports that he is taking Eliquis daily for A. Fib,  and is under the care of Dr. Iglesias.  I will get cardiac clearance prior to the procedure  Objective     Past Medical History:   Diagnosis Date   • Abnormal ECG    • Aftercare following bilateral knee joint replacement surgery 04/03/2018   • Aneurysm (HCC)    • Arrhythmia    • Arthritis    • Bladder disorder    • BPH (benign prostatic hyperplasia)    • Elevated PSA     MANAGED WITHOUT MEDICATIONS, SITUATIONAL   • Forgetfulness    • H/O hernia repair    • Heart disease    • Heart murmur    • High blood pressure    • Hypertension    • Limb swelling    • Low testosterone in male    • Night sweats    • Paroxysmal A-fib (HCC) 8/1/2021   • Prostate disorder    • Sleep apnea        Past Surgical History:   Procedure Laterality Date   • ADENOIDECTOMY  1958   • COLONOSCOPY     • HAND SURGERY Left 2005   • HERNIA REPAIR     • JOINT REPLACEMENT      ARTIFICIAL JOINTS/LIMBS   • OTHER SURGICAL HISTORY Left     ARM SURGERY   • OTHER SURGICAL HISTORY      METAL IMPLANTS   • PROSTATE BIOPSY     • REPLACEMENT TOTAL KNEE BILATERAL  03/19/2018    BY LEVI   • TURP / TRANSURETHRAL INCISION / DRAINAGE PROSTATE  05/31/2018       Outpatient Medications Marked as Taking for the 9/27/22 encounter (Office Visit) with Jodi Stevens APRN   Medication Sig Dispense Refill   • Diclofenac Sodium (VOLTAREN) 1  "% gel gel As Needed.     • lisinopril (PRINIVIL,ZESTRIL) 40 MG tablet Take 1 tablet by mouth Daily. 90 tablet 3   • propranolol (INDERAL) 10 MG tablet Take 1 tablet by mouth 2 (Two) Times a Day. 180 tablet 1       No Known Allergies     Family History   Problem Relation Age of Onset   • Stroke Mother    • Heart disease Mother    • Hypertension Mother    • Osteoporosis Mother    • Stroke Father    • Heart disease Father    • Cancer Father    • Heart attack Father    • Prostate cancer Father 88   • Osteoporosis Father    • Arthritis Father    • Arthritis Sister    • Arthritis Brother    • Hypertension Maternal Grandmother    • Colon cancer Other    • Colon cancer Other    • Arthritis Son        Social History     Socioeconomic History   • Marital status:    Tobacco Use   • Smoking status: Never Smoker   • Smokeless tobacco: Never Used   Vaping Use   • Vaping Use: Never used   Substance and Sexual Activity   • Alcohol use: Never   • Drug use: Never   • Sexual activity: Defer       Review of Systems   Constitutional: Negative for chills and fever.   Gastrointestinal: Negative for abdominal distention, abdominal pain, anal bleeding, blood in stool, constipation, diarrhea and rectal pain.        Vital Signs:   Pulse 77   Ht 190.5 cm (75\")   Wt 103 kg (227 lb)   BMI 28.37 kg/m²      Physical Exam  Constitutional:       General: He is not in acute distress.     Appearance: Normal appearance.   HENT:      Head: Normocephalic.   Cardiovascular:      Rate and Rhythm: Normal rate.   Pulmonary:      Effort: Pulmonary effort is normal.      Breath sounds: No stridor. No wheezing.   Abdominal:      General: Abdomen is flat.      Palpations: Abdomen is soft.   Musculoskeletal:         General: No deformity.   Skin:     General: Skin is warm and dry.      Coloration: Skin is not jaundiced.   Neurological:      General: No focal deficit present.      Mental Status: He is alert and oriented to person, place, and time. "   Psychiatric:         Mood and Affect: Mood normal.         Thought Content: Thought content normal.          Result Review :   []  Laboratory  []  Radiology  []  Pathology  []  Microbiology  []  EKG/Telemetry   []  Cardiology/Vascular   [x]  Old records  Today I reviewed Dr. Miranda's previous operative and pathology report.     Assessment and Plan    Diagnoses and all orders for this visit:    1. Screening for malignant neoplasm of colon (Primary)    2. History of colonic polyps    white prep    Follow Up   Return for Schedule colonoscopy with Dr. Damico on 12/23/2022 at [facility].     Hospital arrival time: 0900    Possible risks/complications, benefits, and alternatives to surgical or invasive procedure have been explained to patient and/or legal guardian.     Patient has been evaluated and can tolerate anesthesia and/or sedation. Risks, benefits, and alternatives to anesthesia and sedation have been explained to patient and/or legal guardian.  Patient verbalizes understanding is willing to proceed with the above plan.    Patient was given instructions and counseling regarding his condition or for health maintenance advice. Please see specific information pulled into the AVS if appropriate.     The office note was dictated with the help of the dragon dictation system.

## 2022-10-13 ENCOUNTER — TELEPHONE (OUTPATIENT)
Dept: FAMILY MEDICINE CLINIC | Facility: CLINIC | Age: 67
End: 2022-10-13

## 2022-10-13 DIAGNOSIS — M19.90 ARTHRITIS: Primary | ICD-10-CM

## 2022-10-13 NOTE — TELEPHONE ENCOUNTER
Caller: Poncho Hernandez    Relationship: Self    Best call back number:     What is the medical concern/diagnosis: SINCE MARCH 2018 ISSUES IN LEG MUSCLES    What specialty or service is being requested: RHEUMATOLOGY     What is the provider, practice or medical service name: [facility]    What is the office phone number: [phone]    Any additional details: PLEASE CALL WHEN ORDER IS PLACED

## 2022-10-24 ENCOUNTER — TELEPHONE (OUTPATIENT)
Dept: FAMILY MEDICINE CLINIC | Facility: CLINIC | Age: 67
End: 2022-10-24

## 2022-10-24 NOTE — TELEPHONE ENCOUNTER
Caller: Poncho Hernandez    Relationship: Self    Best call back number: [phone]        What specialty or service is being requested: RHEUMATOLOGY       What is the office phone number: [phone]    Any additional details: REFERRAL HAS BEEN APPROVED BUT WHEN PATIENT TRIED TO SCHEDULE APPOINTMENT HE WAS ADVISED THAT THIS OFFICE HAS TO SCHEDULE THE APPOINTMENT FOR HIM.  CALL THE ABOVE NUMBER  THE FIRST AVAILABLE APPOINTMENT WOULD NOT BE UNTIL February OR MARCH.  PLEASE CONTACT WHEN APPOINTMENT HAS BEEN SCHEDULED   PATIENT WOULD ALSO BE ABLE TO GO TO [place] IF THERE IS A RHEUMATOLOGIST WHO HAS A GOOD REPUTATION  WHO WOULD BE ABLE TO SEE HIM SOONER

## 2022-10-25 ENCOUNTER — TELEPHONE (OUTPATIENT)
Dept: FAMILY MEDICINE CLINIC | Facility: CLINIC | Age: 67
End: 2022-10-25

## 2022-10-28 ENCOUNTER — TELEPHONE (OUTPATIENT)
Dept: SURGERY | Facility: CLINIC | Age: 67
End: 2022-10-28

## 2022-10-28 NOTE — TELEPHONE ENCOUNTER
Procedure: Colonoscopy    Medication Directive: Eliquis    PMH: Afib, HTN, CAD, Thoracic aortic aneurysm without rupture    Last Seen: 07/12/22    ECHO: (8/25/22)  • Calculated left ventricular EF = 55% Estimated left ventricular EF was in agreement with the calculated left ventricular EF.  • Left atrial dilation mild

## 2022-10-28 NOTE — TELEPHONE ENCOUNTER
Patient: Poncho Hernandez  YOB: 1955      This patient is waiting to have a Colonoscopy which will be perform at [facility] on 12/23/22 by Dr. Damico.    Our records indicate this patient is currently taking eliquis . This procedure requires the patient to suspend their Eliquis 2 days prior to surgery.     Please respond to this request noting your recommendations. You may contact our office at [phone] Option 2 with any questions. I appreciate your prompt response in this matter.     Please return this form to our office as soon as possible to [phone]    ____ I approve my patient to stop taking their Eliquis  _____ days prior to the scheduled procedure.    ____ I do NOT approve my patient to stop taking their Anticoagulant Therapy medication at this time.    ____ I approve my patient from a cardiac standpoint.    ____ I do NOT approve my patient from a cardiac standpoint at this time.    Approving physician name (please print):     _____________________________________________    Approving physician signature:     ________________________________            Date:________________      Sincerely,    TON Prescott

## 2022-11-03 ENCOUNTER — OFFICE VISIT (OUTPATIENT)
Dept: FAMILY MEDICINE CLINIC | Facility: CLINIC | Age: 67
End: 2022-11-03

## 2022-11-03 VITALS
HEIGHT: 75 IN | BODY MASS INDEX: 27.83 KG/M2 | WEIGHT: 223.8 LBS | HEART RATE: 84 BPM | TEMPERATURE: 97.9 F | DIASTOLIC BLOOD PRESSURE: 78 MMHG | SYSTOLIC BLOOD PRESSURE: 126 MMHG | OXYGEN SATURATION: 100 %

## 2022-11-03 DIAGNOSIS — Z00.00 MEDICARE ANNUAL WELLNESS VISIT, SUBSEQUENT: Primary | ICD-10-CM

## 2022-11-03 PROCEDURE — G0439 PPPS, SUBSEQ VISIT: HCPCS | Performed by: NURSE PRACTITIONER

## 2022-11-03 PROCEDURE — 1126F AMNT PAIN NOTED NONE PRSNT: CPT | Performed by: NURSE PRACTITIONER

## 2022-11-03 PROCEDURE — 1160F RVW MEDS BY RX/DR IN RCRD: CPT | Performed by: NURSE PRACTITIONER

## 2022-11-03 PROCEDURE — 1170F FXNL STATUS ASSESSED: CPT | Performed by: NURSE PRACTITIONER

## 2022-11-03 NOTE — PROGRESS NOTES
The ABCs of the Annual Wellness Visit  Subsequent Medicare Wellness Visit    Chief Complaint   Patient presents with   • Medicare Wellness-subsequent      Subjective    History of Present Illness:  Poncho Hernandez is a 66 y.o. male who presents for a Subsequent Medicare Wellness Visit.    The following portions of the patient's history were reviewed and   updated as appropriate: allergies, current medications, past family history, past medical history, past social history, past surgical history and problem list.    Compared to one year ago, the patient feels his physical   health is better. Except for quad weakness    Compared to one year ago, the patient feels his mental   health is better.    Recent Hospitalizations:  He was not admitted to the hospital during the last year.       Current Medical Providers:  Patient Care Team:  Anthony Mendez APRN as PCP - General (Nurse Practitioner)  Jodi Stevens APRN as Nurse Practitioner (Nurse Practitioner)    Outpatient Medications Prior to Visit   Medication Sig Dispense Refill   • lisinopril (PRINIVIL,ZESTRIL) 40 MG tablet Take 1 tablet by mouth Daily. 90 tablet 3   • propranolol (INDERAL) 10 MG tablet Take 1 tablet by mouth 2 (Two) Times a Day. 180 tablet 1   • triamcinolone (KENALOG) 0.1 % cream APPLY TOPICALLY TO THE AFFECTED AREA TWICE DAILY AS DIRECTED 45 g 2   • Diclofenac Sodium (VOLTAREN) 1 % gel gel As Needed.     • Eliquis 5 MG tablet tablet Take 5 mg by mouth 2 (Two) Times a Day.       No facility-administered medications prior to visit.       No opioid medication identified on active medication list. I have reviewed chart for other potential  high risk medication/s and harmful drug interactions in the elderly.          Aspirin is not on active medication list.  Aspirin use is contraindicated for this patient due to: current use of Eliquis.  .    Patient Active Problem List   Diagnosis   • Left hand weakness   • Eczema   • Thoracic aortic aneurysm, without  "rupture   • Hypotestosteronism   • Essential hypertension   • Paroxysmal A-fib (HCC)   • Elevated prostate specific antigen (PSA)   • Nocturia   • BPH with obstruction/lower urinary tract symptoms   • Dupuytren's disease of palm of right hand   • Screening for malignant neoplasm of colon   • History of colonic polyps   • Aneurysm of aortic root   • Arthritis   • Depression with anxiety     Advance Care Planning  Advance Directive is not on file.  ACP discussion was held with the patient during this visit. Patient does not have an advance directive, information provided.          Objective    Vitals:    11/03/22 1132   BP: 126/78   Pulse: 84   Temp: 97.9 °F (36.6 °C)   SpO2: 100%   Weight: 102 kg (223 lb 12.8 oz)   Height: 190.5 cm (75\")   PainSc: 0-No pain     Estimated body mass index is 27.97 kg/m² as calculated from the following:    Height as of this encounter: 190.5 cm (75\").    Weight as of this encounter: 102 kg (223 lb 12.8 oz).    BMI is >= 25 and <30. (Overweight) The following options were offered after discussion;: exercise counseling/recommendations and nutrition counseling/recommendations      Does the patient have evidence of cognitive impairment? No    Physical Exam  Vitals reviewed.   Constitutional:       Appearance: Normal appearance. He is well-developed.   HENT:      Head: Normocephalic and atraumatic.      Right Ear: External ear normal.      Left Ear: External ear normal.      Mouth/Throat:      Pharynx: No oropharyngeal exudate.   Eyes:      Conjunctiva/sclera: Conjunctivae normal.      Pupils: Pupils are equal, round, and reactive to light.   Cardiovascular:      Rate and Rhythm: Normal rate. Rhythm irregular.      Heart sounds: No murmur heard.    No friction rub. No gallop.   Pulmonary:      Effort: Pulmonary effort is normal.      Breath sounds: Normal breath sounds. No wheezing or rhonchi.   Abdominal:      General: Bowel sounds are normal. There is no distension.      Palpations: " Abdomen is soft.      Tenderness: There is no abdominal tenderness.   Skin:     General: Skin is warm and dry.   Neurological:      Mental Status: He is alert and oriented to person, place, and time.   Psychiatric:         Mood and Affect: Mood and affect normal.         Behavior: Behavior normal.         Thought Content: Thought content normal.         Judgment: Judgment normal.                 HEALTH RISK ASSESSMENT    Smoking Status:  Social History     Tobacco Use   Smoking Status Not on file   Smokeless Tobacco Never     Alcohol Consumption:  Social History     Substance and Sexual Activity   Alcohol Use Never     Fall Risk Screen:    STEADI Fall Risk Assessment was completed, and patient is at MODERATE risk for falls. Assessment completed on:11/3/2022    Depression Screening:  PHQ-2/PHQ-9 Depression Screening 11/3/2022   Retired PHQ-9 Total Score -   Retired Total Score -   Little Interest or Pleasure in Doing Things 1-->several days   Feeling Down, Depressed or Hopeless 0-->not at all   PHQ-9: Brief Depression Severity Measure Score 1       Health Habits and Functional and Cognitive Screening:  Functional & Cognitive Status 11/3/2022   Do you have difficulty preparing food and eating? No   Do you have difficulty bathing yourself, getting dressed or grooming yourself? No   Do you have difficulty using the toilet? No   Do you have difficulty moving around from place to place? No   Do you have trouble with steps or getting out of a bed or a chair? Yes   Current Diet Well Balanced Diet   Dental Exam Up to date   Eye Exam Not up to date   Exercise (times per week) 6 times per week   Current Exercises Include Walking;Yard Work;Aerobics   Current Exercise Activities Include -   Do you need help using the phone?  No   Are you deaf or do you have serious difficulty hearing?  No   Do you need help with transportation? No   Do you need help shopping? No   Do you need help preparing meals?  No   Do you need help with  housework?  No   Do you need help with laundry? No   Do you need help taking your medications? No   Do you need help managing money? No   Do you ever drive or ride in a car without wearing a seat belt? No   Have you felt unusual stress, anger or loneliness in the last month? No   Who do you live with? Spouse   If you need help, do you have trouble finding someone available to you? No   Have you been bothered in the last four weeks by sexual problems? No   Do you have difficulty concentrating, remembering or making decisions? No       Age-appropriate Screening Schedule:  Refer to the list below for future screening recommendations based on patient's age, sex and/or medical conditions. Orders for these recommended tests are listed in the plan section. The patient has been provided with a written plan.    Health Maintenance   Topic Date Due   • TDAP/TD VACCINES (3 - Td or Tdap) 03/08/2028   • INFLUENZA VACCINE  Completed   • ZOSTER VACCINE  Completed              Assessment & Plan   CMS Preventative Services Quick Reference  Risk Factors Identified During Encounter  Cardiovascular Disease  Obesity/Overweight   Polypharmacy  The above risks/problems have been discussed with the patient.  Follow up actions/plans if indicated are seen below in the Assessment/Plan Section.  Pertinent information has been shared with the patient in the After Visit Summary.    Diagnoses and all orders for this visit:    1. Medicare annual wellness visit, subsequent (Primary)        Follow Up:   Return in about 3 months (around 2/3/2023), or if symptoms worsen or fail to improve, for Next scheduled follow up.     An After Visit Summary and PPPS were made available to the patient.

## 2022-11-30 ENCOUNTER — LAB (OUTPATIENT)
Dept: LAB | Facility: HOSPITAL | Age: 67
End: 2022-11-30

## 2022-11-30 ENCOUNTER — TRANSCRIBE ORDERS (OUTPATIENT)
Dept: LAB | Facility: HOSPITAL | Age: 67
End: 2022-11-30

## 2022-11-30 DIAGNOSIS — E55.9 AVITAMINOSIS D: ICD-10-CM

## 2022-11-30 DIAGNOSIS — M62.81 MUSCLE WEAKNESS (GENERALIZED): ICD-10-CM

## 2022-11-30 DIAGNOSIS — Z11.59 SCREENING EXAMINATION FOR POLIOMYELITIS: ICD-10-CM

## 2022-11-30 DIAGNOSIS — K76.89 LIVER DYSFUNCTION: ICD-10-CM

## 2022-11-30 DIAGNOSIS — Z01.89 DIAGNOSTIC SKIN AND SENSITIZATION TESTS: ICD-10-CM

## 2022-11-30 DIAGNOSIS — Z79.899 ENCOUNTER FOR LONG-TERM (CURRENT) USE OF OTHER MEDICATIONS: Primary | ICD-10-CM

## 2022-11-30 DIAGNOSIS — Z79.899 ENCOUNTER FOR LONG-TERM (CURRENT) USE OF OTHER MEDICATIONS: ICD-10-CM

## 2022-11-30 LAB
25(OH)D3 SERPL-MCNC: 45.3 NG/ML (ref 30–100)
ANION GAP SERPL CALCULATED.3IONS-SCNC: 5.7 MMOL/L (ref 5–15)
BUN SERPL-MCNC: 16 MG/DL (ref 8–23)
BUN/CREAT SERPL: 15.2 (ref 7–25)
CALCIUM SPEC-SCNC: 9.2 MG/DL (ref 8.6–10.5)
CHLORIDE SERPL-SCNC: 104 MMOL/L (ref 98–107)
CK SERPL-CCNC: 230 U/L (ref 20–200)
CO2 SERPL-SCNC: 27.3 MMOL/L (ref 22–29)
CREAT SERPL-MCNC: 1.05 MG/DL (ref 0.76–1.27)
CRP SERPL-MCNC: <0.3 MG/DL (ref 0–0.5)
EGFRCR SERPLBLD CKD-EPI 2021: 76.8 ML/MIN/1.73
ERYTHROCYTE [SEDIMENTATION RATE] IN BLOOD: 2 MM/HR (ref 0–20)
GLUCOSE SERPL-MCNC: 121 MG/DL (ref 65–99)
HAV IGM SERPL QL IA: NORMAL
HBV CORE IGM SERPL QL IA: NORMAL
HBV SURFACE AG SERPL QL IA: NORMAL
HCV AB SER DONR QL: NORMAL
LDH SERPL-CCNC: 233 U/L (ref 135–225)
POTASSIUM SERPL-SCNC: 3.9 MMOL/L (ref 3.5–5.2)
SODIUM SERPL-SCNC: 137 MMOL/L (ref 136–145)

## 2022-11-30 PROCEDURE — 82085 ASSAY OF ALDOLASE: CPT

## 2022-11-30 PROCEDURE — 83615 LACTATE (LD) (LDH) ENZYME: CPT

## 2022-11-30 PROCEDURE — 86140 C-REACTIVE PROTEIN: CPT

## 2022-11-30 PROCEDURE — 82550 ASSAY OF CK (CPK): CPT

## 2022-11-30 PROCEDURE — 85652 RBC SED RATE AUTOMATED: CPT

## 2022-11-30 PROCEDURE — 80048 BASIC METABOLIC PNL TOTAL CA: CPT

## 2022-11-30 PROCEDURE — 36415 COLL VENOUS BLD VENIPUNCTURE: CPT

## 2022-11-30 PROCEDURE — 82306 VITAMIN D 25 HYDROXY: CPT

## 2022-11-30 PROCEDURE — 86038 ANTINUCLEAR ANTIBODIES: CPT

## 2022-11-30 PROCEDURE — 80074 ACUTE HEPATITIS PANEL: CPT

## 2022-12-01 LAB — ALDOLASE SERPL-CCNC: 7.2 U/L (ref 3.3–10.3)

## 2022-12-02 ENCOUNTER — TELEPHONE (OUTPATIENT)
Dept: FAMILY MEDICINE CLINIC | Facility: CLINIC | Age: 67
End: 2022-12-02

## 2022-12-02 DIAGNOSIS — L30.9 ECZEMA, UNSPECIFIED TYPE: ICD-10-CM

## 2022-12-02 LAB
ANA SER QL IF: POSITIVE
ANA SPECKLED TITR SER: ABNORMAL {TITER}
Lab: ABNORMAL

## 2022-12-02 NOTE — TELEPHONE ENCOUNTER
Caller: Poncho Hernandez    Relationship: Self    Best call back number: [phone]    What medication are you requesting:   TRIAMCINOLONE 80G TUBE  CARVEDILOL 60G TUBE    What are your current symptoms: RASH    Have you had these symptoms before:    [x] Yes  [] No    Have you been treated for these symptoms before:   [x] Yes  [] No    If a prescription is needed, what is your preferred pharmacy and phone number: [id number] - [place], [place] - [address] AT [facility] [phone] [facility] [phone]

## 2022-12-04 RX ORDER — TRIAMCINOLONE ACETONIDE 1 MG/G
CREAM TOPICAL 2 TIMES DAILY
Qty: 80 G | Refills: 1 | Status: SHIPPED | OUTPATIENT
Start: 2022-12-04 | End: 2023-01-13

## 2022-12-16 ENCOUNTER — TELEPHONE (OUTPATIENT)
Dept: SURGERY | Facility: CLINIC | Age: 67
End: 2022-12-16

## 2022-12-16 NOTE — TELEPHONE ENCOUNTER
Patient wants to reschedule his colonoscopy that is on 12/23/22.    He said to leave a message if he doesn't answer.

## 2023-01-11 ENCOUNTER — LAB (OUTPATIENT)
Dept: LAB | Facility: HOSPITAL | Age: 68
End: 2023-01-11
Payer: MEDICARE

## 2023-01-11 DIAGNOSIS — R97.20 ELEVATED PROSTATE SPECIFIC ANTIGEN (PSA): ICD-10-CM

## 2023-01-11 DIAGNOSIS — N13.8 BPH WITH OBSTRUCTION/LOWER URINARY TRACT SYMPTOMS: ICD-10-CM

## 2023-01-11 DIAGNOSIS — N40.1 BPH WITH OBSTRUCTION/LOWER URINARY TRACT SYMPTOMS: ICD-10-CM

## 2023-01-11 LAB — PSA SERPL-MCNC: 6.93 NG/ML (ref 0–4)

## 2023-01-11 PROCEDURE — 36415 COLL VENOUS BLD VENIPUNCTURE: CPT

## 2023-01-11 PROCEDURE — 84153 ASSAY OF PSA TOTAL: CPT

## 2023-01-13 ENCOUNTER — OFFICE VISIT (OUTPATIENT)
Dept: UROLOGY | Facility: CLINIC | Age: 68
End: 2023-01-13
Payer: MEDICARE

## 2023-01-13 VITALS — HEIGHT: 75 IN | BODY MASS INDEX: 28.7 KG/M2 | WEIGHT: 230.8 LBS

## 2023-01-13 DIAGNOSIS — N40.1 BPH WITH OBSTRUCTION/LOWER URINARY TRACT SYMPTOMS: ICD-10-CM

## 2023-01-13 DIAGNOSIS — N13.8 BPH WITH OBSTRUCTION/LOWER URINARY TRACT SYMPTOMS: ICD-10-CM

## 2023-01-13 DIAGNOSIS — R97.20 ELEVATED PROSTATE SPECIFIC ANTIGEN (PSA): Primary | ICD-10-CM

## 2023-01-13 LAB
BILIRUB BLD-MCNC: NEGATIVE MG/DL
CLARITY, POC: CLEAR
COLOR UR: YELLOW
EXPIRATION DATE: NORMAL
GLUCOSE UR STRIP-MCNC: NEGATIVE MG/DL
KETONES UR QL: NEGATIVE
LEUKOCYTE EST, POC: NEGATIVE
Lab: NORMAL
NITRITE UR-MCNC: NEGATIVE MG/ML
PH UR: 5 [PH] (ref 5–8)
PROT UR STRIP-MCNC: NEGATIVE MG/DL
RBC # UR STRIP: NEGATIVE /UL
SP GR UR: 1.03 (ref 1–1.03)
UROBILINOGEN UR QL: NORMAL

## 2023-01-13 PROCEDURE — 99213 OFFICE O/P EST LOW 20 MIN: CPT | Performed by: UROLOGY

## 2023-01-13 PROCEDURE — 81003 URINALYSIS AUTO W/O SCOPE: CPT | Performed by: UROLOGY

## 2023-01-13 RX ORDER — CLOBETASOL PROPIONATE 0.5 MG/G
OINTMENT TOPICAL
COMMUNITY
Start: 2022-12-09

## 2023-01-13 NOTE — PROGRESS NOTES
"Chief Complaint  Elevated PSA    Subjective          Poncho Hernandez presents to [facility]  History of Present Illness  His most recent PSA is 6.930 in January 2023.  It was 6.10 in August 2021.       PSA in October 2019 was 5.59.  This is actually down from his most recent PSA prior which was 6.68.  His PSA has been as high as 9.  He had a negative prostate biopsy in 2011.  It was 5.9 in August 2020.      He had a button TURP in May 2018.  His PVR today is 0 cc.  He is emptying much better.  Less frequency and urgency, especially during the day.  Nocturia a few times a night, some nights are worse than other.  He states his quality of life is greatly improved but he does get frustrated on the nights he has to get up more.  It has gotten a little worse over the past few months and he is having more nocturia with urgency.  He states his stream is still really good despite the urgency and nocturia.       He was recently diagnosed with low T and has started on depo-testosterone about a year ago.        Past uro history   -----------------------------     He has had an up and down elevated PSA for years with a negative prostate biopsy in 2011.  At that time his PSA was 5.7.  It had been as high as 6.98 in 2014.  It was 3.84 in Dec 2018 and then 9.10 in Feb 2019 in a repeat check.  It was 6.68 in April 2019 after Bactrim for 4 weeks.  He also had another round of Cipro for a UTI.       He had a button TURP that was unremarkable in 2018.     Pelvic ultrasound in 2017 revealed a 5 x 6 x 4 cm prostate.       Objective   Vital Signs:   Ht 190.5 cm (75\")   Wt 105 kg (230 lb 12.8 oz)   BMI 28.85 kg/m²       Physical Exam  Vitals and nursing note reviewed.   Constitutional:       Appearance: Normal appearance. He is well-developed.   Pulmonary:      Effort: Pulmonary effort is normal.      Breath sounds: Normal air entry.   Neurological:      Mental Status: He is alert and oriented to person, " place, and time.      Motor: Motor function is intact.   Psychiatric:         Mood and Affect: Mood normal.         Behavior: Behavior normal.          Result Review :       Results for orders placed or performed in visit on 01/13/23   POC Urinalysis Dipstick, Automated    Specimen: Urine   Result Value Ref Range    Color Yellow Yellow, Straw, Dark Yellow, Virginie    Clarity, UA Clear Clear    Specific Gravity  1.030 1.005 - 1.030    pH, Urine 5.0 5.0 - 8.0    Leukocytes Negative Negative    Nitrite, UA Negative Negative    Protein, POC Negative Negative mg/dL    Glucose, UA Negative Negative mg/dL    Ketones, UA Negative Negative    Urobilinogen, UA 0.2 E.U./dL Normal, 0.2 E.U./dL    Bilirubin Negative Negative    Blood, UA Negative Negative    Lot Number [id number]     Expiration Date 22,024        PSA    PSA 1/11/23   PSA 6.930 (A)   (A) Abnormal value                   Assessment and Plan    Diagnoses and all orders for this visit:    1. Elevated prostate specific antigen (PSA) (Primary)  -     POC Urinalysis Dipstick, Automated  -     PSA DIAGNOSTIC; Future    2. BPH with obstruction/lower urinary tract symptoms    - Will see him back in 1 year with a PSA prior or sooner if needed.  - He is emptying well and his PSA is stable at 6.        Follow Up       No follow-ups on file.  Patient was given instructions and counseling regarding his condition or for health maintenance advice. Please see specific information pulled into the AVS if appropriate.     Transcribed from ambient dictation for Monica Phillips MD by Velma Morillo.  01/13/23   13:14 EST    Patient or patient representative verbalized consent to the visit recording.  I have personally performed the services described in this document as transcribed by the above individual, and it is both accurate and complete.  Monica Phillips MD  1/13/2023  13:51 EST

## 2023-02-08 ENCOUNTER — TELEPHONE (OUTPATIENT)
Dept: CARDIOLOGY | Facility: CLINIC | Age: 68
End: 2023-02-08
Payer: MEDICARE

## 2023-02-08 NOTE — TELEPHONE ENCOUNTER
Procedure: Colonoscopy     Med Directive: Eliquis    PMH: afib, HTN, thoracic aortic aneurysm    Last Seen: 7/12/22

## 2023-02-09 NOTE — TELEPHONE ENCOUNTER
Called and LVM for patient letting know that he is clear to hold Eliquis 2 days prior to his colonoscopy on 04/07/23.

## 2023-02-23 NOTE — TELEPHONE ENCOUNTER
2/23/2023         RE: Anisa Robles  [address] [facility] [address]        Dear Colleague,    Thank you for referring your patient, Anisa Robles, to the [facility]. Please see a copy of my visit note below.    Video-Visit Details    Type of service:  Video Visit    Video Start Time (time video started): 10:32 am    Video End Time (time video stopped): 10:55 am    Originating Location (pt. Location): Home        Distant Location (provider location): Off-site    Mode of Communication:  Video Conference via [employer]        Recent issues:  Endocrinology followup evaluation  Previous right hip replacement 1/2021, then 2/2021 right leg DVT's and bilateral PE's in 2/2021, then Xarelto use for 3 months.   Subsequent left hip LISA 1/13/22  She recalls previous lab testing at [facility] Fall '22, but results not available         Pituitary:  1997. 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 Tod Loving/neurosurgery  10/16/97 TSS pituitary surgery at [facility]     Devoloped postop diabetes insipidus (DI)  Postop mild persistent elevation with IGF-1 level, normalized with Octreotide treatment  Has taken Somatuline (somatostatin), ddAVP for treatment     Head MRI imaging includes:  2/10/10 MRI:              Postop changes after TSS              No evidence for recurrent tumor  9/24/13 MRI:              Postop changes following pituitary surgery              Enhancing tissues 0.45 x 1.3 x 1.4 cm              No clear evidence of recurrent tumor  12/19/16 MRI:              Postop changes              Enhancing tissues 0.65 x 0.8 x ~1.4 cm     Other imaging:  Pelvis XRays:               Osteochondroma bone changes  5/23/14 Abdominal U/S:              GB unremarkable without cholethiasis or GB wall  Contacted [facility] and they stated that due to the patients insurance being out of network that he cant go here. Is there an alternative place for patient in the [place] area. Please advise.    thickening  3/2010 Colonoscopy:              Polyp removed, benign tissue  3/2015 Colonoscopy:              No polyps reported     ~10/2019. Jody estrogen patch discontinued  Had seen Dr. Tod Izaguirre/[facility]      Previous IGF-1 levels include:  4/5/19  256  10/11/19 308  10/2/20 165  8/27/21 234 ng/mL (nl )    Recent [facility] labs include:  Lab Results   Component Value Date    PROLACTIN 7 10/02/2020    HGH 0.5 10/02/2020    MARCOS 5.5 10/02/2020    TSH 1.15 10/02/2020    T4 0.93 10/02/2020        Current medications include: Somatuline 120 mg subcutaneous injections to hip area, every Q2 mo cycle       Last dose 1/10/23 at [facility]                 ddAVP 1-spray each morning         Thyroid:  History of multinodular goiter  Surgical evaluation at [facility]  7/2005. Left thyroid lobectomy  Path:  Benign adenomas  No evidence for postop hypothyroidism  No previous treatment with thyroid medication    2/24/21 Thyroid U/S:   Right lobe 5.3 x 3.1 x 3 cm and left lobe surgically absent   Several small (<1 cm) nodules throughout right thyroid lobe    12/19/17 labs:  IGF1 210 ng/mL (nl ), GH 0.9 ng/mL (nl <7.1), glucose 93 mg/dl, prolactin 6.0 ng/mL, TSH 1.77 uIU/mL, FT4 0.95 ng/dL  9/27/21 labs ([facility]...): 256 ng/mL (nl )    Recent [facility] labs include:  Lab Results   Component Value Date    TSH 1.15 10/02/2020    T4 0.93 10/02/2020           , lives in [place], 2 children Taj and Olivia.  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 Saw Anderson/[facility] for general medicine evaluations.  Also sees Dr. Lucina Bales and also Lucina NAPOLES/[facility]       PMH/PSH:  Past Medical History:   Diagnosis Date     Acromegaly (H)      Breast lump      Cataract      Diabetes insipidus (H)      DVT (deep venous thrombosis) (H) 02/2021    right leg     Kidney infection      Multiple thyroid nodules      TOBY (obstructive  sleep apnea)      Osteochondroma      Pulmonary embolus (H) 02/2021    bilateral     Past Surgical History:   Procedure Laterality Date     HYSTERECTOMY TOTAL ABDOMINAL  10/2007     PARTIAL THYROIDECTOMY Left 07/2005     PITUITARY SURGERY  10/1997    TSS pituitary surgery       Family Hx:  No family history on file.      Social Hx:  Social History     Socioeconomic History     Marital status:      Spouse name: Not on file     Number of children: Not on file     Years of education: Not on file     Highest education level: Not on file   Occupational History     Not on file   Tobacco Use     Smoking status: Never     Smokeless tobacco: Never   Substance and Sexual Activity     Alcohol use: Yes     Drug use: No     Sexual activity: Not on file   Other Topics Concern     Not on file   Social History Narrative     Not on file     Social Determinants of Health     Financial Resource Strain: Not on file   Food Insecurity: Not on file   Transportation Needs: Not on file   Physical Activity: Not on file   Stress: Not on file   Social Connections: Not on file   Intimate Partner Violence: Not on file   Housing Stability: Not on file          MEDICATIONS:  has a current medication list which includes the following prescription(s): amlodipine, desmopressin, escitalopram, hydrochlorothiazide, multivitamin w/minerals, somatuline depot, cholecalciferol, omega-3 acid ethyl esters, and rivaroxaban anticoagulant.       ROS: 10 point ROS neg other than the symptoms noted above in the HPI.     GENERAL:  fatigue, wt stable; denies fevers, chills, night sweats.    HEENT: no dysphagia, odonophagia, diplopia, neck pain  THYROID:  no apparent hyper or hypothyroid symptoms  CV: no chest pain, pressure, palpitations  LUNGS: no SOB, HOPPER, cough, wheezing   ABDOMEN: no diarrhea, constipation, abdominal pain  EXTREMITIES: right foot/ankle/lower leg edema  NEUROLOGY: no headaches, denies changes in vision, tingling, extremitiy numbness   MSK:  aches at knees and back; denies muscle weakness  SKIN: no rashes or lesions  :  nocturia 2x/night, also ncreased urination if no ddAVP use; no menses since JOANN/BSO 10/2007  PSYCH:  stable mood, no significant anxiety or depression  ENDOCRINE: no heat or cold intolerance    Physical Exam (visual exam)  VS:  no vital signs taken for video visit  CONSTITUTIONAL: healthy, alert and NAD, well dressed, answering questions appropriately  ENT: no nose swelling or nasal discharge, mouth redness or gum changes.  EYES: eyes grossly normal to inspection, conjunctivae and sclerae normal, no exophthalmos or proptosis  THYROID:  no apparent nodules or goiter  LUNGS: no audible wheeze, cough or visible cyanosis, no visible retractions or increased work of breathing  ABDOMEN: abdomen not evaluated  EXTREMITIES: no hand tremors, limited exam  NEUROLOGY: CN grossly intact, mentation intact and speech normal   SKIN:  no apparent skin lesions, rash, or edema with visualized skin appearance  PSYCH: mentation appears normal, affect normal/bright, judgement and insight intact,   normal speech and appearance well groomed       LABS:     All pertinent notes, labs, and images personally reviewed by me.     A/P:  Encounter Diagnoses   Name Primary?     Acromegaly (H) Yes     History of partial thyroidectomy      DI (diabetes insipidus) (H)        Comments:   Reviewed health history, pituitary, and thyroid issues.  Difficult to understand the Somatuline treatment at her OBGYN clinic without medical records  Persistent right ankle area edema despite her repeat DVT workup, per patient  Reviewed and interpreted tests that I previously ordered.   Ordered appropriate tests for the endocrinology disease management.    Management options discussed and implemented after shared medical decision making with the patient.  Acromegaly problem is chronic-stable     Plan:  Discussed general issues with acromegaly diagnosis and management  We have reviewed  pituitary gland anatomy and hormone physiology  Discussed lab tests used to assess patient pituitary-axis hormone levels  We have discussed the previous pituitary MRI imaging procedure  Reviewed treatment options with the somatostatin and ddAVP medication use  Reviewed lab testing to screen for hypopituitarism and hypothyroidism     Recommend:  Reviewed the pituitary disease management plan  Continue the Somatuline 120 mg Q 2 month injection dosing at [facility]   Plan next dose soon   Contact our office if questions about Somatuline dosing or Rx  Continue current ddAVP spray QAM but encouraged change to BID dosing  Needs repeat endocrine lab testing   Recommended nearest [facility] clinic, but she prefers [facility] or [facility]   Lab order forms given to patient, diagnoses and our fax number added  No head MRI imaging needed at this time.  Monitor for symptom changes  Will summarize results and recommendations when lab results available    Keep regular general medicine, orthopedic, and GYN appointments  Needs further evaluation of the leg edema and/or vascular medicine consultation  Addressed patient questions today     There are no Patient Instructions on file for this visit.    Future labs ordered today: No orders of the defined types were placed in this encounter.    Radiology/Consults ordered today: None    Total time spent on day of encounter:  31 min    Follow-up:  6/21/23 at 10am, Return    VARGAS De Luna MD, MS  Endocrinology  [facility]                    Again, thank you for allowing me to participate in the care of your patient.        Sincerely,        Adithya De Luna MD

## 2023-02-25 ENCOUNTER — APPOINTMENT (OUTPATIENT)
Dept: CARDIOLOGY | Facility: HOSPITAL | Age: 68
End: 2023-02-25
Payer: MEDICARE

## 2023-02-25 ENCOUNTER — HOSPITAL ENCOUNTER (EMERGENCY)
Facility: HOSPITAL | Age: 68
Discharge: HOME | End: 2023-02-25
Attending: EMERGENCY MEDICINE | Admitting: EMERGENCY MEDICINE
Payer: MEDICARE

## 2023-02-25 VITALS
SYSTOLIC BLOOD PRESSURE: 147 MMHG | RESPIRATION RATE: 24 BRPM | HEART RATE: 73 BPM | WEIGHT: 233.91 LBS | BODY MASS INDEX: 29.08 KG/M2 | HEIGHT: 75 IN | TEMPERATURE: 97.7 F | DIASTOLIC BLOOD PRESSURE: 108 MMHG | OXYGEN SATURATION: 97 %

## 2023-02-25 DIAGNOSIS — M71.20 SYNOVIAL CYST OF POPLITEAL SPACE, UNSPECIFIED LATERALITY: Primary | ICD-10-CM

## 2023-02-25 LAB
ALBUMIN SERPL-MCNC: 3.9 G/DL (ref 3.5–5.2)
ALBUMIN/GLOB SERPL: 1.4 G/DL
ALP SERPL-CCNC: 63 U/L (ref 39–117)
ALT SERPL W P-5'-P-CCNC: 19 U/L (ref 1–41)
ANION GAP SERPL CALCULATED.3IONS-SCNC: 8.1 MMOL/L (ref 5–15)
AST SERPL-CCNC: 22 U/L (ref 1–40)
BASOPHILS # BLD AUTO: 0.05 10*3/MM3 (ref 0–0.2)
BASOPHILS NFR BLD AUTO: 0.8 % (ref 0–1.5)
BH CV LOW VAS LEFT GREATER SAPH AK VESSEL: 1
BH CV LOW VAS LEFT GREATER SAPH BK VESSEL: 1
BH CV LOWER VASCULAR LEFT COMMON FEMORAL AUGMENT: NORMAL
BH CV LOWER VASCULAR LEFT COMMON FEMORAL COMPETENT: NORMAL
BH CV LOWER VASCULAR LEFT COMMON FEMORAL COMPRESS: NORMAL
BH CV LOWER VASCULAR LEFT COMMON FEMORAL PHASIC: NORMAL
BH CV LOWER VASCULAR LEFT COMMON FEMORAL SPONT: NORMAL
BH CV LOWER VASCULAR LEFT DISTAL FEMORAL COMPRESS: NORMAL
BH CV LOWER VASCULAR LEFT GASTRONEMIUS COMPRESS: NORMAL
BH CV LOWER VASCULAR LEFT LESSER SAPH COMPRESS: NORMAL
BH CV LOWER VASCULAR LEFT MID FEMORAL AUGMENT: NORMAL
BH CV LOWER VASCULAR LEFT MID FEMORAL COMPETENT: NORMAL
BH CV LOWER VASCULAR LEFT MID FEMORAL COMPRESS: NORMAL
BH CV LOWER VASCULAR LEFT MID FEMORAL PHASIC: NORMAL
BH CV LOWER VASCULAR LEFT MID FEMORAL SPONT: NORMAL
BH CV LOWER VASCULAR LEFT PERONEAL COMPRESS: NORMAL
BH CV LOWER VASCULAR LEFT POPLITEAL AUGMENT: NORMAL
BH CV LOWER VASCULAR LEFT POPLITEAL COMPETENT: NORMAL
BH CV LOWER VASCULAR LEFT POPLITEAL COMPRESS: NORMAL
BH CV LOWER VASCULAR LEFT POPLITEAL PHASIC: NORMAL
BH CV LOWER VASCULAR LEFT POPLITEAL SPONT: NORMAL
BH CV LOWER VASCULAR LEFT POSTERIOR TIBIAL COMPRESS: NORMAL
BH CV LOWER VASCULAR LEFT PROXIMAL FEMORAL COMPRESS: NORMAL
BH CV LOWER VASCULAR LEFT SAPHENOFEMORAL JUNCTION COMPRESS: NORMAL
BH CV LOWER VASCULAR RIGHT COMMON FEMORAL AUGMENT: NORMAL
BH CV LOWER VASCULAR RIGHT COMMON FEMORAL COMPETENT: NORMAL
BH CV LOWER VASCULAR RIGHT COMMON FEMORAL COMPRESS: NORMAL
BH CV LOWER VASCULAR RIGHT COMMON FEMORAL PHASIC: NORMAL
BH CV LOWER VASCULAR RIGHT COMMON FEMORAL SPONT: NORMAL
BH CV VAS PRELIMINARY FINDINGS SCRIPTING: 1
BILIRUB SERPL-MCNC: 1.3 MG/DL (ref 0–1.2)
BUN SERPL-MCNC: 23 MG/DL (ref 8–23)
BUN/CREAT SERPL: 26.1 (ref 7–25)
CALCIUM SPEC-SCNC: 9 MG/DL (ref 8.6–10.5)
CHLORIDE SERPL-SCNC: 104 MMOL/L (ref 98–107)
CO2 SERPL-SCNC: 26.9 MMOL/L (ref 22–29)
CREAT SERPL-MCNC: 0.88 MG/DL (ref 0.76–1.27)
DEPRECATED RDW RBC AUTO: 49.1 FL (ref 37–54)
EGFRCR SERPLBLD CKD-EPI 2021: 93.1 ML/MIN/1.73
EOSINOPHIL # BLD AUTO: 0.13 10*3/MM3 (ref 0–0.4)
EOSINOPHIL NFR BLD AUTO: 2.1 % (ref 0.3–6.2)
ERYTHROCYTE [DISTWIDTH] IN BLOOD BY AUTOMATED COUNT: 14.2 % (ref 12.3–15.4)
GLOBULIN UR ELPH-MCNC: 2.7 GM/DL
GLUCOSE SERPL-MCNC: 121 MG/DL (ref 65–99)
HCT VFR BLD AUTO: 38.3 % (ref 37.5–51)
HGB BLD-MCNC: 12.9 G/DL (ref 13–17.7)
IMM GRANULOCYTES # BLD AUTO: 0.02 10*3/MM3 (ref 0–0.05)
IMM GRANULOCYTES NFR BLD AUTO: 0.3 % (ref 0–0.5)
LYMPHOCYTES # BLD AUTO: 1.13 10*3/MM3 (ref 0.7–3.1)
LYMPHOCYTES NFR BLD AUTO: 18.6 % (ref 19.6–45.3)
MAXIMAL PREDICTED HEART RATE: 151 BPM
MCH RBC QN AUTO: 31.9 PG (ref 26.6–33)
MCHC RBC AUTO-ENTMCNC: 33.7 G/DL (ref 31.5–35.7)
MCV RBC AUTO: 94.8 FL (ref 79–97)
MONOCYTES # BLD AUTO: 0.49 10*3/MM3 (ref 0.1–0.9)
MONOCYTES NFR BLD AUTO: 8.1 % (ref 5–12)
NEUTROPHILS NFR BLD AUTO: 4.25 10*3/MM3 (ref 1.7–7)
NEUTROPHILS NFR BLD AUTO: 70.1 % (ref 42.7–76)
NRBC BLD AUTO-RTO: 0 /100 WBC (ref 0–0.2)
PLATELET # BLD AUTO: 210 10*3/MM3 (ref 140–450)
PMV BLD AUTO: 9.6 FL (ref 6–12)
POTASSIUM SERPL-SCNC: 4.5 MMOL/L (ref 3.5–5.2)
PROT SERPL-MCNC: 6.6 G/DL (ref 6–8.5)
RBC # BLD AUTO: 4.04 10*6/MM3 (ref 4.14–5.8)
SODIUM SERPL-SCNC: 139 MMOL/L (ref 136–145)
STRESS TARGET HR: 128 BPM
WBC NRBC COR # BLD: 6.07 10*3/MM3 (ref 3.4–10.8)

## 2023-02-25 PROCEDURE — 99282 EMERGENCY DEPT VISIT SF MDM: CPT

## 2023-02-25 PROCEDURE — 93971 EXTREMITY STUDY: CPT

## 2023-02-25 PROCEDURE — 85025 COMPLETE CBC W/AUTO DIFF WBC: CPT | Performed by: EMERGENCY MEDICINE

## 2023-02-25 PROCEDURE — 36415 COLL VENOUS BLD VENIPUNCTURE: CPT

## 2023-02-25 PROCEDURE — 93971 EXTREMITY STUDY: CPT | Performed by: SURGERY

## 2023-02-25 PROCEDURE — 80053 COMPREHEN METABOLIC PANEL: CPT | Performed by: EMERGENCY MEDICINE

## 2023-03-06 ENCOUNTER — TELEPHONE (OUTPATIENT)
Dept: CARDIOLOGY | Facility: CLINIC | Age: 68
End: 2023-03-06
Payer: MEDICARE

## 2023-03-06 ENCOUNTER — OFFICE VISIT (OUTPATIENT)
Dept: CARDIOLOGY | Facility: CLINIC | Age: 68
End: 2023-03-06
Payer: MEDICARE

## 2023-03-06 VITALS
DIASTOLIC BLOOD PRESSURE: 92 MMHG | WEIGHT: 225 LBS | BODY MASS INDEX: 27.98 KG/M2 | SYSTOLIC BLOOD PRESSURE: 140 MMHG | HEART RATE: 89 BPM | HEIGHT: 75 IN

## 2023-03-06 DIAGNOSIS — I48.0 PAROXYSMAL A-FIB: Primary | ICD-10-CM

## 2023-03-06 DIAGNOSIS — I10 ESSENTIAL HYPERTENSION: ICD-10-CM

## 2023-03-06 DIAGNOSIS — I71.21 ANEURYSM OF ASCENDING AORTA WITHOUT RUPTURE: ICD-10-CM

## 2023-03-06 PROCEDURE — 99214 OFFICE O/P EST MOD 30 MIN: CPT | Performed by: INTERNAL MEDICINE

## 2023-03-06 RX ORDER — AMLODIPINE BESYLATE 2.5 MG/1
2.5 TABLET ORAL DAILY
Qty: 90 TABLET | Refills: 3 | Status: SHIPPED | OUTPATIENT
Start: 2023-03-06

## 2023-03-06 NOTE — PROGRESS NOTES
Chief Complaint  PAF and Hypertension    Subjective    Patient has had stable heart rate issues with no complaints of tachycardia.  He has noticed that his blood pressure on his home logs has been mildly elevated at times in the systolics in the 150s range.  Denies any chest discomfort    Past Medical History:   Diagnosis Date   • Abnormal ECG    • Aftercare following bilateral knee joint replacement surgery 04/03/2018   • Aneurysm (HCC)    • Arrhythmia    • Arthritis    • Bladder disorder    • BPH (benign prostatic hyperplasia)    • Elevated PSA     MANAGED WITHOUT MEDICATIONS, SITUATIONAL   • Forgetfulness    • H/O hernia repair    • Heart disease    • Heart murmur    • High blood pressure    • Hypertension    • Limb swelling    • Low testosterone in male    • Night sweats    • Paroxysmal A-fib (HCC) 8/1/2021   • Prostate disorder    • Sleep apnea          Current Outpatient Medications:   •  clobetasol (TEMOVATE) 0.05 % ointment, APPLY A THIN LAYER TO AFFECTED AREA ON LEFT LEG TWICE DAILY AS NEEDED, Disp: , Rfl:   •  Eliquis 5 MG tablet tablet, Take 1 tablet by mouth 2 (Two) Times a Day., Disp: , Rfl:   •  lisinopril (PRINIVIL,ZESTRIL) 40 MG tablet, Take 1 tablet by mouth Daily., Disp: 90 tablet, Rfl: 3  •  propranolol (INDERAL) 10 MG tablet, Take 1 tablet by mouth 2 (Two) Times a Day., Disp: 180 tablet, Rfl: 1  •  amLODIPine (NORVASC) 2.5 MG tablet, Take 1 tablet by mouth Daily., Disp: 90 tablet, Rfl: 3    There are no discontinued medications.  No Known Allergies     Social History     Tobacco Use   • Smokeless tobacco: Never   Vaping Use   • Vaping Use: Never used   Substance Use Topics   • Alcohol use: Never   • Drug use: Never       Family History   Problem Relation Age of Onset   • Stroke Mother    • Heart disease Mother    • Hypertension Mother    • Osteoporosis Mother    • Stroke Father    • Heart disease Father    • Cancer Father    • Heart attack Father    • Prostate cancer Father 88   • Osteoporosis  "Father    • Arthritis Father    • Arthritis Sister    • Arthritis Brother    • Hypertension Maternal Grandmother    • Colon cancer Other    • Colon cancer Other    • Arthritis Son         Objective     /92   Pulse 89   Ht 190.5 cm (75\")   Wt 102 kg (225 lb)   BMI 28.12 kg/m²       Physical Exam    General Appearance:   · no acute distress  · Alert and oriented x3  HENT:   · lips not cyanotic  · Atraumatic  Neck:  · No jvd   · supple  Respiratory:  · no respiratory distress  · normal breath sounds  · no rales  Cardiovascular:  · Regular rate and rhythm  · no S3, no S4   · no murmur  · no rub  Extremities  · No cyanosis  · lower extremity edema: none    Skin:   · warm, dry  · No rashes      Result Review :     proBNP   Date Value Ref Range Status   07/07/2022 566.0 0.0 - 900.0 pg/mL Final     CMP    CMP 7/7/22 11/30/22 2/25/23   Glucose 121 (A) 121 (A) 121 (A)   BUN 25 (A) 16 23   Creatinine 0.93 1.05 0.88   eGFR 89.4 76.8 93.1   Sodium 138 137 139   Potassium 4.2 3.9 4.5   Chloride 102 104 104   Calcium 8.9 9.2 9.0   Total Protein 6.7  6.6   Albumin 4.20  3.9   Globulin 2.5  2.7   Total Bilirubin 1.2  1.3 (A)   Alkaline Phosphatase 63  63   AST (SGOT) 24  22   ALT (SGPT) 23  19   Albumin/Globulin Ratio 1.7  1.4   BUN/Creatinine Ratio 26.9 (A) 15.2 26.1 (A)   Anion Gap 9.9 5.7 8.1   (A) Abnormal value       Comments are available for some flowsheets but are not being displayed.           CBC w/diff    CBC w/Diff 3/17/22 7/7/22 2/25/23   WBC 7.23 5.22 6.07   RBC 5.55 4.96 4.04 (A)   Hemoglobin 15.9 15.6 12.9 (A)   Hematocrit 49.3 47.2 38.3   MCV 88.8 95.2 94.8   MCH 28.6 31.5 31.9   MCHC 32.3 33.1 33.7   RDW 16.4 (A) 13.2 14.2   Platelets 198 211 210   Neutrophil Rel % 62.9 62.0 70.1   Immature Granulocyte Rel % 0.3 0.2 0.3   Lymphocyte Rel % 23.4 25.1 18.6 (A)   Monocyte Rel % 10.5 9.0 8.1   Eosinophil Rel % 1.9 2.7 2.1   Basophil Rel % 1.0 1.0 0.8   (A) Abnormal value             Lab Results   Component " Value Date    TSH 4.150 01/31/2020      Lab Results   Component Value Date    FREET4 1.3 01/31/2020      No results found for: DDIMERQUANT  No results found for: MG   No results found for: DIGOXIN   No results found for: TROPONINT          No results found for: POCTROP    Results for orders placed in visit on 08/25/22    Adult Transthoracic Echo Complete W/ Cont if Necessary Per Protocol    Interpretation Summary  · Calculated left ventricular EF = 55% Estimated left ventricular EF was in agreement with the calculated left ventricular EF.  · Left atrial dilation mild                 Diagnoses and all orders for this visit:    1. Paroxysmal A-fib (HCC) (Primary)  Assessment & Plan:  Patient symptomatically stable continue on Eliquis 5 twice daily for CVA prevention      2. Aneurysm of ascending aorta without rupture  Assessment & Plan:  Serial CT for continued monitoring stable in size previously blood pressure being optimized      3. Essential hypertension  Assessment & Plan:  Mild systolic elevations recommended addition of amlodipine 2.5 mg once a day.  Counseled patient on  • low-sodium diet of less than 2 g  • Aerobic activity 30 minutes a day 5 times a week  • Weight loss          Other orders  -     amLODIPine (NORVASC) 2.5 MG tablet; Take 1 tablet by mouth Daily.  Dispense: 90 tablet; Refill: 3          Follow Up     Return in about 6 months (around 9/6/2023) for Follow with Neeru Portillo, EKG with F/U.          Patient was given instructions and counseling regarding his condition or for health maintenance advice. Please see specific information pulled into the AVS if appropriate.

## 2023-03-06 NOTE — TELEPHONE ENCOUNTER
----- Message from TON Gomez sent at 3/3/2023 11:19 AM EST -----        ----- Message -----  From: Ashley Sanchez RegSched Rep  Sent: 3/3/2023  11:14 AM EST  To: TON Gomez

## 2023-03-06 NOTE — ASSESSMENT & PLAN NOTE
Mild systolic elevations recommended addition of amlodipine 2.5 mg once a day.  Counseled patient on  • low-sodium diet of less than 2 g  • Aerobic activity 30 minutes a day 5 times a week  • Weight loss

## 2023-03-07 ENCOUNTER — OFFICE VISIT (OUTPATIENT)
Dept: ORTHOPEDIC SURGERY | Facility: CLINIC | Age: 68
End: 2023-03-07
Payer: MEDICARE

## 2023-03-07 VITALS — OXYGEN SATURATION: 96 % | HEIGHT: 75 IN | HEART RATE: 88 BPM | BODY MASS INDEX: 27.35 KG/M2 | WEIGHT: 220 LBS

## 2023-03-07 DIAGNOSIS — M25.462 EFFUSION OF LEFT KNEE: Primary | ICD-10-CM

## 2023-03-07 PROCEDURE — 99213 OFFICE O/P EST LOW 20 MIN: CPT | Performed by: ORTHOPAEDIC SURGERY

## 2023-03-07 RX ORDER — METHYLPREDNISOLONE 4 MG/1
1 TABLET ORAL DAILY
Qty: 21 TABLET | Refills: 0 | Status: ON HOLD | OUTPATIENT
Start: 2023-03-07 | End: 2023-04-07

## 2023-03-07 NOTE — PROGRESS NOTES
"Chief Complaint  Initial Evaluation and Pain of the Left Knee     Subjective      Poncho Hernandez presents to [facility] for initial evaluation of the left knee. He is have swelling in the left knee, foot and ankle.  He has had swelling off and on.  He has tightness in the left calf and then up behind the left knee.  He does exercise frequently.  He has a bakers cyst but negative for DVT.  He went and had some e stim and manipulation and compression stockings and has had good improvement. He is here to find what to do with the swelling and inflammation.     No Known Allergies     Social History     Socioeconomic History   • Marital status:    Tobacco Use   • Smoking status: Never   • Smokeless tobacco: Never   Vaping Use   • Vaping Use: Never used   Substance and Sexual Activity   • Alcohol use: Never   • Drug use: Never   • Sexual activity: Defer        Review of Systems     Objective   Vital Signs:   Pulse 88   Ht 190.5 cm (75\")   Wt 99.8 kg (220 lb)   SpO2 96%   BMI 27.50 kg/m²       Physical Exam  Constitutional:       Appearance: Normal appearance. Patient is well-developed and normal weight.   HENT:      Head: Normocephalic.      Right Ear: Hearing and external ear normal.      Left Ear: Hearing and external ear normal.      Nose: Nose normal.   Eyes:      Conjunctiva/sclera: Conjunctivae normal.   Cardiovascular:      Rate and Rhythm: Normal rate.   Pulmonary:      Effort: Pulmonary effort is normal.      Breath sounds: No wheezing or rales.   Abdominal:      Palpations: Abdomen is soft.      Tenderness: There is no abdominal tenderness.   Musculoskeletal:      Cervical back: Normal range of motion.   Skin:     Findings: No rash.   Neurological:      Mental Status: Patient is alert and oriented to person, place, and time.   Psychiatric:         Mood and Affect: Mood and affect normal.         Judgment: Judgment normal.       Ortho Exam      LEFT KNEE Flexion 125. " Extension 0. Stable to varus/valgus stress. Stable to anterior/posterior drawer. Neurovascularly intact. Negative Vincenzo. Negative Lachman. Positive EHL, FHL, HS and TA. Sensation intact to light touch all 5 nerves of the foot. Ambulates with Antalgic gait. Patella is well tracking. Calf tight, non-tender. Positive tenderness to the medial joint line. Positive tenderness to the lateral joint line. Positive Crepitus. Good strength to hamstrings, quadriceps, dorsiflexors, and plantar flexors.  Knee Extensor Mechanism intact      Procedures      Imaging Results (Most Recent)     None           Result Review :              Assessment and Plan     Diagnoses and all orders for this visit:    1. Effusion of left knee (Primary)        Discussed the treatment plan with the patient. Discussed the risks and benefits of conservative measures.  Prescribed steroid pack.    Call or return if worsening symptoms.    Follow Up     4 weeks.       Patient was given instructions and counseling regarding his condition or for health maintenance advice. Please see specific information pulled into the AVS if appropriate.     Scribed for Adi Renteria MD by Farnaz Guerrero MA.  03/07/23   15:22 EST    I have personally performed the services described in this document as scribed by the above individual and it is both accurate and complete. Adi Renteria MD 03/07/23

## 2023-03-13 ENCOUNTER — TELEPHONE (OUTPATIENT)
Dept: FAMILY MEDICINE CLINIC | Facility: CLINIC | Age: 68
End: 2023-03-13
Payer: MEDICARE

## 2023-03-13 NOTE — TELEPHONE ENCOUNTER
PATIENT IS HAVING EXTREME PAIN IN HIS RT HIP, WANTING TO KNOW IF HE CAN GET AN XRAY REFERRAL. PATIENT HAS UPCOMING APPOINTMENT FOR HIP PAIN, BUT STATES HE CAN NOT WAIT THAT LONG. PLEASE ADVISE PATIENT.

## 2023-03-15 NOTE — TELEPHONE ENCOUNTER
It looks like the pt went to urgent care on the 13th, the same day the message came in to our office.

## 2023-03-16 ENCOUNTER — OFFICE VISIT (OUTPATIENT)
Dept: ORTHOPEDIC SURGERY | Facility: CLINIC | Age: 68
End: 2023-03-16
Payer: MEDICARE

## 2023-03-16 VITALS — BODY MASS INDEX: 27.35 KG/M2 | WEIGHT: 220 LBS | HEIGHT: 75 IN

## 2023-03-16 DIAGNOSIS — G89.29 CHRONIC LEFT SI JOINT PAIN: Primary | ICD-10-CM

## 2023-03-16 DIAGNOSIS — M53.3 CHRONIC LEFT SI JOINT PAIN: Primary | ICD-10-CM

## 2023-03-16 DIAGNOSIS — G57.01 PIRIFORMIS SYNDROME OF RIGHT SIDE: ICD-10-CM

## 2023-03-16 PROCEDURE — 99213 OFFICE O/P EST LOW 20 MIN: CPT | Performed by: ORTHOPAEDIC SURGERY

## 2023-03-16 RX ORDER — TRAMADOL HYDROCHLORIDE 50 MG/1
50 TABLET ORAL EVERY 8 HOURS PRN
Qty: 21 TABLET | Refills: 0 | Status: SHIPPED | OUTPATIENT
Start: 2023-03-16
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
## 2023-03-20 ENCOUNTER — TREATMENT (OUTPATIENT)
Dept: PHYSICAL THERAPY | Facility: CLINIC | Age: 68
End: 2023-03-20
Payer: MEDICARE

## 2023-03-20 DIAGNOSIS — R29.898 WEAKNESS OF RIGHT HIP: ICD-10-CM

## 2023-03-20 DIAGNOSIS — M25.551 RIGHT HIP PAIN: Primary | ICD-10-CM

## 2023-03-20 DIAGNOSIS — R26.9 GAIT DISTURBANCE: ICD-10-CM

## 2023-03-20 PROCEDURE — 97161 PT EVAL LOW COMPLEX 20 MIN: CPT | Performed by: PHYSICAL THERAPIST

## 2023-03-20 PROCEDURE — 97110 THERAPEUTIC EXERCISES: CPT | Performed by: PHYSICAL THERAPIST

## 2023-03-20 NOTE — PROGRESS NOTES
Physical Therapy Initial Evaluation and Plan of Care  [address] [place] [id number]    Patient: Poncho Hernandez   : 1955  Diagnosis/ICD-10 Code:  Right hip pain [M25.551]  Referring practitioner: Adi Renteria MD  Date of Initial Visit: 3/20/2023  Today's Date: 3/20/2023  Patient seen for 1 sessions           Subjective Questionnaire: LEFS: 38/80 - 40-59%      Subjective Evaluation    History of Present Illness  Mechanism of injury: Pt presents to PT with R hip pain that started two weeks ago when patient was cutting up wood. Pt reports he was trying to go very quickly and reports since then his R hip pain has been significant. Pt reports he went to UC and ED for hip pain of which patient received Toradol injection, lidocaine patches and another steroid pack. Pt reports today his hip pain is much better compared to last week. Pt reports last week he was having to use a cane for ambulation, but today was able to ambulate in clinic with no cane. Pt did have x-ray revealing mild arthritis in R hip. Pt reports he still has increase in pain with WB on R LE. Pt reports he did fall yesterday due to the weakness in R LE. Pt reports in the last 6 months he has had 2 major falls and several smaller falls. Pt does exercise regularly and would like to return to being able to attend his exercise classes. Pt has a h/o of bilateral knee replacement in 2018.      Patient Occupation: Retired Pain  Current pain rating: 3  At best pain ratin  At worst pain rating: 10  Quality: discomfort  Relieving factors: medications  Aggravating factors: standing, squatting, ambulation, movement and stairs    Treatments  Previous treatment: injection treatment and medication  Patient Goals  Patient goals for therapy: decreased pain, increased motion, increased strength, independence with ADLs/IADLs and return to sport/leisure activities  Patient goal: To be able to go back to the gym and rebuild muscles            Objective          Palpation     Right Tenderness of the gluteus medius and piriformis.     Tenderness     Additional Tenderness Details  No increase in pain on bony prominences     Neurological Testing     Sensation     Hip   Left Hip   Intact: light touch    Right Hip   Intact: light touch    Active Range of Motion   Left Hip   Flexion: WFL  Abduction: WFL  External rotation (90/90): WFL  Internal rotation (90/90): WFL    Right Hip   Flexion: WFL  Abduction: WFL  External rotation (90/90): WFL  Internal rotation (90/90): WFL    Strength/Myotome Testing     Left Hip   Planes of Motion   Flexion: 5  Extension: 4  Abduction: 4+  Adduction: 4+  External rotation: 4+  Internal rotation: 4+    Right Hip   Planes of Motion   Flexion: 3+  Extension: 3+  Abduction: 4-  Adduction: 4-  External rotation: 4-  Internal rotation: 4-    Left Knee   Flexion: 5  Extension: 5    Right Knee   Flexion: 5  Extension: 5    Additional Strength Details  Significant increase in pain with resisted hip flexion    Tests     Right Hip   Negative DILIP, FADIR, Marvin and scour.     Right Hip Flexibility Comments:   Decrease in flexibility in R piriformis    Ambulation     Observational Gait     Additional Observational Gait Details  Pt ambulates with very forward flexed posture, decrease in stance time on R LE, and decrease in hip extension on R LE in stance phase.      General Comments     Hip Comments   Screen lumbar spine and no increase in pain with PA's to lumbar spine or palpation to lumbar spine.        See Exercise, Manual, and Modality Logs for complete treatment.       Assessment & Plan     Assessment  Impairments: abnormal gait, activity intolerance, impaired balance, impaired physical strength, lacks appropriate home exercise program, pain with function and weight-bearing intolerance  Functional Limitations: lifting, walking, uncomfortable because of pain and standing  Assessment details: Pt presents to PT with R hip pain with  signs and symptoms consistent with piriformis syndrome. Pt has decrease in strength in R hip, decrease in flexibility in R piriformis, increase in pain with palpation to R piriformis and R glute med, and antalgic gait. Pt requires skilled PT in order to address the deficits listed in order to return patient back to maximum function. Initiated patient's HEP and patient tolerated well.   Prognosis: good    Goals  Plan Goals: HIP PROBLEMS:    1. The patient complains of right hip pain.    LTG 1: 12 weeks: The patient will report a pain rating of 1/10 or better in order to improve tolerance to activities of daily living and improve sleep quality.    STATUS: New    STG 1a: 6 weeks: The patient will report a pain rating of 3/10 or better.    STATUS: New    2. The patient demonstrates weakness of the right hip.    LTG 2: 12 weeks: The patient will demonstrate 5/5 strength for right hip flexion, abduction, and extension in order to improve hip stability.    STATUS: New    STG 2a: 6 weeks: The patient will demonstrate 4+/5 strength for right hip flexion, abduction, and extension.    STATUS: New    3. The patient has gait dysfunction.    LTG 3: 12 weeks: The patient will ambulate without assistive device, independently, for community distances with minimal limp to the right lower extremity in order to improve mobility and allow patient to perform activities such as grocery shopping with greater ease.    STATUS: New    STG 3a: The patient will be independent in HEP.    STATUS: New    Mobility: Walking/Moving Around Functional Limitation    LTG 4: 12 weeks: The patient will demonstrate 1-19% limitation by achieving a score of 65 on the Lower Extremity Functional Scale.    STATUS: New    STG 4a: 6 weeks: The patient will demonstrate 20-39% limitation by achieving a score of 48 on the Lower Extremity Functional Scale.    STATUS: New    Plan  Therapy options: will be seen for skilled therapy services  Planned modality  interventions: cryotherapy, TENS, electrical stimulation/Russian stimulation and thermotherapy (hydrocollator packs)  Planned therapy interventions: manual therapy, neuromuscular re-education, ADL retraining, balance/weight-bearing training, flexibility, functional ROM exercises, gait training, home exercise program, joint mobilization, soft tissue mobilization, strengthening, stretching, therapeutic activities, abdominal trunk stabilization, postural training and spinal/joint mobilization  Frequency: 2x week  Duration in weeks: 12  Treatment plan discussed with: patient        History # of Personal Factors and/or Comorbidities: LOW (0)  Examination of Body System(s): # of elements: LOW (1-2)  Clinical Presentation: STABLE   Clinical Decision Making: LOW       Timed:         Manual Therapy:    0     mins  77751;     Therapeutic Exercise:    8     mins  16416;     Neuromuscular Freda:    0    mins  96008;    Therapeutic Activity:     0     mins  38972;     Gait Trainin     mins  44354;     Ultrasound:     0     mins  31988;    Ionto                               0    mins   68404  Self pay                         0     mins [id number]    Un-Timed:  Electrical Stimulation:    0     mins  50454 (MC )  Traction     0     mins 27378  Low Eval     45     Mins  57453  Mod Eval     0     Mins  61246  High Eval                       0     Mins  32086  Self Pay Eval                 0     PTSP1   Re-Eval                           0    mins  77825        Timed Treatment:   8   mins   Total Treatment:     53   mins    PT SIGNATURE: Electronically signed by Ryan Ybarra PT  [place] LICENSE: [id number]    DATE TREATMENT INITIATED: 3/20/2023    Initial Certification  Certification Period: 3/20/2023 thru 2023  I certify that the therapy services are furnished while this patient is under my care.  The services outlined above are required by this patient, and will be reviewed every 90 days.     PHYSICIAN: Adi Renteria  MD JOSE   NPI: [id number]      DATE:     Please sign and return via fax to [phone] Thank you, [facility].

## 2023-03-24 ENCOUNTER — TREATMENT (OUTPATIENT)
Dept: PHYSICAL THERAPY | Facility: CLINIC | Age: 68
End: 2023-03-24
Payer: MEDICARE

## 2023-03-24 DIAGNOSIS — R29.898 LEFT HAND WEAKNESS: ICD-10-CM

## 2023-03-24 DIAGNOSIS — R26.9 GAIT DISTURBANCE: ICD-10-CM

## 2023-03-24 DIAGNOSIS — M25.551 RIGHT HIP PAIN: Primary | ICD-10-CM

## 2023-03-24 DIAGNOSIS — R29.898 WEAKNESS OF RIGHT HIP: ICD-10-CM

## 2023-03-24 PROCEDURE — 97140 MANUAL THERAPY 1/> REGIONS: CPT | Performed by: PHYSICAL THERAPIST

## 2023-03-24 PROCEDURE — 97110 THERAPEUTIC EXERCISES: CPT | Performed by: PHYSICAL THERAPIST

## 2023-03-24 NOTE — PROGRESS NOTES
Outpatient Physical Therapy  [address] [facility], [place] [address]                            Physical Therapy Daily Treatment Note    Patient: Poncho Hernandez   : 1955  Diagnosis/ICD-10 Code:  Right hip pain [M25.551]  Referring practitioner: Adi Renteria MD  Date of Initial Visit: Type: THERAPY  Noted: 3/20/2023  Today's Date: 3/24/2023  Patient seen for 2 sessions             Subjective   Poncho Hernandez reports: that the right hip is really bothering him today, he did fall going up the stairs at his son's house the other day because his right quad wasn't strong enough. Unable to lay on right side at night for very long    Objective   Minimal discomfort in R hip with standing hip strengthening exercises.    See Exercise, Manual, and Modality Logs for complete treatment.     Assessment/Plan  Poncho still experiencing increased R hip pain. Pt had some increased discomfort with standing hip strengthening exercises. Pt would benefit from skilled PT to address Range of Motion  and Strength deficits, pain management and any concerns with ADLs.       Progress per Plan of Care         Timed:  Manual Therapy:    10     mins  15990;  Therapeutic Exercise:    20     mins  15570;     Neuromuscular Freda:        mins  75375;    Therapeutic Activity:          mins  89214;     Gait Training:           mins  79833;    Aquatic Therapy:          mins  30152;       Untimed:  Electrical Stimulation:         mins  24883 ( );  Mechanical Traction:         mins  73812;       Timed Treatment:   30   mins   Total Treatment:     30   mins      Electronically signed:   Hope Schmidt PTA  Physical Therapist Assistant  [facility] License #: [id number]

## 2023-03-27 ENCOUNTER — TREATMENT (OUTPATIENT)
Dept: PHYSICAL THERAPY | Facility: CLINIC | Age: 68
End: 2023-03-27
Payer: MEDICARE

## 2023-03-27 DIAGNOSIS — R26.9 GAIT DISTURBANCE: ICD-10-CM

## 2023-03-27 DIAGNOSIS — M25.551 RIGHT HIP PAIN: Primary | ICD-10-CM

## 2023-03-27 DIAGNOSIS — R29.898 LEFT HAND WEAKNESS: ICD-10-CM

## 2023-03-27 DIAGNOSIS — R29.898 WEAKNESS OF RIGHT HIP: ICD-10-CM

## 2023-03-27 PROCEDURE — 97110 THERAPEUTIC EXERCISES: CPT | Performed by: PHYSICAL THERAPIST

## 2023-03-27 PROCEDURE — 97530 THERAPEUTIC ACTIVITIES: CPT | Performed by: PHYSICAL THERAPIST

## 2023-03-27 NOTE — PROGRESS NOTES
Outpatient Physical Therapy  [address] [facility], [place] [address]                            Physical Therapy Daily Treatment Note    Patient: Poncho Hernandez   : 1955  Diagnosis/ICD-10 Code:  Right hip pain [M25.551]  Referring practitioner: Adi Renteria MD  Date of Initial Visit: Type: THERAPY  Noted: 3/20/2023  Today's Date: 3/27/2023  Patient seen for 3 sessions             Subjective   Poncho Hernandez reports: that the muscle pain of the hip has improved, states that he has a sciatic nerve type pain now. Reports still doing the stretches at home and feels like he's able to do more.     Objective   Improve stance time on RLE with step ups and standing hip strengthening activities.    See Exercise, Manual, and Modality Logs for complete treatment.     Assessment/Plan  Poncho progressing as evident by decreased overall R hip pain, although some sciatic nerve pain. Pt tolerated exercises well, no complaints of increased pain or discomfort. Pt would benefit from skilled PT to address Range of Motion  and Strength deficits, pain management and any concerns with ADLs.         Progress per Plan of Care         Timed:  Manual Therapy:         mins  10525;  Therapeutic Exercise:    15     mins  28778;     Neuromuscular Freda:        mins  08614;    Therapeutic Activity:     15     mins  64063;     Gait Training:           mins  20667;    Aquatic Therapy:          mins  36858;       Untimed:  Electrical Stimulation:         mins  60245 ( );  Mechanical Traction:         mins  59420;       Timed Treatment:   30   mins   Total Treatment:     30   mins      Electronically signed:   Hope Schmidt PTA  Physical Therapist Assistant  [facility] License #: [id number]

## 2023-03-28 ENCOUNTER — OFFICE VISIT (OUTPATIENT)
Dept: ORTHOPEDIC SURGERY | Facility: CLINIC | Age: 68
End: 2023-03-28
Payer: MEDICARE

## 2023-03-28 VITALS — HEIGHT: 75 IN | WEIGHT: 220 LBS | BODY MASS INDEX: 27.35 KG/M2

## 2023-03-28 DIAGNOSIS — G89.29 CHRONIC LEFT SI JOINT PAIN: Primary | ICD-10-CM

## 2023-03-28 DIAGNOSIS — M53.3 CHRONIC LEFT SI JOINT PAIN: Primary | ICD-10-CM

## 2023-03-28 RX ORDER — TRAMADOL HYDROCHLORIDE 50 MG/1
50 TABLET ORAL EVERY 4 HOURS PRN
Qty: 15 TABLET | Refills: 0 | Status: SHIPPED | OUTPATIENT
Start: 2023-03-28

## 2023-03-28 RX ORDER — IBUPROFEN 800 MG/1
800 TABLET ORAL 3 TIMES DAILY
Qty: 90 TABLET | Refills: 0 | Status: SHIPPED | OUTPATIENT
Start: 2023-03-28

## 2023-03-28 RX ORDER — TIZANIDINE 4 MG/1
TABLET ORAL
COMMUNITY
Start: 2023-03-16

## 2023-03-28 NOTE — PROGRESS NOTES
"Chief Complaint  Pain and Follow-up of the Right Hip     Subjective      Poncho Hernandez presents to [facility] for follow up of the right hip. He states his hip is better then it had been.  He does not have to use the assisted device at this time.  He has problems if ambulates to fast.  He states he can do yard work and decrease strength with bending and squatting and pushing off with the right side. He is in physical therapy and notes he is doing better.  He had a recent instance where he fell backwards going up stairs with leading with the right leg and it did not have enough strength to lift him up.     No Known Allergies     Social History     Socioeconomic History   • Marital status:    Tobacco Use   • Smoking status: Never   • Smokeless tobacco: Never   Vaping Use   • Vaping Use: Never used   Substance and Sexual Activity   • Alcohol use: Never   • Drug use: Never   • Sexual activity: Defer        Review of Systems     Objective   Vital Signs:   Ht 190.5 cm (75\")   Wt 99.8 kg (220 lb)   BMI 27.50 kg/m²       Physical Exam  Constitutional:       Appearance: Normal appearance. Patient is well-developed and normal weight.   HENT:      Head: Normocephalic.      Right Ear: Hearing and external ear normal.      Left Ear: Hearing and external ear normal.      Nose: Nose normal.   Eyes:      Conjunctiva/sclera: Conjunctivae normal.   Cardiovascular:      Rate and Rhythm: Normal rate.   Pulmonary:      Effort: Pulmonary effort is normal.      Breath sounds: No wheezing or rales.   Abdominal:      Palpations: Abdomen is soft.      Tenderness: There is no abdominal tenderness.   Musculoskeletal:      Cervical back: Normal range of motion.   Skin:     Findings: No rash.   Neurological:      Mental Status: Patient is alert and oriented to person, place, and time.   Psychiatric:         Mood and Affect: Mood and affect normal.         Judgment: Judgment normal.       Ortho Exam  "     RIGHT HIP Hip Flexion 110. Internal rotation 50. External rotation 50. No skin discoloration, atrophy or swelling. Positive EHL, FHL, GS, and TA. Sensation intact to all 5 nerves of the foot. Positive straight leg raise. Leg lengths appear equal. Ambulates with Antalgic gait Negative Marvin. Negative Young. Good strength to hamstrings, quadriceps, dorsiflexors, and plantar flexors. Knee Extensor Mechanism  intact       Procedures      Imaging Results (Most Recent)     None           Result Review :         XR Hips Bilateral With or Without Pelvis 3-4 View    Result Date: 3/13/2023  Narrative: PROCEDURE: XR HIPS BILATERAL W OR WO PELVIS 3-4 VIEW  COMPARISON: None  INDICATIONS: 67M with right hip pain. Full ROM, however pain when resting or with weight bearing. Minimal pain to left hip. Please evaluate  FINDINGS:  The pelvic ring is intact.  The SI joints are patent and symmetric.  The pubic symphysis is patent.  There is mild hip joint space narrowing in each hip.  The femoral head and neck contours are preserved.  No subchondral lucencies.  No acute fractures or dislocations.  No aggressive appearing focal osseous lesions.  There are small osteophytes along the superior and inferior acetabulum bilaterally.  There are degenerative changes in the visualized lower lumbar spine.  Large amount of stool is present overlying the sacrum and coccyx.      Impression:  Mild degenerative change in each hip with mild joint space narrowing and small acetabular osteophytes.  No acute bony abnormality.  There are degenerative changes in the visualized lower lumbar spine.      KUSUM LONG DO       Electronically Signed and Approved By: KUSUM LONG DO on 3/13/2023 at 11:34                      Assessment and Plan     Diagnoses and all orders for this visit:    1. Right SI joint pain (Primary)        Discussed the treatment plan with the patient. Continue physical therapy.  Prescribed tramadol.      Call or return if worsening  symptoms.    Follow Up     PRN      Patient was given instructions and counseling regarding his condition or for health maintenance advice. Please see specific information pulled into the AVS if appropriate.     Scribed for Adi Renteria MD by Farnaz Guerrero MA.  03/28/23   15:07 EDT    I have personally performed the services described in this document as scribed by the above individual and it is both accurate and complete. Adi Renteria MD 03/30/23

## 2023-03-31 ENCOUNTER — TREATMENT (OUTPATIENT)
Dept: PHYSICAL THERAPY | Facility: CLINIC | Age: 68
End: 2023-03-31
Payer: MEDICARE

## 2023-03-31 DIAGNOSIS — R29.898 WEAKNESS OF RIGHT HIP: ICD-10-CM

## 2023-03-31 DIAGNOSIS — R26.9 GAIT DISTURBANCE: ICD-10-CM

## 2023-03-31 DIAGNOSIS — M25.551 RIGHT HIP PAIN: Primary | ICD-10-CM

## 2023-03-31 PROCEDURE — 97530 THERAPEUTIC ACTIVITIES: CPT | Performed by: PHYSICAL THERAPIST

## 2023-03-31 PROCEDURE — 97110 THERAPEUTIC EXERCISES: CPT | Performed by: PHYSICAL THERAPIST

## 2023-03-31 NOTE — PROGRESS NOTES
Outpatient Physical Therapy  [address] [facility], [place] [address]                            Physical Therapy Daily Treatment Note    Patient: Poncho Hernandez   : 1955  Diagnosis/ICD-10 Code:  Right hip pain [M25.551]  Referring practitioner: Adi Renteria MD  Date of Initial Visit: Type: THERAPY  Noted: 3/20/2023  Today's Date: 3/31/2023  Patient seen for 4 sessions             Subjective   Poncho Hernandez reports: that overall the hip is feeling better, [facility] and worked on stretching the Hamstring and the sciatic nerve pain has gotten much better.     Objective   Increased tolerance of PT exercises, even able to perform balance activities without UE support.     See Exercise, Manual, and Modality Logs for complete treatment.     Assessment/Plan  Poncho progressing as evident by decreased overall hip pain. Pt tolerated exercises well, no complaints of increased pain or discomfort. Pt would benefit from skilled PT to address Range of Motion  and Strength deficits, pain management and any concerns with ADLs.         Progress per Plan of Care         Timed:  Manual Therapy:         mins  43130;  Therapeutic Exercise:    20     mins  56074;     Neuromuscular Freda:        mins  92298;    Therapeutic Activity:     10     mins  36972;     Gait Training:           mins  56674;    Aquatic Therapy:          mins  20480;       Untimed:  Electrical Stimulation:         mins  51808 ( );  Mechanical Traction:         mins  13079;       Timed Treatment:   30   mins   Total Treatment:     30   mins      Electronically signed:   Hope Schmidt PTA  Physical Therapist Assistant  [facility] License #: [id number]

## 2023-04-03 ENCOUNTER — TREATMENT (OUTPATIENT)
Dept: PHYSICAL THERAPY | Facility: CLINIC | Age: 68
End: 2023-04-03
Payer: MEDICARE

## 2023-04-03 DIAGNOSIS — R26.9 GAIT DISTURBANCE: ICD-10-CM

## 2023-04-03 DIAGNOSIS — M25.551 RIGHT HIP PAIN: Primary | ICD-10-CM

## 2023-04-03 DIAGNOSIS — R29.898 WEAKNESS OF RIGHT HIP: ICD-10-CM

## 2023-04-03 PROCEDURE — 97110 THERAPEUTIC EXERCISES: CPT

## 2023-04-03 PROCEDURE — 97530 THERAPEUTIC ACTIVITIES: CPT

## 2023-04-03 NOTE — PROGRESS NOTES
Outpatient Physical Therapy  [address] [facility], [place] [address]                            Physical Therapy Daily Treatment Note    Patient: Poncho Hernandez   : 1955  Diagnosis/ICD-10 Code:  Right hip pain [M25.551]  Referring practitioner: Adi Renteria MD  Date of Initial Visit: Type: THERAPY  Noted: 3/20/2023  Today's Date: 4/3/2023  Patient seen for 5 sessions             Subjective   Poncho Hernandez reports: hip doing much better, even sciatic pain improving. States that its a good healthy muscle soreness versus a sharp pain.     Objective   No complaints of increased pain or discomfort.    See Exercise, Manual, and Modality Logs for complete treatment.     Assessment/Plan  Poncho progressing as evident by decreased overall hip pain. Pt tolerated exercises well, no complaints of increased pain or discomfort. Pt would benefit from skilled PT to address Range of Motion  and Strength deficits, pain management and any concerns with ADLs.         Progress per Plan of Care         Timed:  Manual Therapy:         mins  10623;  Therapeutic Exercise:    20     mins  53640;     Neuromuscular Freda:        mins  80870;    Therapeutic Activity:     10     mins  39511;     Gait Training:           mins  55429;    Aquatic Therapy:          mins  06375;       Untimed:  Electrical Stimulation:         mins  05684 ( );  Mechanical Traction:         mins  05457;       Timed Treatment:   30   mins   Total Treatment:     30   mins      Electronically signed:   Hope Schmidt PTA  Physical Therapist Assistant  [facility] License #: [id number]

## 2023-04-05 NOTE — PRE-PROCEDURE INSTRUCTIONS
Second attempt at PAT call.  Left messages on both numbers indicated in chart.    Left detailed message with a call-back number and the arrival-time of 0630.      Hold all medications the morning of the procedure.  Instructed to bring all medications and any inhalers to the hospital on the day of the procedure.      Take any nebulizer treatments on the morning of the procedure as well.    Instructed that a  will be needed for post-procedure transport home.    Ashleigh Marie RN     4/5 @ 1550    Arrival-time of 0630.    Must have a  for transportation home post-procedure.    Has instructions from Dr. Damico's office on diet and bowel prep.  Has no questions at this time.  States he will be clear using only 4 of the laxative tablets.  Instructed to follow office instructions if possible.    Do not take any morning medications on the day of the procedure.  Instead bring all prescribed medications to the hospital on the morning of the procedure.     Confirmed it has been several weeks since his last dose of blood thinner due to other health issues.    Patient verbalized understanding of instructions.    Ashleigh Marie RN

## 2023-04-06 ENCOUNTER — PREP FOR SURGERY (OUTPATIENT)
Dept: OTHER | Facility: HOSPITAL | Age: 68
End: 2023-04-06
Payer: MEDICARE

## 2023-04-06 DIAGNOSIS — Z12.11 SCREENING FOR MALIGNANT NEOPLASM OF COLON: Primary | ICD-10-CM

## 2023-04-06 DIAGNOSIS — Z86.010 HISTORY OF COLONIC POLYPS: ICD-10-CM

## 2023-04-07 ENCOUNTER — ANESTHESIA (OUTPATIENT)
Dept: GASTROENTEROLOGY | Facility: HOSPITAL | Age: 68
End: 2023-04-07
Payer: MEDICARE

## 2023-04-07 ENCOUNTER — ANESTHESIA EVENT (OUTPATIENT)
Dept: GASTROENTEROLOGY | Facility: HOSPITAL | Age: 68
End: 2023-04-07
Payer: MEDICARE

## 2023-04-07 ENCOUNTER — HOSPITAL ENCOUNTER (OUTPATIENT)
Facility: HOSPITAL | Age: 68
Setting detail: HOSPITAL OUTPATIENT SURGERY
Discharge: HOME | End: 2023-04-07
Attending: SURGERY | Admitting: SURGERY
Payer: MEDICARE

## 2023-04-07 VITALS
HEART RATE: 78 BPM | WEIGHT: 220.46 LBS | BODY MASS INDEX: 27.56 KG/M2 | OXYGEN SATURATION: 98 % | RESPIRATION RATE: 16 BRPM | TEMPERATURE: 97.8 F | SYSTOLIC BLOOD PRESSURE: 124 MMHG | DIASTOLIC BLOOD PRESSURE: 88 MMHG

## 2023-04-07 PROCEDURE — 25010000002 PROPOFOL 10 MG/ML EMULSION: Performed by: NURSE ANESTHETIST, CERTIFIED REGISTERED

## 2023-04-07 RX ORDER — SODIUM CHLORIDE, SODIUM LACTATE, POTASSIUM CHLORIDE, CALCIUM CHLORIDE 600; 310; 30; 20 MG/100ML; MG/100ML; MG/100ML; MG/100ML
30 INJECTION, SOLUTION INTRAVENOUS CONTINUOUS
Status: DISCONTINUED | OUTPATIENT
Start: 2023-04-07 | End: 2023-04-07 | Stop reason: HOSPADM

## 2023-04-07 RX ORDER — PROPOFOL 10 MG/ML
VIAL (ML) INTRAVENOUS AS NEEDED
Status: DISCONTINUED | OUTPATIENT
Start: 2023-04-07 | End: 2023-04-07 | Stop reason: SURG

## 2023-04-07 RX ORDER — LIDOCAINE HYDROCHLORIDE 20 MG/ML
INJECTION, SOLUTION EPIDURAL; INFILTRATION; INTRACAUDAL; PERINEURAL AS NEEDED
Status: DISCONTINUED | OUTPATIENT
Start: 2023-04-07 | End: 2023-04-07 | Stop reason: SURG

## 2023-04-07 RX ADMIN — PROPOFOL 100 MG: 10 INJECTION, EMULSION INTRAVENOUS at 08:26

## 2023-04-07 RX ADMIN — PROPOFOL 175 MCG/KG/MIN: 10 INJECTION, EMULSION INTRAVENOUS at 08:26

## 2023-04-07 RX ADMIN — LIDOCAINE HYDROCHLORIDE 50 MG: 20 INJECTION, SOLUTION EPIDURAL; INFILTRATION; INTRACAUDAL; PERINEURAL at 08:26

## 2023-04-07 RX ADMIN — SODIUM CHLORIDE, POTASSIUM CHLORIDE, SODIUM LACTATE AND CALCIUM CHLORIDE: 600; 310; 30; 20 INJECTION, SOLUTION INTRAVENOUS at 08:22

## 2023-04-07 NOTE — ANESTHESIA PREPROCEDURE EVALUATION
Anesthesia Evaluation     Patient summary reviewed and Nursing notes reviewed   no history of anesthetic complications:  NPO Solid Status: > 8 hours  NPO Liquid Status: > 2 hours           Airway   Mallampati: II  TM distance: >3 FB  Neck ROM: full  No difficulty expected  Dental    (+) implants    Pulmonary - normal exam    breath sounds clear to auscultation  (+) sleep apnea,   Cardiovascular   Exercise tolerance: good (4-7 METS)    Rhythm: irregular  Rate: normal    (+) hypertension, valvular problems/murmurs murmur, dysrhythmias Paroxysmal Atrial Fib,       Neuro/Psych- negative ROS  GI/Hepatic/Renal/Endo - negative ROS     Musculoskeletal (-) negative ROS    Abdominal    Substance History - negative use     OB/GYN negative ob/gyn ROS         Other - negative ROS       ROS/Med Hx Other: PAT Nursing Notes unavailable.                   Anesthesia Plan    ASA 3     general       Anesthetic plan, risks, benefits, and alternatives have been provided, discussed and informed consent has been obtained with: patient and spouse/significant other.        CODE STATUS:

## 2023-04-07 NOTE — ANESTHESIA POSTPROCEDURE EVALUATION
Patient: Poncho Hernandez    Procedure Summary     Date: 04/07/23 Room / Location: [facility] ENDOSCOPY 5 / [facility] ENDOSCOPY    Anesthesia Start: 0822 Anesthesia Stop: 0907    Procedure: COLONOSCOPY Diagnosis:       Screening for malignant neoplasm of colon      History of colonic polyps      (Screening for malignant neoplasm of colon [Z12.11])      (History of colonic polyps [Z86.010])    Surgeons: Kenneth Damico MD Provider: Ayo Nathan MD    Anesthesia Type: general ASA Status: 3          Anesthesia Type: general    Vitals  Vitals Value Taken Time   BP 95/72 04/07/23 0914   Temp 36.4 °C (97.6 °F) 04/07/23 0904   Pulse 92 04/07/23 0914   Resp 18 04/07/23 0914   SpO2 98 % 04/07/23 0914           Post Anesthesia Care and Evaluation    Patient location during evaluation: bedside  Patient participation: complete - patient participated  Level of consciousness: awake  Pain management: adequate    Airway patency: patent  PONV Status: none  Cardiovascular status: acceptable  Respiratory status: acceptable  Hydration status: acceptable    Comments: An Anesthesiologist personally participated in the most demanding procedures (including induction and emergence if applicable) in the anesthesia plan, monitored the course of anesthesia administration at frequent intervals and remained physically present and available for immediate diagnosis and treatment of emergencies.

## 2023-04-07 NOTE — H&P
Colonoscopy consult      History of Present Illness  Poncho Hernandez is a 66 y.o. male presents to [facility] for colonoscopy consult.     Patient presents today without complaints for screening colonoscopy.     He denies any abdominal pain, change in bowel habit, rectal bleeding.     Admits to family history with his paternal aunt and uncle of colon cancer.     Admits to history of colonic polyps.     5/16: Colonoscopy ([facility]): Sigmoid - tubular adenoma; Rectum - hyperplastic; diverticulosis.     Patient reports that he is taking Eliquis daily for A. Fib,  and is under the care of Dr. Iglesias.  I will get cardiac clearance prior to the procedure        Objective         Medical History        Past Medical History:   Diagnosis Date   • Abnormal ECG     • Aftercare following bilateral knee joint replacement surgery 04/03/2018   • Aneurysm (HCC)     • Arrhythmia     • Arthritis     • Bladder disorder     • BPH (benign prostatic hyperplasia)     • Elevated PSA       MANAGED WITHOUT MEDICATIONS, SITUATIONAL   • Forgetfulness     • H/O hernia repair     • Heart disease     • Heart murmur     • High blood pressure     • Hypertension     • Limb swelling     • Low testosterone in male     • Night sweats     • Paroxysmal A-fib (HCC) 8/1/2021   • Prostate disorder     • Sleep apnea              Surgical History         Past Surgical History:   Procedure Laterality Date   • ADENOIDECTOMY   1958   • COLONOSCOPY       • HAND SURGERY Left 2005   • HERNIA REPAIR       • JOINT REPLACEMENT         ARTIFICIAL JOINTS/LIMBS   • OTHER SURGICAL HISTORY Left       ARM SURGERY   • OTHER SURGICAL HISTORY         METAL IMPLANTS   • PROSTATE BIOPSY       • REPLACEMENT TOTAL KNEE BILATERAL   03/19/2018     BY LEVI   • TURP / TRANSURETHRAL INCISION / DRAINAGE PROSTATE   05/31/2018            Medications Taking          Outpatient Medications Marked as Taking for the 9/27/22 encounter (Office Visit) with  "Rodney, April, APRN   Medication Sig Dispense Refill   • Diclofenac Sodium (VOLTAREN) 1 % gel gel As Needed.       • lisinopril (PRINIVIL,ZESTRIL) 40 MG tablet Take 1 tablet by mouth Daily. 90 tablet 3   • propranolol (INDERAL) 10 MG tablet Take 1 tablet by mouth 2 (Two) Times a Day. 180 tablet 1            No Known Allergies            Family History   Problem Relation Age of Onset   • Stroke Mother     • Heart disease Mother     • Hypertension Mother     • Osteoporosis Mother     • Stroke Father     • Heart disease Father     • Cancer Father     • Heart attack Father     • Prostate cancer Father 88   • Osteoporosis Father     • Arthritis Father     • Arthritis Sister     • Arthritis Brother     • Hypertension Maternal Grandmother     • Colon cancer Other     • Colon cancer Other     • Arthritis Son           Social History   Social History      Socioeconomic History   • Marital status:    Tobacco Use   • Smoking status: Never Smoker   • Smokeless tobacco: Never Used   Vaping Use   • Vaping Use: Never used   Substance and Sexual Activity   • Alcohol use: Never   • Drug use: Never   • Sexual activity: Defer            Review of Systems   Constitutional: Negative for chills and fever.   Gastrointestinal: Negative for abdominal distention, abdominal pain, anal bleeding, blood in stool, constipation, diarrhea and rectal pain.         Vital Signs:   Pulse 77   Ht 190.5 cm (75\")   Wt 103 kg (227 lb)   BMI 28.37 kg/m²      Physical Exam  Constitutional:       General: He is not in acute distress.     Appearance: Normal appearance.   HENT:      Head: Normocephalic.   Cardiovascular:      Rate and Rhythm: Normal rate.   Pulmonary:      Effort: Pulmonary effort is normal.      Breath sounds: No stridor. No wheezing.   Abdominal:      General: Abdomen is flat.      Palpations: Abdomen is soft.   Musculoskeletal:         General: No deformity.   Skin:     General: Skin is warm and dry.      Coloration: Skin is not " jaundiced.   Neurological:      General: No focal deficit present.      Mental Status: He is alert and oriented to person, place, and time.   Psychiatric:         Mood and Affect: Mood normal.         Thought Content: Thought content normal.                  Result Review    :   []?  Laboratory  []?  Radiology  []?  Pathology  []?  Microbiology  []?  EKG/Telemetry   []?  Cardiology/Vascular   [x]?  Old records  Today I reviewed Dr. Miranda's previous operative and pathology report.        Assessment      Assessment and Plan    Diagnoses and all orders for this visit:     1. Screening for malignant neoplasm of colon (Primary)     2. History of colonic polyps     white prep     Follow Up   Return for Schedule colonoscopy with Dr. Damico on 12/23/2022 at [facility].      Hospital arrival time: 0900     Possible risks/complications, benefits, and alternatives to surgical or invasive procedure have been explained to patient and/or legal guardian.     Patient has been evaluated and can tolerate anesthesia and/or sedation. Risks, benefits, and alternatives to anesthesia and sedation have been explained to patient and/or legal guardian.  Patient verbalizes understanding is willing to proceed with the above plan.     Patient was given instructions and counseling regarding his condition or for health maintenance advice. Please see specific information pulled into the AVS if appropriate.      The office note was dictated with the help of the dragon dictation system.

## 2023-04-10 ENCOUNTER — TELEPHONE (OUTPATIENT)
Dept: CARDIOLOGY | Facility: CLINIC | Age: 68
End: 2023-04-10
Payer: MEDICARE

## 2023-04-10 NOTE — TELEPHONE ENCOUNTER
----- Message from TON Hsu sent at 4/7/2023  5:15 PM EDT -----    Although I see 1 or 2 readings on the lower side overall it appears blood pressures tend to run elevated, recommend he increase amlodipine from 2.5 mg to 5 mg daily.  We can send new prescription to pharmacy of his choice.    ----- Message -----  From: Ashley Sanchez RegSched Rep  Sent: 4/7/2023   2:22 PM EDT  To: TON Gomez

## 2023-04-10 NOTE — TELEPHONE ENCOUNTER
Spoke with pt he stated he stopped the amlodipine 2.5 mg a few days ago cause he don't like the way it made him feel.

## 2023-04-17 ENCOUNTER — TREATMENT (OUTPATIENT)
Dept: PHYSICAL THERAPY | Facility: CLINIC | Age: 68
End: 2023-04-17
Payer: MEDICARE

## 2023-04-17 DIAGNOSIS — R29.898 WEAKNESS OF RIGHT HIP: ICD-10-CM

## 2023-04-17 DIAGNOSIS — R26.9 GAIT DISTURBANCE: ICD-10-CM

## 2023-04-17 DIAGNOSIS — M25.551 RIGHT HIP PAIN: Primary | ICD-10-CM

## 2023-04-17 PROCEDURE — 97110 THERAPEUTIC EXERCISES: CPT | Performed by: PHYSICAL THERAPIST

## 2023-04-17 PROCEDURE — 97530 THERAPEUTIC ACTIVITIES: CPT | Performed by: PHYSICAL THERAPIST

## 2023-04-17 NOTE — PROGRESS NOTES
Outpatient Physical Therapy  [address] [facility], [place] [address]                            Physical Therapy Daily Treatment Note    Patient: Poncho Hernandez   : 1955  Diagnosis/ICD-10 Code:  Right hip pain [M25.551]  Referring practitioner: Adi Renteria MD  Date of Initial Visit: Type: THERAPY  Noted: 3/20/2023  Today's Date: 2023  Patient seen for 6 sessions             Subjective   Poncho Hernandez reports: pain varies. States he woke up last night with pain (aching and stabbing), and then a few hours later it was gone. Reports no soreness after PT. Pt states fast walking irritates hip.     Pain: 2/10 pain, currently    Objective   Pt had no increase in pain/discomfort with exercises, just fatigue and weakness.     See Exercise, Manual, and Modality Logs for complete treatment.     Assessment/Plan  Poncho progressing as evident by decreased overall hip pain. Pt tolerated exercises well, just general fatigue. Pt would benefit from skilled PT to address Range of Motion  and Strength deficits, pain management and any concerns with ADLs.         Progress per Plan of Care         Timed:  Manual Therapy:         mins  64503;  Therapeutic Exercise:      15   mins  49082;     Neuromuscular Freda:        mins  24910;    Therapeutic Activity:     15     mins  52060;     Gait Training:           mins  27628;    Aquatic Therapy:          mins  66530;       Untimed:  Electrical Stimulation:         mins  42643 ( );  Mechanical Traction:         mins  48869;       Timed Treatment:   30   mins   Total Treatment:    30    mins      Electronically signed:   Sharon Olivo PTA Student    Supervised by:  Hope Schmidt PTA  Physical Therapist Assistant  [facility] License #: [id number]

## 2023-04-21 ENCOUNTER — TREATMENT (OUTPATIENT)
Dept: PHYSICAL THERAPY | Facility: CLINIC | Age: 68
End: 2023-04-21
Payer: MEDICARE

## 2023-04-21 ENCOUNTER — TELEPHONE (OUTPATIENT)
Dept: CARDIOLOGY | Facility: CLINIC | Age: 68
End: 2023-04-21
Payer: MEDICARE

## 2023-04-21 DIAGNOSIS — R26.9 GAIT DISTURBANCE: ICD-10-CM

## 2023-04-21 DIAGNOSIS — R29.898 WEAKNESS OF RIGHT HIP: ICD-10-CM

## 2023-04-21 DIAGNOSIS — M25.551 RIGHT HIP PAIN: Primary | ICD-10-CM

## 2023-04-21 RX ORDER — HYDROCHLOROTHIAZIDE 12.5 MG/1
12.5 CAPSULE, GELATIN COATED ORAL DAILY
Qty: 90 CAPSULE | Refills: 3 | Status: SHIPPED | OUTPATIENT
Start: 2023-04-21

## 2023-04-21 NOTE — TELEPHONE ENCOUNTER
05/12/22 1446   Engagement   Intervention Group   Topic Detail OT Creative Expressions group-Birdhouse painting for focus, symptom management, creativity, social engagement, following directions, healthy distraction, and problem solving   Attendance Attended   Patient Response Demonstrated understanding of materials provided;Accepted feedback;Asked questions and/or took notes;Was respectful   Concentrated on Task 30 - 45 min   Cognition Sequences task   Mood/Affect Anxious   Social/Behavioral Cooperative;Engaged;Redirectable   Goals addressed in session today pt arrived late for group. pt was polite and appropriate during interactions and when asking for supplies from peers. pt was independent with task. pt chose to paint birdhouse all white. pt worked quietly and meticulously on project. pt becomes overwhelmed with projects about 20-30 minutes in. pt asked to be done for the day and cleaned up her space.      SW patient voiced understanding.

## 2023-04-21 NOTE — PROGRESS NOTES
Progress Assessment  [address] [place] [address]        Patient: Poncho Hernandez   : 1955  Diagnosis/ICD-10 Code:  Right hip pain [M25.551]  Referring practitioner: Adi Renteria MD  Date of Initial Visit: Type: THERAPY  Noted: 3/20/2023  Today's Date: 2023  Patient seen for 7 sessions      Subjective:   Poncho Hernandez reports: 1/10  Subjective Questionnaire: LEFS: 60/80  Clinical Progress: improved  Home Program Compliance: Yes  Treatment has included: therapeutic exercise, neuromuscular re-education, manual therapy, therapeutic activity and gait training    Subjective Pt reports he has returned back to exercise classes and is able to attend a full cycling class with no difficulty. Pt reports he able to attend Anh for a half hour before needing to stop due to his R hip. Pt reports he was also able to push mow his yard with no difficulty today.     Objective     Strength/Myotome Testing      Left Hip   Planes of Motion   Flexion: 5  Extension: 4  Abduction: 4+  Adduction: 4+  External rotation: 4+  Internal rotation: 4+     Right Hip   Planes of Motion   Flexion: 4+  Extension: 4  Abduction: 4  Adduction: 4  External rotation: 4+  Internal rotation: 4+     Left Knee   Flexion: 5  Extension: 5     Right Knee   Flexion: 5  Extension: 5    Assessment/Plan     Pt has made excellent progress with improving strength in R hip since starting therapy. Pt has also made progress with improving tolerance to functional mobility as demonstrated by improvement in LEFS.Pt has returned back to exercise classes and yardwork with minimal to no difficulty. Pt was able to ascend stairs with one handrail and no handrail with descending. Due to patient's excellent progress and excellent compliance with exercises will be discharging patient to [facility]. Educated patient to continue to stay active in order to maintain strength gained and continue to improve strength. Discussed with patient to call if he has  any issues or questions.       Plan Goals: HIP PROBLEMS:    1. The patient complains of right hip pain.    LTG 1: 12 weeks: The patient will report a pain rating of 1/10 or better in order to improve tolerance to activities of daily living and improve sleep quality.    STATUS: MET    STG 1a: 6 weeks: The patient will report a pain rating of 3/10 or better.    STATUS: MET    2. The patient demonstrates weakness of the right hip.    LTG 2: 12 weeks: The patient will demonstrate 5/5 strength for right hip flexion, abduction, and extension in order to improve hip stability.    STATUS: NOT MET    STG 2a: 6 weeks: The patient will demonstrate 4/5 strength for right hip flexion, abduction, and extension.    STATUS: MET    3. The patient has gait dysfunction.    LTG 3: 12 weeks: The patient will ambulate without assistive device, independently, for community distances with minimal limp to the right lower extremity in order to improve mobility and allow patient to perform activities such as grocery shopping with greater ease.    STATUS: MET    STG 3a: The patient will be independent in [facility].    STATUS: MET    Mobility: Walking/Moving Around Functional Limitation    LTG 4: 12 weeks: The patient will demonstrate 1-19% limitation by achieving a score of 65 on the Lower Extremity Functional Scale.    STATUS: NOT MET    STG 4a: 6 weeks: The patient will demonstrate 20-39% limitation by achieving a score of 48 on the Lower Extremity Functional Scale.    STATUS: MET    Progress toward previous goals: Partially Met    See Exercise, Manual, and Modality Logs for complete treatment.         Recommendations: Discharge    PT SIGNATURE: Electronically signed by Ryan Ybarra PT  [place] LICENSE: [id number]    Based upon review of the patient's progress and continued therapy plan, it is my medical opinion that Poncho Hernandez should continue physical therapy treatment at [facility] PHYS[facility] THERAP[facility]  [address]  [place] [address]  [phone].      Timed:                 Manual Therapy:    0     mins  29246;     Therapeutic Exercise:    19     mins  26263;     Neuromuscular Freda:    0    mins  09189;    Therapeutic Activity:     8     mins  13103;     Gait Trainin     mins  92945;     Ultrasound:     0     mins  29319;    Ionto                               0    mins   79853  Self pay                         0     mins [id number]    Un-Timed:  Electrical Stimulation:    0     mins  68424 ( )  Canalith Repos    0     mins 69890  Dry Needling     0     mins self-pay  Traction     0     mins [id number]    Timed Treatment:   27   mins   Total Treatment:     27   mins      I certify that the therapy services are furnished while this patient is under my care.  The services outlined above are required by this patient, and will be reviewed every 90 days.

## 2023-04-21 NOTE — TELEPHONE ENCOUNTER
----- Message from TON Gomez sent at 4/21/2023  8:52 AM EDT -----        ----- Message -----  From: Ashley Sanchez RegSched Rep  Sent: 4/21/2023   8:47 AM EDT  To: TON Gomez

## 2023-04-21 NOTE — TELEPHONE ENCOUNTER
Per Neeru: Still with some elevated readings, add hctz 12.5 mg daily. Continue to monitor BP    Attempted to call patient. No answer. VM left with return call requested.

## 2023-04-28 ENCOUNTER — OFFICE VISIT (OUTPATIENT)
Dept: SURGERY | Facility: CLINIC | Age: 68
End: 2023-04-28
Payer: MEDICARE

## 2023-04-28 VITALS — HEIGHT: 75 IN | HEART RATE: 85 BPM | BODY MASS INDEX: 28.42 KG/M2 | WEIGHT: 228.6 LBS

## 2023-04-28 DIAGNOSIS — Z98.890 S/P COLONOSCOPY: Primary | ICD-10-CM

## 2023-04-28 NOTE — PROGRESS NOTES
Chief Complaint: Post-op Follow-up    Subjective      Follow-up visit       History of Present Illness  Poncho Hernandez is a 67 y.o. male presents to [facility] for colonoscopy consultation.    Patient presents today for follow-up visit after undergoing a colonoscopy on 4/7/2023 performed by Dr. Kenneth Damico.  Patient was with a normal colon.  No specimens were collected.    Colonoscopy was performed without difficulty.  The patient tolerated the procedure well.  Patient denies any postoperative complications    Objective     Past Medical History:   Diagnosis Date   • Abnormal ECG    • Aftercare following bilateral knee joint replacement surgery 04/03/2018   • Aneurysm    • Arrhythmia    • Arthritis    • Bladder disorder    • BPH (benign prostatic hyperplasia)    • Elevated PSA     MANAGED WITHOUT MEDICATIONS, SITUATIONAL   • Forgetfulness    • H/O hernia repair    • Heart disease    • Heart murmur    • High blood pressure    • Hypertension    • Limb swelling    • Low testosterone in male    • Night sweats    • Paroxysmal A-fib 08/01/2021   • Prostate disorder    • Sleep apnea        Past Surgical History:   Procedure Laterality Date   • ADENOIDECTOMY  1958   • COLONOSCOPY     • COLONOSCOPY N/A 4/7/2023    Procedure: COLONOSCOPY;  Surgeon: Kenneth Damico MD;  Location: [facility] ENDOSCOPY;  Service: General;  Laterality: N/A;  very large prostate, normal colon   • HAND SURGERY Left 2005   • HERNIA REPAIR     • JOINT REPLACEMENT      ARTIFICIAL JOINTS/LIMBS   • OTHER SURGICAL HISTORY Left     ARM SURGERY   • OTHER SURGICAL HISTORY      METAL IMPLANTS   • PROSTATE BIOPSY     • REPLACEMENT TOTAL KNEE BILATERAL  03/19/2018    BY LEVI   • TURP / TRANSURETHRAL INCISION / DRAINAGE PROSTATE  05/31/2018       Outpatient Medications Marked as Taking for the 4/28/23 encounter (Office Visit) with Jodi Stevens APRN   Medication Sig Dispense Refill   • clobetasol (TEMOVATE) 0.05 %  "ointment APPLY A THIN LAYER TO AFFECTED AREA ON LEFT LEG TWICE DAILY AS NEEDED     • Eliquis 5 MG tablet tablet Take 1 tablet by mouth 2 (Two) Times a Day.     • lisinopril (PRINIVIL,ZESTRIL) 40 MG tablet Take 1 tablet by mouth Daily. 90 tablet 3   • propranolol (INDERAL) 10 MG tablet Take 1 tablet by mouth 2 (Two) Times a Day. 180 tablet 1   • traMADol (ULTRAM) 50 MG tablet Take 1 tablet by mouth Every 4 (Four) Hours As Needed for Moderate Pain. 15 tablet 0   • triamcinolone (KENALOG) 0.1 % ointment APPLY THIN LAYER TOPICALLY TO THE AFFECTED AREA TWICE DAILY AS NEEDED         No Known Allergies     Family History   Problem Relation Age of Onset   • Stroke Mother    • Heart disease Mother    • Hypertension Mother    • Osteoporosis Mother    • Stroke Father    • Heart disease Father    • Cancer Father    • Heart attack Father    • Prostate cancer Father 88   • Osteoporosis Father    • Arthritis Father    • Arthritis Sister    • Arthritis Brother    • Hypertension Maternal Grandmother    • Colon cancer Other    • Colon cancer Other    • Arthritis Son        Social History     Socioeconomic History   • Marital status:    Tobacco Use   • Smoking status: Never   • Smokeless tobacco: Never   Vaping Use   • Vaping Use: Never used   Substance and Sexual Activity   • Alcohol use: Never   • Drug use: Never   • Sexual activity: Defer       Review of Systems   Constitutional: Negative for chills and fever.   Gastrointestinal: Negative for abdominal distention, abdominal pain, anal bleeding, blood in stool, constipation, diarrhea and rectal pain.        Vital Signs:   Pulse 85   Ht 190.5 cm (75\")   Wt 104 kg (228 lb 9.6 oz)   BMI 28.57 kg/m²      Physical Exam  Vitals and nursing note reviewed.   Constitutional:       General: He is not in acute distress.     Appearance: Normal appearance.   HENT:      Head: Normocephalic.   Cardiovascular:      Rate and Rhythm: Normal rate.   Pulmonary:      Effort: Pulmonary effort " is normal.      Breath sounds: No stridor.   Abdominal:      Tenderness: There is no guarding.   Musculoskeletal:         General: No deformity. Normal range of motion.   Skin:     General: Skin is warm and dry.      Coloration: Skin is not jaundiced.   Neurological:      General: No focal deficit present.      Mental Status: He is alert and oriented to person, place, and time.   Psychiatric:         Mood and Affect: Mood normal.         Thought Content: Thought content normal.          Result Review :          []  Laboratory  []  Radiology  []  Pathology  []  Microbiology  []  EKG/Telemetry   []  Cardiology/Vascular   []  Old records  Today I reviewed Dr. Damico's operative report.     Assessment and Plan    Diagnoses and all orders for this visit:    1. S/P colonoscopy (Primary)        Follow Up   Return for Rescreen colon in 5 years follow-up in the interim as needed.     Avoid high fat diets    Increase fiber intake    Per AGA guidelines patient will follow-up for colonoscopy surveillance as directed.    Patient was given instructions and counseling regarding his condition or for health maintenance advice. Please see specific information pulled into the AVS if appropriate.

## 2023-05-01 ENCOUNTER — OFFICE VISIT (OUTPATIENT)
Dept: UROLOGY | Facility: CLINIC | Age: 68
End: 2023-05-01
Payer: MEDICARE

## 2023-05-01 VITALS — BODY MASS INDEX: 28.07 KG/M2 | HEIGHT: 75 IN | WEIGHT: 225.8 LBS

## 2023-05-01 DIAGNOSIS — R97.20 ELEVATED PROSTATE SPECIFIC ANTIGEN (PSA): ICD-10-CM

## 2023-05-01 DIAGNOSIS — N13.8 BPH WITH OBSTRUCTION/LOWER URINARY TRACT SYMPTOMS: Primary | ICD-10-CM

## 2023-05-01 DIAGNOSIS — N40.1 BPH WITH OBSTRUCTION/LOWER URINARY TRACT SYMPTOMS: Primary | ICD-10-CM

## 2023-05-01 DIAGNOSIS — R35.1 NOCTURIA: ICD-10-CM

## 2023-05-01 PROCEDURE — 1159F MED LIST DOCD IN RCRD: CPT | Performed by: UROLOGY

## 2023-05-01 PROCEDURE — 99214 OFFICE O/P EST MOD 30 MIN: CPT | Performed by: UROLOGY

## 2023-05-01 PROCEDURE — 1160F RVW MEDS BY RX/DR IN RCRD: CPT | Performed by: UROLOGY

## 2023-05-01 RX ORDER — TAMSULOSIN HYDROCHLORIDE 0.4 MG/1
1 CAPSULE ORAL DAILY
Qty: 90 CAPSULE | Refills: 3 | Status: SHIPPED | OUTPATIENT
Start: 2023-05-01 | End: 2024-04-25

## 2023-05-01 NOTE — PROGRESS NOTES
Chief Complaint  Englarged prostate    Subjective          Poncho Hernandez presents to [facility] UROLOGY  History of Present Illness  Past uro history   -----------------------------     He has had an up and down elevated PSA for years with a negative prostate biopsy in 2011.  At that time his PSA was 5.7.  It had been as high as 6.98 in 2014.  It was 3.84 in Dec 2018 and then 9.10 in Feb 2019 in a repeat check.  It was 6.68 in April 2019 after Bactrim for 4 weeks.  He also had another round of Cipro for a UTI.       He had a button TURP that was unremarkable in 2018.     Pelvic ultrasound in 2017 revealed a 5 x 6 x 4 cm prostate.       The patient presents today for a follow-up.     The patient reports that he had a colonoscopy and was informed that his prostate was enlarged. He states that he had prostate reduction surgery approximately 5 years ago. The patient reports that if he feels good and his blood pressure is normal, he will get up 2 to 3 times at night to urinate. He states that if his blood pressure is elevated, it is a nightmare because it is every 20 to 30 minutes. The patient reports that his blood pressure was elevated last night, and he was constantly going to the bathroom. He states that this morning his blood pressure is still elevated, but not as bad. The patient reports that the frequency of having to go has decreased. He states that there is a direct correlation between his blood pressure and how he has to go. The patient reports that if the blood pressure is good and a fantastic night, it would be getting upset 2 to 3 times and it is almost impossible for him to get through a night with that. He states that he thinks 2 to 3 hours is the maximum amount of time he can spend between urinating. The patient reports that someone told him that he got a prescription for Flomax, and he had a similar problem and that helped him, but he has never tried it. He states that he has a  "problem with getting drowsy while he is awake during the day. The patient reports that it is worse when he has a terrible night like this. He states that he is glad that it is directly linked to blood pressure. The patient reports that if he gets upset, he will have to go to the bathroom a lot, but then he realized that if he gets upset, his blood pressure goes up. He states that he has had some difficulty in urinating. The patient reports that he will urinate and then he will lie down and while he is lying down, the bladder fills up again and then he has to get up again before he has a chance to fall asleep. He states that when he has a bad night, he feels like he must push to get the urine out, while if he feels good, it comes out easily. The patient reports that his PSA always runs high. He states that he was taking testosterone supplements, but he has not taken them for a long time now. The patient reports that he had a lot of muscle weakness after his knee surgery. He states that they put testosterone to get those muscles back in shape. The patient reports that he had a muscle strain in his right hip to the point where he could not walk properly. He states that he had to use a cane. The patient reports that he was taking Motrin during the time he was taking Motrin, his blood pressure came out of control. He states that the rheumatologist explained to him that Motrin can raise blood pressure. The patient reports that it neutralizes the lisinopril and the beta blocker. He states that he discontinued Motrin, and his blood pressure improved a lot.     Objective   Vital Signs:   Ht 190.5 cm (75\")   Wt 102 kg (225 lb 12.8 oz)   BMI 28.22 kg/m²       Physical Exam  Vitals and nursing note reviewed.   Constitutional:       Appearance: Normal appearance. He is well-developed.   Pulmonary:      Effort: Pulmonary effort is normal.      Breath sounds: Normal air entry.   Neurological:      Mental Status: He is alert and " oriented to person, place, and time.      Motor: Motor function is intact.   Psychiatric:         Mood and Affect: Mood normal.         Behavior: Behavior normal.          Result Review :   The following data was reviewed by: Monica Phillips MD on 05/01/2023:      PSA        1/11/2023    16:56   PSA   PSA 6.930              Assessment and Plan    Diagnoses and all orders for this visit:    1. BPH with obstruction/lower urinary tract symptoms (Primary)  -     tamsulosin (FLOMAX) 0.4 MG capsule 24 hr capsule; Take 1 capsule by mouth Daily for 360 days.  Dispense: 90 capsule; Refill: 3    2. Nocturia  -     tamsulosin (FLOMAX) 0.4 MG capsule 24 hr capsule; Take 1 capsule by mouth Daily for 360 days.  Dispense: 90 capsule; Refill: 3    3. Elevated prostate specific antigen (PSA)  -     PSA DIAGNOSTIC; Future      1. Nocturia  - I will start him on Flomax for his nocturia and he will call me if that is not effective.    2. Elevated PSA  - Again, we will also recheck it in 6 months. Most recently, he was 6.6 ng/mL, which is down from his high of 8 ng/mL.            Follow Up       No follow-ups on file.  Patient was given instructions and counseling regarding his condition or for health maintenance advice. Please see specific information pulled into the AVS if appropriate.     Transcribed from ambient dictation for Monica Phillips MD by Sumeet Soni, Quality .  05/01/23   11:23 EDT    Patient or patient representative verbalized consent to the visit recording.  I have personally performed the services described in this document as transcribed by the above individual, and it is both accurate and complete.  Monica Phillips MD  5/2/2023  13:17 EDT

## 2023-05-10 ENCOUNTER — TELEPHONE (OUTPATIENT)
Dept: CARDIOLOGY | Facility: CLINIC | Age: 68
End: 2023-05-10
Payer: MEDICARE

## 2023-05-10 NOTE — TELEPHONE ENCOUNTER
----- Message from TON Gomez sent at 5/10/2023 10:49 AM EDT -----        ----- Message -----  From: Ashley Sanchez RegSched Rep  Sent: 5/10/2023  10:31 AM EDT  To: TON Gomez

## 2023-05-10 NOTE — TELEPHONE ENCOUNTER
Per Neeru:  Stop hctz and amlodipine  Switch lisinopril to losartan 25 mg twice daily  Continue to monitor BP and HR for 1 week and notify office of levels      Attempted to call patient. No answer. VM left with return call requested.

## 2023-05-11 RX ORDER — LOSARTAN POTASSIUM 25 MG/1
25 TABLET ORAL 2 TIMES DAILY
Qty: 180 TABLET | Refills: 3 | Status: SHIPPED | OUTPATIENT
Start: 2023-05-11

## 2023-05-23 ENCOUNTER — TELEPHONE (OUTPATIENT)
Dept: CARDIOLOGY | Facility: CLINIC | Age: 68
End: 2023-05-23
Payer: MEDICARE

## 2023-05-23 RX ORDER — LOSARTAN POTASSIUM 50 MG/1
50 TABLET ORAL 2 TIMES DAILY
Qty: 180 TABLET | Refills: 3 | Status: SHIPPED | OUTPATIENT
Start: 2023-05-23

## 2023-05-23 NOTE — TELEPHONE ENCOUNTER
----- Message from TON Gomez sent at 5/23/2023  2:43 PM EDT -----  Increase losartan to 50 mg BID  ----- Message -----  From: Cece Unger RegSched Rep  Sent: 5/23/2023   2:28 PM EDT  To: TON Gomez

## 2023-09-05 ENCOUNTER — TELEPHONE (OUTPATIENT)
Dept: CARDIOLOGY | Facility: CLINIC | Age: 68
End: 2023-09-05
Payer: MEDICARE

## 2023-09-05 NOTE — TELEPHONE ENCOUNTER
[facility] received a fax that requires your attention. The document has been indexed to the patient’s chart for your review.      Reason for sending: EXTERNAL MEDICAL RECORD NOTIFICATION     Documents Description: [facility]-9.1.23    Name of Sender: [facility]     Date Indexed: 9.1.23

## 2023-10-30 ENCOUNTER — LAB (OUTPATIENT)
Dept: LAB | Facility: HOSPITAL | Age: 68
End: 2023-10-30
Payer: MEDICARE

## 2023-10-30 DIAGNOSIS — R97.20 ELEVATED PROSTATE SPECIFIC ANTIGEN (PSA): ICD-10-CM

## 2023-10-30 LAB — PSA SERPL-MCNC: 8.1 NG/ML (ref 0–4)

## 2023-10-30 PROCEDURE — 84153 ASSAY OF PSA TOTAL: CPT

## 2023-10-30 PROCEDURE — 36415 COLL VENOUS BLD VENIPUNCTURE: CPT

## 2023-11-06 ENCOUNTER — OFFICE VISIT (OUTPATIENT)
Dept: UROLOGY | Facility: CLINIC | Age: 68
End: 2023-11-06
Payer: MEDICARE

## 2023-11-06 ENCOUNTER — TELEPHONE (OUTPATIENT)
Dept: UROLOGY | Facility: CLINIC | Age: 68
End: 2023-11-06

## 2023-11-06 VITALS
HEIGHT: 75 IN | BODY MASS INDEX: 28.1 KG/M2 | SYSTOLIC BLOOD PRESSURE: 123 MMHG | DIASTOLIC BLOOD PRESSURE: 78 MMHG | WEIGHT: 226 LBS

## 2023-11-06 DIAGNOSIS — N13.8 BPH WITH OBSTRUCTION/LOWER URINARY TRACT SYMPTOMS: Primary | ICD-10-CM

## 2023-11-06 DIAGNOSIS — N40.1 BPH WITH OBSTRUCTION/LOWER URINARY TRACT SYMPTOMS: Primary | ICD-10-CM

## 2023-11-06 DIAGNOSIS — R97.20 ELEVATED PROSTATE SPECIFIC ANTIGEN (PSA): ICD-10-CM

## 2023-11-06 LAB
BILIRUB BLD-MCNC: NEGATIVE MG/DL
CLARITY, POC: CLEAR
COLOR UR: YELLOW
EXPIRATION DATE: ABNORMAL
GLUCOSE UR STRIP-MCNC: NEGATIVE MG/DL
KETONES UR QL: ABNORMAL
LEUKOCYTE EST, POC: NEGATIVE
Lab: ABNORMAL
NITRITE UR-MCNC: NEGATIVE MG/ML
PH UR: 5 [PH] (ref 5–8)
PROT UR STRIP-MCNC: ABNORMAL MG/DL
RBC # UR STRIP: NEGATIVE /UL
SP GR UR: 1.03 (ref 1–1.03)
UROBILINOGEN UR QL: ABNORMAL

## 2023-11-06 RX ORDER — LISINOPRIL 40 MG/1
TABLET ORAL
COMMUNITY

## 2023-11-06 NOTE — TELEPHONE ENCOUNTER
Spoke to patient and informed of MRI appointment at [facility] on 12/11/23 at 1:00 with an arrival time of 12:30 at [address] in [place]. I informed patient not to wear metal or jewelry to the appointment. Patient has had a knee replacement and reports an inguinal hernia repair 9 years ago but has had an MRI of spine since the repair. Patient verbalized understanding.

## 2023-11-06 NOTE — PROGRESS NOTES
"Chief Complaint  Benign Prostatic Hypertrophy    Subjective          Poncho Hernandez presents to [facility] UROLOGY    History of Present Illness  Patient is here for follow-up for elevated PSA.    He has had an up and down elevated PSA for years with a negative prostate biopsy in 2011.  At that time his PSA was 5.7.  It had been as high as 6.98 in 2014.  It was 3.84 in Dec 2018 and then 9.10 in Feb 2019 in a repeat check.  It was 6.68 in April 2019 after Bactrim for 4 weeks.  He also had another round of Cipro for a UTI.       He had a button TURP that was unremarkable in 2018.     Pelvic ultrasound in 2017 revealed a 5 x 6 x 4 cm prostate.         Objective   Vital Signs:   /78   Ht 190.5 cm (75\")   Wt 103 kg (226 lb)   BMI 28.25 kg/m²       Physical Exam  Vitals and nursing note reviewed.   Constitutional:       Appearance: Normal appearance. He is well-developed.   Pulmonary:      Effort: Pulmonary effort is normal.      Breath sounds: Normal air entry.   Neurological:      Mental Status: He is alert and oriented to person, place, and time.      Motor: Motor function is intact.   Psychiatric:         Mood and Affect: Mood normal.         Behavior: Behavior normal.          Result Review :   The following data was reviewed by: Monica Phillips MD on 11/06/2023:    Results for orders placed or performed in visit on 11/06/23   POC Urinalysis Dipstick, Automated    Specimen: Urine   Result Value Ref Range    Color Yellow Yellow, Straw, Dark Yellow, Virginie    Clarity, UA Clear Clear    Specific Gravity  1.030 1.005 - 1.030    pH, Urine 5.0 5.0 - 8.0    Leukocytes Negative Negative    Nitrite, UA Negative Negative    Protein, POC 30 mg/dL (A) Negative mg/dL    Glucose, UA Negative Negative mg/dL    Ketones, UA Trace (A) Negative    Urobilinogen, UA 0.2 E.U./dL Normal, 0.2 E.U./dL    Bilirubin Negative Negative    Blood, UA Negative Negative    Lot Number [id number]     Expiration Date " 92,024        PSA          1/11/2023    16:56 10/30/2023    15:58   PSA   PSA 6.930  8.100                Assessment and Plan    Diagnoses and all orders for this visit:    1. BPH with obstruction/lower urinary tract symptoms (Primary)  -     POC Urinalysis Dipstick, Automated    2. Elevated prostate specific antigen (PSA)    We will get a prostate MRI.  I will call him with those results.  PSA has fluctuated between 6 and 7 over the past few years but has been as high as 9.  Currently is 8.  He had a negative prostate biopsy in 2011.  He has never had a prostate MRI.  States he is voiding well.  He is status post TURP.          Follow Up       No follow-ups on file.  Patient was given instructions and counseling regarding his condition or for health maintenance advice. Please see specific information pulled into the AVS if appropriate.

## 2023-12-12 DIAGNOSIS — R97.20 ELEVATED PROSTATE SPECIFIC ANTIGEN (PSA): ICD-10-CM

## 2024-01-18 ENCOUNTER — OFFICE VISIT (OUTPATIENT)
Dept: FAMILY MEDICINE CLINIC | Facility: CLINIC | Age: 69
End: 2024-01-18
Payer: MEDICARE

## 2024-01-18 VITALS
HEART RATE: 96 BPM | TEMPERATURE: 97.1 F | SYSTOLIC BLOOD PRESSURE: 138 MMHG | OXYGEN SATURATION: 97 % | WEIGHT: 229 LBS | HEIGHT: 75 IN | DIASTOLIC BLOOD PRESSURE: 90 MMHG | BODY MASS INDEX: 28.47 KG/M2

## 2024-01-18 DIAGNOSIS — Z13.220 SCREENING FOR HYPERLIPIDEMIA: ICD-10-CM

## 2024-01-18 DIAGNOSIS — I10 HYPERTENSION, UNSPECIFIED TYPE: ICD-10-CM

## 2024-01-18 DIAGNOSIS — E11.9 TYPE 2 DIABETES MELLITUS WITHOUT COMPLICATION, WITHOUT LONG-TERM CURRENT USE OF INSULIN: ICD-10-CM

## 2024-01-18 DIAGNOSIS — Z23 NEED FOR PNEUMOCOCCAL VACCINATION: ICD-10-CM

## 2024-01-18 DIAGNOSIS — Z13.1 SCREENING FOR DIABETES MELLITUS: ICD-10-CM

## 2024-01-18 DIAGNOSIS — Z00.00 MEDICARE ANNUAL WELLNESS VISIT, SUBSEQUENT: Primary | ICD-10-CM

## 2024-01-18 LAB
ALBUMIN SERPL-MCNC: 4.1 G/DL (ref 3.5–5.2)
ALBUMIN/GLOB SERPL: 1.4 G/DL
ALP SERPL-CCNC: 63 U/L (ref 39–117)
ALT SERPL W P-5'-P-CCNC: 28 U/L (ref 1–41)
ANION GAP SERPL CALCULATED.3IONS-SCNC: 11.8 MMOL/L (ref 5–15)
AST SERPL-CCNC: 35 U/L (ref 1–40)
BASOPHILS # BLD AUTO: 0.06 10*3/MM3 (ref 0–0.2)
BASOPHILS NFR BLD AUTO: 1 % (ref 0–1.5)
BILIRUB SERPL-MCNC: 1.1 MG/DL (ref 0–1.2)
BUN SERPL-MCNC: 23 MG/DL (ref 8–23)
BUN/CREAT SERPL: 25.3 (ref 7–25)
CALCIUM SPEC-SCNC: 9.2 MG/DL (ref 8.6–10.5)
CHLORIDE SERPL-SCNC: 102 MMOL/L (ref 98–107)
CHOLEST SERPL-MCNC: 160 MG/DL (ref 0–200)
CO2 SERPL-SCNC: 24.2 MMOL/L (ref 22–29)
CREAT SERPL-MCNC: 0.91 MG/DL (ref 0.76–1.27)
DEPRECATED RDW RBC AUTO: 45.6 FL (ref 37–54)
EGFRCR SERPLBLD CKD-EPI 2021: 90.7 ML/MIN/1.73
EOSINOPHIL # BLD AUTO: 0.07 10*3/MM3 (ref 0–0.4)
EOSINOPHIL NFR BLD AUTO: 1.2 % (ref 0.3–6.2)
ERYTHROCYTE [DISTWIDTH] IN BLOOD BY AUTOMATED COUNT: 13.9 % (ref 12.3–15.4)
GLOBULIN UR ELPH-MCNC: 2.9 GM/DL
GLUCOSE SERPL-MCNC: 123 MG/DL (ref 65–99)
HBA1C MFR BLD: 6.2 % (ref 4.8–5.6)
HCT VFR BLD AUTO: 40.8 % (ref 37.5–51)
HDLC SERPL-MCNC: 51 MG/DL (ref 40–60)
HGB BLD-MCNC: 13.3 G/DL (ref 13–17.7)
IMM GRANULOCYTES # BLD AUTO: 0.02 10*3/MM3 (ref 0–0.05)
IMM GRANULOCYTES NFR BLD AUTO: 0.3 % (ref 0–0.5)
LDLC SERPL CALC-MCNC: 98 MG/DL (ref 0–100)
LDLC/HDLC SERPL: 1.93 {RATIO}
LYMPHOCYTES # BLD AUTO: 1.21 10*3/MM3 (ref 0.7–3.1)
LYMPHOCYTES NFR BLD AUTO: 20.7 % (ref 19.6–45.3)
MCH RBC QN AUTO: 29.3 PG (ref 26.6–33)
MCHC RBC AUTO-ENTMCNC: 32.6 G/DL (ref 31.5–35.7)
MCV RBC AUTO: 89.9 FL (ref 79–97)
MONOCYTES # BLD AUTO: 0.48 10*3/MM3 (ref 0.1–0.9)
MONOCYTES NFR BLD AUTO: 8.2 % (ref 5–12)
NEUTROPHILS NFR BLD AUTO: 4 10*3/MM3 (ref 1.7–7)
NEUTROPHILS NFR BLD AUTO: 68.6 % (ref 42.7–76)
NRBC BLD AUTO-RTO: 0 /100 WBC (ref 0–0.2)
PLATELET # BLD AUTO: 225 10*3/MM3 (ref 140–450)
PMV BLD AUTO: 11.2 FL (ref 6–12)
POTASSIUM SERPL-SCNC: 4.3 MMOL/L (ref 3.5–5.2)
PROT SERPL-MCNC: 7 G/DL (ref 6–8.5)
RBC # BLD AUTO: 4.54 10*6/MM3 (ref 4.14–5.8)
SODIUM SERPL-SCNC: 138 MMOL/L (ref 136–145)
TRIGL SERPL-MCNC: 53 MG/DL (ref 0–150)
TSH SERPL DL<=0.05 MIU/L-ACNC: 3.98 UIU/ML (ref 0.27–4.2)
VLDLC SERPL-MCNC: 11 MG/DL (ref 5–40)
WBC NRBC COR # BLD AUTO: 5.84 10*3/MM3 (ref 3.4–10.8)

## 2024-01-18 PROCEDURE — 84443 ASSAY THYROID STIM HORMONE: CPT | Performed by: STUDENT IN AN ORGANIZED HEALTH CARE EDUCATION/TRAINING PROGRAM

## 2024-01-18 PROCEDURE — 80053 COMPREHEN METABOLIC PANEL: CPT | Performed by: STUDENT IN AN ORGANIZED HEALTH CARE EDUCATION/TRAINING PROGRAM

## 2024-01-18 PROCEDURE — 85025 COMPLETE CBC W/AUTO DIFF WBC: CPT | Performed by: STUDENT IN AN ORGANIZED HEALTH CARE EDUCATION/TRAINING PROGRAM

## 2024-01-18 PROCEDURE — 83036 HEMOGLOBIN GLYCOSYLATED A1C: CPT | Performed by: STUDENT IN AN ORGANIZED HEALTH CARE EDUCATION/TRAINING PROGRAM

## 2024-01-18 PROCEDURE — 80061 LIPID PANEL: CPT | Performed by: STUDENT IN AN ORGANIZED HEALTH CARE EDUCATION/TRAINING PROGRAM

## 2024-01-18 NOTE — PROGRESS NOTES
The ABCs of the Annual Wellness Visit  Subsequent Medicare Wellness Visit    Subjective    Poncho Hernandez is a 68 y.o. male who presents for a Subsequent Medicare Wellness Visit.  with a past medical history of Afib on AC, HTN, elevated PSA, MRI 12/2023 with findings of BPH.  Presents today to establish care with me. He hs a history of Thoracic Aortic root aneurysm followed by Dr. David CT surgery at [facility].   The following portions of the patient's history were reviewed and   updated as appropriate: allergies, current medications, past family history, past medical history, past social history, past surgical history, and problem list.    Compared to one year ago, the patient feels his physical   health is better.    Compared to one year ago, the patient feels his mental   health is better.    Recent Hospitalizations:  He was not admitted to the hospital during the last year.       Current Medical Providers:  Patient Care Team:  Anthony Mendez APRN as PCP - General (Nurse Practitioner)  Jodi Stevens APRN as Nurse Practitioner (Nurse Practitioner)  Monica Phillips MD as Consulting Physician (Urology)    Outpatient Medications Prior to Visit   Medication Sig Dispense Refill    lisinopril (PRINIVIL,ZESTRIL) 40 MG tablet       clobetasol (TEMOVATE) 0.05 % ointment APPLY A THIN LAYER TO AFFECTED AREA ON LEFT LEG TWICE DAILY AS NEEDED (Patient not taking: Reported on 11/6/2023)      Eliquis 5 MG tablet tablet Take 1 tablet by mouth 2 (Two) Times a Day. (Patient not taking: Reported on 11/6/2023)      ibuprofen (ADVIL,MOTRIN) 800 MG tablet Take 1 tablet by mouth 3 (Three) Times a Day. (Patient not taking: Reported on 11/6/2023) 90 tablet 0    losartan (Cozaar) 50 MG tablet Take 1 tablet by mouth 2 (Two) Times a Day. (Patient not taking: Reported on 11/6/2023) 180 tablet 3    propranolol (INDERAL) 10 MG tablet Take 1 tablet by mouth 2 (Two) Times a Day. (Patient not taking: Reported on 11/6/2023) 180 tablet 1     tamsulosin (FLOMAX) 0.4 MG capsule 24 hr capsule Take 1 capsule by mouth Daily for 360 days. (Patient not taking: Reported on 11/6/2023) 90 capsule 3    tiZANidine (ZANAFLEX) 4 MG tablet  (Patient not taking: Reported on 11/6/2023)      traMADol (ULTRAM) 50 MG tablet Take 1 tablet by mouth Every 8 (Eight) Hours As Needed for Moderate Pain. (Patient not taking: Reported on 11/6/2023) 21 tablet 0    traMADol (ULTRAM) 50 MG tablet Take 1 tablet by mouth Every 4 (Four) Hours As Needed for Moderate Pain. (Patient not taking: Reported on 11/6/2023) 15 tablet 0    triamcinolone (KENALOG) 0.1 % ointment APPLY THIN LAYER TOPICALLY TO THE AFFECTED AREA TWICE DAILY AS NEEDED (Patient not taking: Reported on 11/6/2023)       No facility-administered medications prior to visit.       No opioid medication identified on active medication list. I have reviewed chart for other potential  high risk medication/s and harmful drug interactions in the elderly.        Aspirin is not on active medication list.  Aspirin use is not indicated based on review of current medical condition/s. Risk of harm outweighs potential benefits.  .    Patient Active Problem List   Diagnosis    Left hand weakness    Eczema    Thoracic aortic aneurysm, without rupture    Hypotestosteronism    Essential hypertension    Paroxysmal A-fib    Elevated prostate specific antigen (PSA)    Nocturia    BPH with obstruction/lower urinary tract symptoms    Dupuytren's disease of palm of right hand    Screening for malignant neoplasm of colon    History of colonic polyps    Aneurysm of aortic root    Arthritis    Depression with anxiety     Advance Care Planning   Advance Care Planning     Advance Directive is not on file.  ACP discussion was held with the patient during this visit. Patient has an advance directive (not in EMR), copy requested. Patient does not have an advance directive, information provided.     Objective    Vitals:    01/18/24 0747   BP: 138/90  "  Pulse: 96   Temp: 97.1 °F (36.2 °C)   SpO2: 97%   Weight: 104 kg (229 lb)   Height: 190.5 cm (75\")     Estimated body mass index is 28.62 kg/m² as calculated from the following:    Height as of this encounter: 190.5 cm (75\").    Weight as of this encounter: 104 kg (229 lb).           Does the patient have evidence of cognitive impairment? No          HEALTH RISK ASSESSMENT    Smoking Status:  Social History     Tobacco Use   Smoking Status Never   Smokeless Tobacco Never     Alcohol Consumption:  Social History     Substance and Sexual Activity   Alcohol Use Never     Fall Risk Screen:    STEADI Fall Risk Assessment was completed, and patient is at LOW risk for falls.Assessment completed on:2024    Depression Screenin/18/2024     7:50 AM   PHQ-2/PHQ-9 Depression Screening   Little Interest or Pleasure in Doing Things 0-->not at all   Feeling Down, Depressed or Hopeless 0-->not at all   PHQ-9: Brief Depression Severity Measure Score 0       Health Habits and Functional and Cognitive Screenin/18/2024     7:00 AM   Functional & Cognitive Status   Do you have difficulty preparing food and eating? No   Do you have difficulty bathing yourself, getting dressed or grooming yourself? No   Do you have difficulty using the toilet? No   Do you have difficulty moving around from place to place? No   Do you have trouble with steps or getting out of a bed or a chair? No   Current Diet Limited Junk Food   Dental Exam Up to date   Eye Exam Other   Exercise (times per week) 6 times per week   Current Exercises Include Other   Do you need help using the phone?  No   Are you deaf or do you have serious difficulty hearing?  No   Do you need help to go to places out of walking distance? No   Do you need help shopping? No   Do you need help preparing meals?  No   Do you need help with housework?  No   Do you need help with laundry? No   Do you need help taking your medications? No   Do you need help managing " money? No   Do you ever drive or ride in a car without wearing a seat belt? No   Have you felt unusual stress, anger or loneliness in the last month? No   Who do you live with? Spouse   If you need help, do you have trouble finding someone available to you? No   Have you been bothered in the last four weeks by sexual problems? No   Do you have difficulty concentrating, remembering or making decisions? No       Age-appropriate Screening Schedule:  Refer to the list below for future screening recommendations based on patient's age, sex and/or medical conditions. Orders for these recommended tests are listed in the plan section. The patient has been provided with a written plan.    Health Maintenance   Topic Date Due    DIABETIC FOOT EXAM  Never done    DIABETIC EYE EXAM  Never done    HEMOGLOBIN A1C  11/26/2022    URINE MICROALBUMIN  07/07/2023    BMI FOLLOWUP  03/16/2024    ANNUAL WELLNESS VISIT  01/18/2025    COLORECTAL CANCER SCREENING  04/07/2028    TDAP/TD VACCINES (5 - Td or Tdap) 10/23/2033    HEPATITIS C SCREENING  Completed    COVID-19 Vaccine  Completed    INFLUENZA VACCINE  Completed    Pneumococcal Vaccine 65+  Completed    ZOSTER VACCINE  Completed                  CMS Preventative Services Quick Reference  Risk Factors Identified During Encounter  None Identified  The above risks/problems have been discussed with the patient.  Pertinent information has been shared with the patient in the After Visit Summary.  An After Visit Summary and PPPS were made available to the patient.    Follow Up:   Next Medicare Wellness visit to be scheduled in 1 year.       Additional E&M Note during same encounter follows:  Patient has multiple medical problems which are significant and separately identifiable that require additional work above and beyond the Medicare Wellness Visit.      Chief Complaint  Establish Care and Annual Exam    Subjective        HPI  Poncho Hernandez is also being seen today for HTN, DM-2 Diet  "controlled.          Objective   Vital Signs:  /90   Pulse 96   Temp 97.1 °F (36.2 °C)   Ht 190.5 cm (75\")   Wt 104 kg (229 lb)   SpO2 97%   BMI 28.62 kg/m²     Physical Exam  Constitutional:       General: He is not in acute distress.     Appearance: Normal appearance. He is not toxic-appearing.   HENT:      Head: Normocephalic and atraumatic.      Right Ear: Tympanic membrane normal.      Left Ear: Tympanic membrane normal.      Nose: Nose normal.      Mouth/Throat:      Mouth: Mucous membranes are moist.   Eyes:      General: No scleral icterus.     Extraocular Movements: Extraocular movements intact.      Conjunctiva/sclera: Conjunctivae normal.      Pupils: Pupils are equal, round, and reactive to light.   Cardiovascular:      Rate and Rhythm: Normal rate and regular rhythm.      Pulses: Normal pulses.      Heart sounds: Normal heart sounds. No murmur heard.     No gallop.   Pulmonary:      Effort: Pulmonary effort is normal. No respiratory distress.   Abdominal:      General: Abdomen is flat. Bowel sounds are normal. There is no distension.      Palpations: Abdomen is soft.   Musculoskeletal:         General: Normal range of motion.      Cervical back: Normal range of motion.   Skin:     General: Skin is warm and dry.      Capillary Refill: Capillary refill takes less than 2 seconds.   Neurological:      General: No focal deficit present.      Mental Status: He is alert.   Psychiatric:         Mood and Affect: Mood normal.          The following data was reviewed by: Tri Riddle MD on 01/18/2024:  CBC w/diff          2/25/2023    09:11   CBC w/Diff   WBC 6.07    RBC 4.04    Hemoglobin 12.9    Hematocrit 38.3    MCV 94.8    MCH 31.9    MCHC 33.7    RDW 14.2    Platelets 210    Neutrophil Rel % 70.1    Immature Granulocyte Rel % 0.3    Lymphocyte Rel % 18.6    Monocyte Rel % 8.1    Eosinophil Rel % 2.1    Basophil Rel % 0.8          Electrolytes          2/25/2023    09:11   Electrolytes   Sodium " 139    Potassium 4.5    Chloride 104    Calcium 9.0      Data reviewed : Radiologic studies MRI of Pelvis 12/23, Cardiology studies Echo , and Consultant notes Urology, Cardiology.           Assessment and Plan   Diagnoses and all orders for this visit:    1. Medicare annual wellness visit, subsequent (Primary)  -     Pneumococcal Conjugate Vaccine 20-Valent (PCV20)  -     CBC & Differential  -     Comprehensive Metabolic Panel  -     Hemoglobin A1c  -     Lipid Panel  -     TSH    2. Need for pneumococcal vaccination  -     Pneumococcal Conjugate Vaccine 20-Valent (PCV20)    3. Hypertension, unspecified type  -     CBC & Differential  -     Comprehensive Metabolic Panel    4. Screening for diabetes mellitus  -     Hemoglobin A1c    5. Screening for hyperlipidemia  -     Lipid Panel    6. Type 2 diabetes mellitus without complication, without long-term current use of insulin  -     Hemoglobin A1c      Patient currently only taking lisinopril, he states that he has diet controlled DM-2 for the last 15 to 20 years, he continues to exercise daily 30 distant 60 minutes of exercise, he does not have any acute complaints.  Is otherwise feeling well he discontinued taking his Eliquis he currently takes fish oil as well as garlic for blood thinning.     I spent 35 minutes caring for [unfilled] on this date of service. This time includes time spent by me in the following activities:preparing for the visit, reviewing tests, and obtaining and/or reviewing a separately obtained history  Follow Up   No follow-ups on file.  Patient was given instructions and counseling regarding his condition or for health maintenance advice. Please see specific information pulled into the AVS if appropriate.

## 2024-01-18 NOTE — PROGRESS NOTES
"Chief Complaint  Establish Care and Annual Exam    Subjective      History of Present Illness  Poncho Hernandez is a 68 y.o. male who presents to [facility]   Past Medical History:   Diagnosis Date    Abnormal ECG     Aftercare following bilateral knee joint replacement surgery 04/03/2018    Aneurysm     Arrhythmia     Arthritis     Bladder disorder     BPH (benign prostatic hyperplasia)     Elevated PSA     MANAGED WITHOUT MEDICATIONS, SITUATIONAL    Forgetfulness     H/O hernia repair     Heart disease     Heart murmur     High blood pressure     Hypertension     Limb swelling     Low testosterone in male     Night sweats     Paroxysmal A-fib 08/01/2021    Prostate disorder     Sleep apnea          Objective   Vital Signs:   Vitals:    01/18/24 0747   BP: 138/90   Temp: 97.1 °F (36.2 °C)   Weight: 104 kg (229 lb)   Height: 190.5 cm (75\")     Body mass index is 28.62 kg/m².    Wt Readings from Last 3 Encounters:   01/18/24 104 kg (229 lb)   11/06/23 103 kg (226 lb)   05/01/23 102 kg (225 lb 12.8 oz)     BP Readings from Last 3 Encounters:   01/18/24 138/90   11/06/23 123/78   04/07/23 124/88       Health Maintenance   Topic Date Due    Pneumococcal Vaccine 65+ (3 of 3 - PPSV23 or PCV20) 03/08/2023    ANNUAL WELLNESS VISIT  11/03/2023    BMI FOLLOWUP  03/16/2024    COLORECTAL CANCER SCREENING  04/07/2028    TDAP/TD VACCINES (5 - Td or Tdap) 10/23/2033    HEPATITIS C SCREENING  Completed    COVID-19 Vaccine  Completed    INFLUENZA VACCINE  Completed    ZOSTER VACCINE  Completed       Physical Exam       Result Review :  The following data was reviewed by: Tri Riddle MD on 01/18/2024:      Procedures        Assessment and Plan   There are no diagnoses linked to this encounter.              FOLLOW UP  No follow-ups on file.  Patient was given instructions and counseling regarding his condition or for health maintenance advice. Please see specific information pulled into the AVS " if appropriate.       Tri Riddle MD  01/18/24  07:48 EST    CURRENT & DISCONTINUED MEDICATIONS  Current Outpatient Medications   Medication Instructions    clobetasol (TEMOVATE) 0.05 % ointment APPLY A THIN LAYER TO AFFECTED AREA ON LEFT LEG TWICE DAILY AS NEEDED    Eliquis 5 mg, 2 Times Daily    ibuprofen (ADVIL,MOTRIN) 800 mg, Oral, 3 Times Daily    lisinopril (PRINIVIL,ZESTRIL) 40 MG tablet     losartan (COZAAR) 50 mg, Oral, 2 Times Daily    propranolol (INDERAL) 10 mg, Oral, 2 Times Daily    tamsulosin (FLOMAX) 0.4 mg, Oral, Daily    tiZANidine (ZANAFLEX) 4 MG tablet No dose, route, or frequency recorded.    traMADol (ULTRAM) 50 mg, Oral, Every 8 Hours PRN    traMADol (ULTRAM) 50 mg, Oral, Every 4 Hours PRN    triamcinolone (KENALOG) 0.1 % ointment APPLY THIN LAYER TOPICALLY TO THE AFFECTED AREA TWICE DAILY AS NEEDED       There are no discontinued medications.

## 2024-01-29 ENCOUNTER — TRANSCRIBE ORDERS (OUTPATIENT)
Dept: ADMINISTRATIVE | Facility: HOSPITAL | Age: 69
End: 2024-01-29
Payer: MEDICARE

## 2024-01-29 DIAGNOSIS — I71.21 ANEURYSM OF AORTIC ROOT: ICD-10-CM

## 2024-01-29 DIAGNOSIS — I71.20 THORACIC AORTIC ANEURYSM WITHOUT RUPTURE, UNSPECIFIED PART: Primary | ICD-10-CM

## 2024-02-13 ENCOUNTER — HOSPITAL ENCOUNTER (OUTPATIENT)
Dept: CT IMAGING | Facility: HOSPITAL | Age: 69
Discharge: HOME | End: 2024-02-13
Admitting: THORACIC SURGERY (CARDIOTHORACIC VASCULAR SURGERY)
Payer: MEDICARE

## 2024-02-13 DIAGNOSIS — I71.20 THORACIC AORTIC ANEURYSM WITHOUT RUPTURE, UNSPECIFIED PART: ICD-10-CM

## 2024-02-13 PROCEDURE — 71250 CT THORAX DX C-: CPT

## 2024-02-19 DIAGNOSIS — R97.20 ELEVATED PROSTATE SPECIFIC ANTIGEN (PSA): Primary | ICD-10-CM

## 2024-08-09 ENCOUNTER — LAB (OUTPATIENT)
Dept: LAB | Facility: HOSPITAL | Age: 69
End: 2024-08-09
Payer: MEDICARE

## 2024-08-09 DIAGNOSIS — R97.20 ELEVATED PROSTATE SPECIFIC ANTIGEN (PSA): ICD-10-CM

## 2024-08-09 LAB — PSA SERPL-MCNC: 5.1 NG/ML (ref 0–4)

## 2024-08-09 PROCEDURE — 36415 COLL VENOUS BLD VENIPUNCTURE: CPT

## 2024-08-09 PROCEDURE — 84153 ASSAY OF PSA TOTAL: CPT

## 2024-08-14 NOTE — PROGRESS NOTES
Chief Complaint: Benign Prostatic Hypertrophy    Subjective         History of Present Illness  Poncho Hernandez is a 68 y.o. male presents to [facility] to be seen for follow-up.    Patient was previously seen by Dr. Monica Phillips with last visit on 11/6/2023 for elevated PSA/BPH.  At that visit it was discussed that his PSA had fluctuated between 6 and 7 for the past few years but had been as high as 9.  At that time it was at 8.  He did have a negative prostate biopsy in 2011.  His previous MRI was in December 2023.  He is here for follow-up.    He reports he does have urgency and frequency. He did have a TURP completed but didn't see much improvement. He reports it is only a problem when he gets sick. He reports when he gets exercise his symptoms are fine.     PSA  8/9/2024 5.1, PSAd 0.06  10/30/2023 8.1  1/11/2023 6.93  12/23/2020 6.34  12/17/2020 8.22  07/07/2020 4.72  10/31/2019 5.59      EXAMINATION:   1. MAGNETIC RESONANCE IMAGING OF THE PELVIS WITHOUT AND WITH CONTRAST 12/11/2023  2. THREE DIMENSIONAL RECONSTRUCTION FOR PROSTATE VOLUME (SEPARATE   MODALITY)     HISTORY: Elevated PSA, history of prior negative prostate biopsy,   additional history of prior TURP     COMPARISON: None     FINDINGS:   Prostate volume: 89 cc     The prostate transition zone is markedly enlarged with benign   prostatic hyperplasia. There is significant associated median lobe   hypertrophy slightly asymmetric to the right with small TURP defect.   Diffuse mild thinning of the peripheral zone most pronounced at the   apex.     The prostate was assessed using the PI-RADS 2 scoring system.     There are no suspicious lesions in the prostate (PI-RADS 3 or   greater). Few scattered linear areas of T2 hypointensity within the   peripheral zone without focal diffusion restriction (PI-RADS 2).     Staging Information: No enlarged lymph nodes are identified. No   suspicious osseous lesions are identified.      Other findings: The urinary bladder and seminal vesicles are   unremarkable. Bilateral hamstring and gluteus medius tendinosis.     IMPRESSION:   Marked benign prostatic hyperplasia without suspicious lesions per   PI-RADS criteria.       Dictated by: Rikki Hay D.O.     Previous 11/6/2023:  He has had an up and down elevated PSA for years with a negative prostate biopsy in 2011.  At that time his PSA was 5.7.  It had been as high as 6.98 in 2014.  It was 3.84 in Dec 2018 and then 9.10 in Feb 2019 in a repeat check.  It was 6.68 in April 2019 after Bactrim for 4 weeks.  He also had another round of Cipro for a UTI.       He had a button TURP that was unremarkable in 2018.     Pelvic ultrasound in 2017 revealed a 5 x 6 x 4 cm prostate.           Objective     Past Medical History:   Diagnosis Date    Abnormal ECG     Aftercare following bilateral knee joint replacement surgery 04/03/2018    Aneurysm     Arrhythmia     Arthritis     Bladder disorder     BPH (benign prostatic hyperplasia)     Elevated PSA     MANAGED WITHOUT MEDICATIONS, SITUATIONAL    Forgetfulness     H/O hernia repair     Heart disease     Heart murmur     High blood pressure     Hypertension     Limb swelling     Low testosterone in male     Night sweats     Paroxysmal A-fib 08/01/2021    Prostate disorder     Sleep apnea        Past Surgical History:   Procedure Laterality Date    ADENOIDECTOMY  1958    COLONOSCOPY      COLONOSCOPY N/A 4/7/2023    Procedure: COLONOSCOPY;  Surgeon: Kenneth Damico MD;  Location: [facility] ENDOSCOPY;  Service: General;  Laterality: N/A;  very large prostate, normal colon    HAND SURGERY Left 2005    HERNIA REPAIR      JOINT REPLACEMENT      ARTIFICIAL JOINTS/LIMBS    OTHER SURGICAL HISTORY Left     ARM SURGERY    OTHER SURGICAL HISTORY      METAL IMPLANTS    PROSTATE BIOPSY      REPLACEMENT TOTAL KNEE BILATERAL  03/19/2018    BY LEVI    TURP / TRANSURETHRAL INCISION / DRAINAGE PROSTATE  05/31/2018          Current Outpatient Medications:     lisinopril (PRINIVIL,ZESTRIL) 40 MG tablet, , Disp: , Rfl:     No Known Allergies     Family History   Problem Relation Age of Onset    Stroke Mother     Heart disease Mother     Hypertension Mother     Osteoporosis Mother     Stroke Father     Heart disease Father     Cancer Father     Heart attack Father     Prostate cancer Father 88    Osteoporosis Father     Arthritis Father     Arthritis Sister     Arthritis Brother     Hypertension Maternal Grandmother     Colon cancer Other     Colon cancer Other     Arthritis Son        Social History     Socioeconomic History    Marital status:    Tobacco Use    Smoking status: Never     Passive exposure: Never    Smokeless tobacco: Never   Vaping Use    Vaping status: Never Used   Substance and Sexual Activity    Alcohol use: Never    Drug use: Never    Sexual activity: Defer       Vital Signs:   /97 (BP Location: Right arm, Patient Position: Sitting, Cuff Size: Adult)   Pulse 92      Physical Exam  Vitals reviewed.   Constitutional:       Appearance: Normal appearance.   Neurological:      General: No focal deficit present.      Mental Status: He is alert and oriented to person, place, and time.   Psychiatric:         Mood and Affect: Mood normal.         Behavior: Behavior normal.          Result Review :   The following data was reviewed by: TON Hollis on 08/16/2024:  Results for orders placed or performed in visit on 08/16/24   Bladder Scan   Result Value Ref Range    Urine Volume 64       PSA          10/30/2023    15:58 8/9/2024    10:22   PSA   PSA 8.100  5.100          Bladder Scan interpretation 08/16/2024    Estimation of residual urine via BVI 3000 [address] Bladder Scan  MA/nurse performing: Brianna JIMENEZ MA  Residual Urine: 64 ml  Indication: BPH with obstruction/lower urinary tract symptoms    Elevated prostate specific antigen (PSA)   Position: Supine  Examination: Incremental scanning  of the suprapubic area using 2.0 MHz transducer using copious amounts of acoustic gel.   Findings: An anechoic area was demonstrated which represented the bladder, with measurement of residual urine as noted. I inspected this myself. In that the residual urine was stable or insignificant, refer to plan for treatment and plan necessary at this time.           Procedures        Assessment and Plan    Diagnoses and all orders for this visit:    1. BPH with obstruction/lower urinary tract symptoms (Primary)  -     Bladder Scan    2. Elevated prostate specific antigen (PSA)  -     PSA DIAGNOSTIC; Future    Given that his PSA has come down and his PSA density is within normal limits, we will plan to repeat his PSA in 6 months.    He is not currently bothered by symptoms and is not interested in any medication at this time.    I will see him back in 6 months with PSA prior.    Follow Up   Return in about 6 months (around 2/16/2025) for with PSA prior.  Patient was given instructions and counseling regarding his condition or for health maintenance advice. Please see specific information pulled into the AVS if appropriate.         This document has been electronically signed by TON Hollis  August 16, 2024 10:04 EDT

## 2024-08-16 ENCOUNTER — OFFICE VISIT (OUTPATIENT)
Dept: UROLOGY | Facility: CLINIC | Age: 69
End: 2024-08-16
Payer: MEDICARE

## 2024-08-16 VITALS — SYSTOLIC BLOOD PRESSURE: 149 MMHG | DIASTOLIC BLOOD PRESSURE: 97 MMHG | HEART RATE: 92 BPM

## 2024-08-16 DIAGNOSIS — R97.20 ELEVATED PROSTATE SPECIFIC ANTIGEN (PSA): ICD-10-CM

## 2024-08-16 DIAGNOSIS — N40.1 BPH WITH OBSTRUCTION/LOWER URINARY TRACT SYMPTOMS: Primary | ICD-10-CM

## 2024-08-16 DIAGNOSIS — N13.8 BPH WITH OBSTRUCTION/LOWER URINARY TRACT SYMPTOMS: Primary | ICD-10-CM

## 2024-08-16 LAB — URINE VOLUME: 64

## 2024-11-01 ENCOUNTER — TELEPHONE (OUTPATIENT)
Dept: FAMILY MEDICINE CLINIC | Facility: CLINIC | Age: 69
End: 2024-11-01
Payer: MEDICARE

## 2024-11-01 NOTE — TELEPHONE ENCOUNTER
I called pt to change appt to in office procedure to accommodate for time. Pt understands Dr Riddle will evaluate warts if they can be removed that day.

## 2024-11-01 NOTE — TELEPHONE ENCOUNTER
Caller: Poncho Hernandez    Relationship to patient: Self    Best call back number: [phone]    Chief complaint: N/A    Type of visit: IN OFFICE PROCEDURE    Requested date: 11/21/2024     If rescheduling, when is the original appointment: N/A     Additional notes:PATIENT NEEDS IN OFFICE PROCEDURE OF SEVERAL WARTS REMOVED FROM BACK OF HIS LEFT KNEE. HE HAS OFFICE VISIT SCHEDULED FOR 11/21/2024 AT 10:30. PLEASE ALSO SCHEDULE IN OFFICE PROCEDURE FOR THIS PATIENT.

## 2024-11-06 ENCOUNTER — DOCUMENTATION (OUTPATIENT)
Dept: FAMILY MEDICINE CLINIC | Facility: CLINIC | Age: 69
End: 2024-11-06
Payer: MEDICARE

## 2024-11-06 NOTE — PROGRESS NOTES
Patient has been erroneously marked as diabetic. Based on the available clinical information, he does not have diabetes and should therefore be excluded from diabetic health maintenance and quality measures for the remainder of the reporting period.

## 2024-11-21 ENCOUNTER — OFFICE VISIT (OUTPATIENT)
Dept: FAMILY MEDICINE CLINIC | Facility: CLINIC | Age: 69
End: 2024-11-21
Payer: MEDICARE

## 2024-11-21 VITALS
HEIGHT: 74 IN | SYSTOLIC BLOOD PRESSURE: 122 MMHG | WEIGHT: 210.1 LBS | DIASTOLIC BLOOD PRESSURE: 74 MMHG | TEMPERATURE: 97.8 F | OXYGEN SATURATION: 98 % | BODY MASS INDEX: 26.96 KG/M2 | HEART RATE: 88 BPM

## 2024-11-21 DIAGNOSIS — M72.0 DUPUYTREN'S CONTRACTURE OF RIGHT HAND: ICD-10-CM

## 2024-11-21 DIAGNOSIS — M15.1 HEBERDEN'S NODES: ICD-10-CM

## 2024-11-21 DIAGNOSIS — R73.03 PREDIABETES: Primary | ICD-10-CM

## 2024-11-21 DIAGNOSIS — L90.5 SCARS: ICD-10-CM

## 2024-11-21 RX ORDER — TRIAMCINOLONE ACETONIDE 40 MG/ML
10 INJECTION, SUSPENSION INTRA-ARTICULAR; INTRAMUSCULAR
Status: COMPLETED | OUTPATIENT
Start: 2024-11-21 | End: 2024-11-21

## 2024-11-21 RX ORDER — HYDROQUINONE 40 MG/G
1 CREAM TOPICAL 2 TIMES DAILY
Qty: 60 G | Refills: 0 | Status: SHIPPED | OUTPATIENT
Start: 2024-11-21 | End: 2024-12-21

## 2024-11-21 RX ADMIN — TRIAMCINOLONE ACETONIDE 10 MG: 40 INJECTION, SUSPENSION INTRA-ARTICULAR; INTRAMUSCULAR at 14:24

## 2024-11-25 ENCOUNTER — LAB (OUTPATIENT)
Dept: LAB | Facility: HOSPITAL | Age: 69
End: 2024-11-25
Payer: MEDICARE

## 2024-11-25 ENCOUNTER — HOSPITAL ENCOUNTER (OUTPATIENT)
Dept: GENERAL RADIOLOGY | Facility: HOSPITAL | Age: 69
Discharge: HOME | End: 2024-11-25
Payer: MEDICARE

## 2024-11-25 DIAGNOSIS — M15.1 HEBERDEN'S NODES: ICD-10-CM

## 2024-11-25 DIAGNOSIS — R73.03 PREDIABETES: ICD-10-CM

## 2024-11-25 LAB — HBA1C MFR BLD: 5.5 % (ref 4.8–5.6)

## 2024-11-25 PROCEDURE — 83036 HEMOGLOBIN GLYCOSYLATED A1C: CPT

## 2024-11-25 PROCEDURE — 73130 X-RAY EXAM OF HAND: CPT

## 2024-11-25 PROCEDURE — 36415 COLL VENOUS BLD VENIPUNCTURE: CPT

## 2025-01-20 ENCOUNTER — OFFICE VISIT (OUTPATIENT)
Dept: FAMILY MEDICINE CLINIC | Facility: CLINIC | Age: 70
End: 2025-01-20
Payer: MEDICARE

## 2025-01-20 ENCOUNTER — TRANSCRIBE ORDERS (OUTPATIENT)
Dept: ADMINISTRATIVE | Facility: HOSPITAL | Age: 70
End: 2025-01-20
Payer: MEDICARE

## 2025-01-20 VITALS
OXYGEN SATURATION: 98 % | WEIGHT: 209 LBS | HEIGHT: 74 IN | TEMPERATURE: 97.3 F | HEART RATE: 80 BPM | BODY MASS INDEX: 26.82 KG/M2

## 2025-01-20 DIAGNOSIS — N32.89 BLADDER SPASMS: ICD-10-CM

## 2025-01-20 DIAGNOSIS — I10 HYPERTENSION, UNSPECIFIED TYPE: ICD-10-CM

## 2025-01-20 DIAGNOSIS — I71.21 ANEURYSM OF ASCENDING AORTA WITHOUT RUPTURE: Primary | ICD-10-CM

## 2025-01-20 DIAGNOSIS — M15.1 HEBERDEN'S NODES: ICD-10-CM

## 2025-01-20 DIAGNOSIS — Q25.43 ANEURYSM OF AORTIC ROOT: ICD-10-CM

## 2025-01-20 DIAGNOSIS — M72.0 DUPUYTREN'S CONTRACTURE OF RIGHT HAND: ICD-10-CM

## 2025-01-20 DIAGNOSIS — R73.03 PREDIABETES: ICD-10-CM

## 2025-01-20 DIAGNOSIS — Z13.220 SCREENING FOR LIPID DISORDERS: ICD-10-CM

## 2025-01-20 DIAGNOSIS — Z00.00 MEDICARE ANNUAL WELLNESS VISIT, SUBSEQUENT: Primary | ICD-10-CM

## 2025-01-20 PROCEDURE — 1126F AMNT PAIN NOTED NONE PRSNT: CPT | Performed by: STUDENT IN AN ORGANIZED HEALTH CARE EDUCATION/TRAINING PROGRAM

## 2025-01-20 PROCEDURE — G0439 PPPS, SUBSEQ VISIT: HCPCS | Performed by: STUDENT IN AN ORGANIZED HEALTH CARE EDUCATION/TRAINING PROGRAM

## 2025-01-20 PROCEDURE — 1170F FXNL STATUS ASSESSED: CPT | Performed by: STUDENT IN AN ORGANIZED HEALTH CARE EDUCATION/TRAINING PROGRAM

## 2025-01-20 PROCEDURE — 1159F MED LIST DOCD IN RCRD: CPT | Performed by: STUDENT IN AN ORGANIZED HEALTH CARE EDUCATION/TRAINING PROGRAM

## 2025-01-20 PROCEDURE — 99214 OFFICE O/P EST MOD 30 MIN: CPT | Performed by: STUDENT IN AN ORGANIZED HEALTH CARE EDUCATION/TRAINING PROGRAM

## 2025-01-20 PROCEDURE — 1160F RVW MEDS BY RX/DR IN RCRD: CPT | Performed by: STUDENT IN AN ORGANIZED HEALTH CARE EDUCATION/TRAINING PROGRAM

## 2025-01-20 RX ORDER — OXYBUTYNIN CHLORIDE 5 MG/1
5 TABLET, EXTENDED RELEASE ORAL DAILY
Qty: 90 TABLET | Refills: 3 | Status: SHIPPED | OUTPATIENT
Start: 2025-01-20 | End: 2025-01-20

## 2025-01-20 RX ORDER — OXYBUTYNIN CHLORIDE 5 MG/1
5 TABLET, EXTENDED RELEASE ORAL DAILY
Qty: 90 TABLET | Refills: 3 | Status: SHIPPED | OUTPATIENT
Start: 2025-01-20 | End: 2025-04-20
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
## 2025-01-21 ENCOUNTER — LAB (OUTPATIENT)
Dept: LAB | Facility: HOSPITAL | Age: 70
End: 2025-01-21
Payer: MEDICARE

## 2025-01-21 DIAGNOSIS — R97.20 ELEVATED PROSTATE SPECIFIC ANTIGEN (PSA): ICD-10-CM

## 2025-01-21 DIAGNOSIS — R73.03 PREDIABETES: ICD-10-CM

## 2025-01-21 LAB
ALBUMIN SERPL-MCNC: 3.9 G/DL (ref 3.5–5.2)
ALBUMIN/GLOB SERPL: 1.2 G/DL
ALP SERPL-CCNC: 74 U/L (ref 39–117)
ALT SERPL W P-5'-P-CCNC: 32 U/L (ref 1–41)
ANION GAP SERPL CALCULATED.3IONS-SCNC: 11.1 MMOL/L (ref 5–15)
AST SERPL-CCNC: 42 U/L (ref 1–40)
BASOPHILS # BLD AUTO: 0.05 10*3/MM3 (ref 0–0.2)
BASOPHILS NFR BLD AUTO: 1 % (ref 0–1.5)
BILIRUB SERPL-MCNC: 1.2 MG/DL (ref 0–1.2)
BUN SERPL-MCNC: 23 MG/DL (ref 8–23)
BUN/CREAT SERPL: 21.1 (ref 7–25)
CALCIUM SPEC-SCNC: 9.2 MG/DL (ref 8.6–10.5)
CHLORIDE SERPL-SCNC: 101 MMOL/L (ref 98–107)
CHOLEST SERPL-MCNC: 150 MG/DL (ref 0–200)
CO2 SERPL-SCNC: 26.9 MMOL/L (ref 22–29)
CREAT SERPL-MCNC: 1.09 MG/DL (ref 0.76–1.27)
DEPRECATED RDW RBC AUTO: 47.5 FL (ref 37–54)
EGFRCR SERPLBLD CKD-EPI 2021: 72.6 ML/MIN/1.73
EOSINOPHIL # BLD AUTO: 0.05 10*3/MM3 (ref 0–0.4)
EOSINOPHIL NFR BLD AUTO: 1 % (ref 0.3–6.2)
ERYTHROCYTE [DISTWIDTH] IN BLOOD BY AUTOMATED COUNT: 13.7 % (ref 12.3–15.4)
GLOBULIN UR ELPH-MCNC: 3.2 GM/DL
GLUCOSE SERPL-MCNC: 103 MG/DL (ref 65–99)
HBA1C MFR BLD: 5.9 % (ref 4.8–5.6)
HCT VFR BLD AUTO: 42.7 % (ref 37.5–51)
HDLC SERPL-MCNC: 56 MG/DL (ref 40–60)
HGB BLD-MCNC: 13.5 G/DL (ref 13–17.7)
IMM GRANULOCYTES # BLD AUTO: 0.01 10*3/MM3 (ref 0–0.05)
IMM GRANULOCYTES NFR BLD AUTO: 0.2 % (ref 0–0.5)
LDLC SERPL CALC-MCNC: 85 MG/DL (ref 0–100)
LDLC/HDLC SERPL: 1.54 {RATIO}
LYMPHOCYTES # BLD AUTO: 1.03 10*3/MM3 (ref 0.7–3.1)
LYMPHOCYTES NFR BLD AUTO: 21.2 % (ref 19.6–45.3)
MCH RBC QN AUTO: 29.7 PG (ref 26.6–33)
MCHC RBC AUTO-ENTMCNC: 31.6 G/DL (ref 31.5–35.7)
MCV RBC AUTO: 94.1 FL (ref 79–97)
MONOCYTES # BLD AUTO: 0.44 10*3/MM3 (ref 0.1–0.9)
MONOCYTES NFR BLD AUTO: 9.1 % (ref 5–12)
NEUTROPHILS NFR BLD AUTO: 3.28 10*3/MM3 (ref 1.7–7)
NEUTROPHILS NFR BLD AUTO: 67.5 % (ref 42.7–76)
NRBC BLD AUTO-RTO: 0 /100 WBC (ref 0–0.2)
PLATELET # BLD AUTO: 216 10*3/MM3 (ref 140–450)
PMV BLD AUTO: 10.9 FL (ref 6–12)
POTASSIUM SERPL-SCNC: 4.4 MMOL/L (ref 3.5–5.2)
PROT SERPL-MCNC: 7.1 G/DL (ref 6–8.5)
PSA SERPL-MCNC: 5.88 NG/ML (ref 0–4)
RBC # BLD AUTO: 4.54 10*6/MM3 (ref 4.14–5.8)
SODIUM SERPL-SCNC: 139 MMOL/L (ref 136–145)
TRIGL SERPL-MCNC: 38 MG/DL (ref 0–150)
TSH SERPL DL<=0.05 MIU/L-ACNC: 4.47 UIU/ML (ref 0.27–4.2)
VLDLC SERPL-MCNC: 9 MG/DL (ref 5–40)
WBC NRBC COR # BLD AUTO: 4.86 10*3/MM3 (ref 3.4–10.8)

## 2025-01-21 PROCEDURE — 85025 COMPLETE CBC W/AUTO DIFF WBC: CPT | Performed by: STUDENT IN AN ORGANIZED HEALTH CARE EDUCATION/TRAINING PROGRAM

## 2025-01-21 PROCEDURE — 80053 COMPREHEN METABOLIC PANEL: CPT | Performed by: STUDENT IN AN ORGANIZED HEALTH CARE EDUCATION/TRAINING PROGRAM

## 2025-01-21 PROCEDURE — 84443 ASSAY THYROID STIM HORMONE: CPT | Performed by: STUDENT IN AN ORGANIZED HEALTH CARE EDUCATION/TRAINING PROGRAM

## 2025-01-21 PROCEDURE — 36415 COLL VENOUS BLD VENIPUNCTURE: CPT | Performed by: STUDENT IN AN ORGANIZED HEALTH CARE EDUCATION/TRAINING PROGRAM

## 2025-01-21 PROCEDURE — 83036 HEMOGLOBIN GLYCOSYLATED A1C: CPT

## 2025-01-21 PROCEDURE — 84153 ASSAY OF PSA TOTAL: CPT

## 2025-01-21 PROCEDURE — 80061 LIPID PANEL: CPT | Performed by: STUDENT IN AN ORGANIZED HEALTH CARE EDUCATION/TRAINING PROGRAM

## 2025-02-13 NOTE — PROGRESS NOTES
Chief Complaint: BPH with obstruction/lower urinary tract symptoms    Subjective         History of Present Illness  Poncho Hernandez is a 69 y.o. male presents to [facility] to be seen for follow-up.    Patient was previously seen by me with last visit on 8/16/2024 for BPH and elevated PSA.  His PSA density was within normal limits and so we discussed repeating his PSA in 6 months.  He was not currently bothered by any symptoms and is on no medications for BPH.  He is here for follow-up.    He was started back on oxybutynin and his visual disturbances returned. He reports that he has lowered his dose and his symptoms are improved without the visual disturbances.     PSA  1/21/2025 5.88, PSAd 0.07    Previous 8/16/2024:  Patient was previously seen by Dr. Monica Phillips with last visit on 11/6/2023 for elevated PSA/BPH.  At that visit it was discussed that his PSA had fluctuated between 6 and 7 for the past few years but had been as high as 9.  At that time it was at 8.  He did have a negative prostate biopsy in 2011.  His previous MRI was in December 2023.  He is here for follow-up.     He reports he does have urgency and frequency. He did have a TURP completed but didn't see much improvement. He reports it is only a problem when he gets sick. He reports when he gets exercise his symptoms are fine.      PSA  8/9/2024 5.1, PSAd 0.06  10/30/2023 8.1  1/11/2023 6.93  12/23/2020 6.34  12/17/2020 8.22  07/07/2020 4.72  10/31/2019 5.59        EXAMINATION:   1. MAGNETIC RESONANCE IMAGING OF THE PELVIS WITHOUT AND WITH CONTRAST 12/11/2023  2. THREE DIMENSIONAL RECONSTRUCTION FOR PROSTATE VOLUME (SEPARATE   MODALITY)     HISTORY: Elevated PSA, history of prior negative prostate biopsy,   additional history of prior TURP     COMPARISON: None     FINDINGS:   Prostate volume: 89 cc     The prostate transition zone is markedly enlarged with benign   prostatic hyperplasia. There is significant  associated median lobe   hypertrophy slightly asymmetric to the right with small TURP defect.   Diffuse mild thinning of the peripheral zone most pronounced at the   apex.     The prostate was assessed using the PI-RADS 2 scoring system.     There are no suspicious lesions in the prostate (PI-RADS 3 or   greater). Few scattered linear areas of T2 hypointensity within the   peripheral zone without focal diffusion restriction (PI-RADS 2).     Staging Information: No enlarged lymph nodes are identified. No   suspicious osseous lesions are identified.     Other findings: The urinary bladder and seminal vesicles are   unremarkable. Bilateral hamstring and gluteus medius tendinosis.     IMPRESSION:   Marked benign prostatic hyperplasia without suspicious lesions per   PI-RADS criteria.       Dictated by: Rikki Hay D.O.      Previous 11/6/2023:  He has had an up and down elevated PSA for years with a negative prostate biopsy in 2011.  At that time his PSA was 5.7.  It had been as high as 6.98 in 2014.  It was 3.84 in Dec 2018 and then 9.10 in Feb 2019 in a repeat check.  It was 6.68 in April 2019 after Bactrim for 4 weeks.  He also had another round of Cipro for a UTI.       He had a button TURP that was unremarkable in 2018.     Pelvic ultrasound in 2017 revealed a 5 x 6 x 4 cm prostate.              Objective     Past Medical History:   Diagnosis Date    Abnormal ECG     Aftercare following bilateral knee joint replacement surgery 04/03/2018    Aneurysm     Arrhythmia     Arthritis     Bladder disorder     BPH (benign prostatic hyperplasia)     Elevated PSA     MANAGED WITHOUT MEDICATIONS, SITUATIONAL    Forgetfulness     H/O hernia repair     Heart disease     Heart murmur     High blood pressure     Hypertension     Limb swelling     Low testosterone in male     Night sweats     Paroxysmal A-fib 08/01/2021    Prostate disorder     Sleep apnea        Past Surgical History:   Procedure Laterality Date     "ADENOIDECTOMY  1958    COLONOSCOPY      COLONOSCOPY N/A 4/7/2023    Procedure: COLONOSCOPY;  Surgeon: Kenneth Damico MD;  Location: [facility] ENDOSCOPY;  Service: General;  Laterality: N/A;  very large prostate, normal colon    HAND SURGERY Left 2005    HERNIA REPAIR      JOINT REPLACEMENT      ARTIFICIAL JOINTS/LIMBS    OTHER SURGICAL HISTORY Left     ARM SURGERY    OTHER SURGICAL HISTORY      METAL IMPLANTS    PROSTATE BIOPSY      REPLACEMENT TOTAL KNEE BILATERAL  03/19/2018    BY LEVI    TURP / TRANSURETHRAL INCISION / DRAINAGE PROSTATE  05/31/2018         Current Outpatient Medications:     lisinopril (PRINIVIL,ZESTRIL) 40 MG tablet, , Disp: , Rfl:     oxybutynin XL (Ditropan XL) 5 MG 24 hr tablet, Take 1 tablet by mouth Daily for 90 days., Disp: 90 tablet, Rfl: 3    No Known Allergies     Family History   Problem Relation Age of Onset    Stroke Mother     Heart disease Mother     Hypertension Mother     Osteoporosis Mother     Stroke Father     Heart disease Father     Cancer Father     Heart attack Father     Prostate cancer Father 88    Osteoporosis Father     Arthritis Father     Arthritis Sister     Arthritis Brother     Hypertension Maternal Grandmother     Colon cancer Other     Colon cancer Other     Arthritis Son        Social History     Socioeconomic History    Marital status:    Tobacco Use    Smoking status: Never     Passive exposure: Never    Smokeless tobacco: Never   Vaping Use    Vaping status: Never Used   Substance and Sexual Activity    Alcohol use: Never    Drug use: Never    Sexual activity: Defer       Vital Signs:   Resp 14   Ht 188 cm (74\")   Wt 94.8 kg (208 lb 15.9 oz)   BMI 26.83 kg/m²      Physical Exam  Vitals reviewed.   Constitutional:       Appearance: Normal appearance.   Neurological:      General: No focal deficit present.      Mental Status: He is alert and oriented to person, place, and time.   Psychiatric:         Mood and Affect: Mood normal.         Behavior: " Behavior normal.          Result Review :   The following data was reviewed by: TON Hollis on 02/17/2025:  Results for orders placed or performed in visit on 02/17/25   Bladder Scan    Collection Time: 02/17/25  9:18 AM   Result Value Ref Range    Urine Volume 29       PSA          8/9/2024    10:22 1/21/2025    09:09   PSA   PSA 5.100  5.880      Bladder Scan interpretation 02/17/2025    Estimation of residual urine via BVI 3000 [place]athon Bladder Scan  MA/nurse performing: Brianna JIMENEZ MA  Residual Urine: 29 ml  Indication: BPH with obstruction/lower urinary tract symptoms    Elevated prostate specific antigen (PSA)    Dysuria   Position: Supine  Examination: Incremental scanning of the suprapubic area using 2.0 MHz transducer using copious amounts of acoustic gel.   Findings: An anechoic area was demonstrated which represented the bladder, with measurement of residual urine as noted. I inspected this myself. In that the residual urine was stable or insignificant, refer to plan for treatment and plan necessary at this time.         Procedures        Assessment and Plan    Diagnoses and all orders for this visit:    1. BPH with obstruction/lower urinary tract symptoms (Primary)  -     Bladder Scan    2. Elevated prostate specific antigen (PSA)  -     PSA DIAGNOSTIC; Future    3. Dysuria  -     Urine Culture - Urine, Urine, Clean Catch; Future    Given that his PSA is stable and his density is within normal limits based on the size of his prostate, we discussed that we could continue to check every 6 months or we could go out to an annual PSA.  At this point he would like to go out to an annual PSA    He is having some bladder irritation so I am going to go ahead and culture his urine to ensure that there is no infection causing this.    We also discussed that with his urinary frequency we could consider other medicines besides oxybutynin if this continues to cause any bothersome symptoms or is not  effective.  At this time he is content with where he is symptom wise.    I plan to see him back in 1 year or sooner for new concerns.    Follow Up   No follow-ups on file.  Patient was given instructions and counseling regarding his condition or for health maintenance advice. Please see specific information pulled into the AVS if appropriate.         This document has been electronically signed by TON Hollis  February 17, 2025 09:31 EST

## 2025-02-17 ENCOUNTER — OFFICE VISIT (OUTPATIENT)
Dept: UROLOGY | Age: 70
End: 2025-02-17
Payer: MEDICARE

## 2025-02-17 VITALS — HEIGHT: 74 IN | BODY MASS INDEX: 26.82 KG/M2 | RESPIRATION RATE: 14 BRPM | WEIGHT: 209 LBS

## 2025-02-17 DIAGNOSIS — N40.1 BPH WITH OBSTRUCTION/LOWER URINARY TRACT SYMPTOMS: Primary | ICD-10-CM

## 2025-02-17 DIAGNOSIS — R97.20 ELEVATED PROSTATE SPECIFIC ANTIGEN (PSA): ICD-10-CM

## 2025-02-17 DIAGNOSIS — N13.8 BPH WITH OBSTRUCTION/LOWER URINARY TRACT SYMPTOMS: Primary | ICD-10-CM

## 2025-02-17 DIAGNOSIS — R30.0 DYSURIA: ICD-10-CM

## 2025-02-17 LAB — URINE VOLUME: 29

## 2025-02-17 PROCEDURE — 1160F RVW MEDS BY RX/DR IN RCRD: CPT | Performed by: NURSE PRACTITIONER

## 2025-02-17 PROCEDURE — 87086 URINE CULTURE/COLONY COUNT: CPT | Performed by: NURSE PRACTITIONER

## 2025-02-17 PROCEDURE — 99213 OFFICE O/P EST LOW 20 MIN: CPT | Performed by: NURSE PRACTITIONER

## 2025-02-17 PROCEDURE — 51798 US URINE CAPACITY MEASURE: CPT | Performed by: NURSE PRACTITIONER

## 2025-02-17 PROCEDURE — 1159F MED LIST DOCD IN RCRD: CPT | Performed by: NURSE PRACTITIONER

## 2025-02-18 ENCOUNTER — TELEPHONE (OUTPATIENT)
Dept: UROLOGY | Age: 70
End: 2025-02-18
Payer: MEDICARE

## 2025-02-18 LAB — BACTERIA SPEC AEROBE CULT: NO GROWTH

## 2025-03-17 ENCOUNTER — HOSPITAL ENCOUNTER (OUTPATIENT)
Dept: CT IMAGING | Facility: HOSPITAL | Age: 70
Discharge: HOME | End: 2025-03-17
Payer: MEDICARE

## 2025-03-17 ENCOUNTER — HOSPITAL ENCOUNTER (OUTPATIENT)
Facility: HOSPITAL | Age: 70
Discharge: HOME | End: 2025-03-17
Payer: MEDICARE

## 2025-03-17 DIAGNOSIS — I71.21 ANEURYSM OF ASCENDING AORTA WITHOUT RUPTURE: ICD-10-CM

## 2025-03-17 DIAGNOSIS — Q25.43 ANEURYSM OF AORTIC ROOT: ICD-10-CM

## 2025-03-17 PROCEDURE — 93306 TTE W/DOPPLER COMPLETE: CPT

## 2025-03-17 PROCEDURE — 71250 CT THORAX DX C-: CPT

## 2025-03-18 LAB
AORTIC DIMENSIONLESS INDEX: 0.76 (DI)
ASCENDING AORTA: 4 CM
AV MEAN PRESS GRAD SYS DOP V1V2: 3.4 MMHG
AV VMAX SYS DOP: 124 CM/SEC
BH CV ECHO MEAS - 2D AUTO EF SIEMENS: 53.6 %
BH CV ECHO MEAS - ACS: 1.57 CM
BH CV ECHO MEAS - AO MAX PG: 6.2 MMHG
BH CV ECHO MEAS - AO ROOT DIAM: 3.7 CM
BH CV ECHO MEAS - AO V2 VTI: 21.9 CM
BH CV ECHO MEAS - AVA(I,D): 2.9 CM2
BH CV ECHO MEAS - IVS/LVPW: 1.33 CM
BH CV ECHO MEAS - IVSD: 1.6 CM
BH CV ECHO MEAS - LA DIMENSION: 4.2 CM
BH CV ECHO MEAS - LV MAX PG: 3.2 MMHG
BH CV ECHO MEAS - LV MEAN PG: 1.6 MMHG
BH CV ECHO MEAS - LV V1 MAX: 90 CM/SEC
BH CV ECHO MEAS - LV V1 VTI: 16.6 CM
BH CV ECHO MEAS - LVIDD: 4.1 CM
BH CV ECHO MEAS - LVIDS: 3 CM
BH CV ECHO MEAS - LVOT AREA: 3.8 CM2
BH CV ECHO MEAS - LVOT DIAM: 2.2 CM
BH CV ECHO MEAS - LVPWD: 1.2 CM
BH CV ECHO MEAS - MR MAX PG: 152.5 MMHG
BH CV ECHO MEAS - MR MAX VEL: 617.4 CM/SEC
BH CV ECHO MEAS - MR MEAN PG: 102.7 MMHG
BH CV ECHO MEAS - MR VTI: 183.1 CM
BH CV ECHO MEAS - MV E MAX VEL: 92.9 CM/SEC
BH CV ECHO MEAS - MV MAX PG: 6.9 MMHG
BH CV ECHO MEAS - MV MEAN PG: 1.9 MMHG
BH CV ECHO MEAS - MV V2 VTI: 23 CM
BH CV ECHO MEAS - MVA(VTI): 2.7 CM2
BH CV ECHO MEAS - RAP SYSTOLE: 5 MMHG
BH CV ECHO MEAS - RVDD: 4.2 CM
BH CV ECHO MEAS - RVSP: 34.5 MMHG
BH CV ECHO MEAS - SV(LVOT): 62.9 ML
BH CV ECHO MEAS - TAPSE (>1.6): 1.78 CM
BH CV ECHO MEAS - TR MAX PG: 29.5 MMHG
BH CV ECHO MEAS - TR MAX VEL: 270.3 CM/SEC
IVRT: 61 MS
LEFT ATRIUM VOLUME INDEX: 43.8 ML/M2
LV EF BIPLANE MOD: 53.6 %

## 2025-07-24 RX ORDER — LISINOPRIL 40 MG/1
TABLET ORAL
OUTPATIENT
Start: 2025-07-24

## 2025-07-28 ENCOUNTER — DOCUMENTATION (OUTPATIENT)
Dept: FAMILY MEDICINE CLINIC | Facility: CLINIC | Age: 70
End: 2025-07-28
Payer: MEDICARE

## 2025-07-28 ENCOUNTER — TELEPHONE (OUTPATIENT)
Dept: FAMILY MEDICINE CLINIC | Facility: CLINIC | Age: 70
End: 2025-07-28
Payer: MEDICARE

## 2025-07-28 RX ORDER — LISINOPRIL 40 MG/1
40 TABLET ORAL DAILY
Qty: 90 TABLET | Refills: 3 | Status: SHIPPED | OUTPATIENT
Start: 2025-07-28 | End: 2025-07-28 | Stop reason: SDUPTHER

## 2025-07-28 RX ORDER — LISINOPRIL 40 MG/1
40 TABLET ORAL DAILY
Qty: 90 TABLET | Refills: 3 | Status: SHIPPED | OUTPATIENT
Start: 2025-07-28

## 2025-07-28 NOTE — TELEPHONE ENCOUNTER
Pt is needing a refill on     lisinopril (PRINIVIL,ZESTRIL) 40 MG tablet   He put this request in on Thursday 7/24/2025 but there has been nothing sent to Pharmacy.   Please call pt when this order has been placed.

## (undated) DEVICE — Device

## (undated) DEVICE — CONN JET HYDRA H20 AUXILIARY DISP

## (undated) DEVICE — LINER SURG CANSTR SXN S/RIGD 1500CC

## (undated) DEVICE — Device: Brand: DEFENDO AIR/WATER/SUCTION AND BIOPSY VALVE

## (undated) DEVICE — SOL IRR NACL 0.9PCT BT 1000ML

## (undated) DEVICE — SOLIDIFIER LIQLOC PLS 1500CC BT

## (undated) DEVICE — SOL IRRG H2O PL/BG 1000ML STRL